# Patient Record
Sex: FEMALE | Race: BLACK OR AFRICAN AMERICAN | NOT HISPANIC OR LATINO | Employment: OTHER | URBAN - METROPOLITAN AREA
[De-identification: names, ages, dates, MRNs, and addresses within clinical notes are randomized per-mention and may not be internally consistent; named-entity substitution may affect disease eponyms.]

---

## 2017-01-08 RX ORDER — LOSARTAN POTASSIUM 100 MG/1
TABLET ORAL
Qty: 30 TABLET | Refills: 5 | Status: SHIPPED | OUTPATIENT
Start: 2017-01-08 | End: 2017-07-11

## 2017-01-16 ENCOUNTER — TELEPHONE (OUTPATIENT)
Dept: FAMILY MEDICINE | Facility: CLINIC | Age: 82
End: 2017-01-16

## 2017-01-16 RX ORDER — BENZONATATE 100 MG/1
100 CAPSULE ORAL 3 TIMES DAILY PRN
Qty: 30 CAPSULE | Refills: 0 | Status: SHIPPED | OUTPATIENT
Start: 2017-01-16 | End: 2017-01-26

## 2017-01-16 NOTE — TELEPHONE ENCOUNTER
Pt c/o coughing-productive clear. wheezing no fever  Has tried OTC cough meds but not working. Would like rx for tessalon sent to pharmacy

## 2017-04-09 ENCOUNTER — HOSPITAL ENCOUNTER (EMERGENCY)
Facility: HOSPITAL | Age: 82
Discharge: HOME | End: 2017-04-09
Attending: EMERGENCY MEDICINE
Payer: MEDICARE

## 2017-04-09 VITALS
HEART RATE: 80 BPM | DIASTOLIC BLOOD PRESSURE: 72 MMHG | HEIGHT: 60 IN | WEIGHT: 147 LBS | SYSTOLIC BLOOD PRESSURE: 170 MMHG | BODY MASS INDEX: 28.86 KG/M2 | OXYGEN SATURATION: 98 % | RESPIRATION RATE: 18 BRPM | TEMPERATURE: 98 F

## 2017-04-09 DIAGNOSIS — R52 PAIN: ICD-10-CM

## 2017-04-09 DIAGNOSIS — M10.9 ACUTE GOUT INVOLVING TOE OF RIGHT FOOT, UNSPECIFIED CAUSE: Primary | ICD-10-CM

## 2017-04-09 DIAGNOSIS — I10 ESSENTIAL HYPERTENSION: ICD-10-CM

## 2017-04-09 PROCEDURE — 99283 EMERGENCY DEPT VISIT LOW MDM: CPT

## 2017-04-09 PROCEDURE — 25000003 PHARM REV CODE 250: Performed by: EMERGENCY MEDICINE

## 2017-04-09 RX ORDER — HYDROCODONE BITARTRATE AND ACETAMINOPHEN 7.5; 325 MG/1; MG/1
1 TABLET ORAL EVERY 6 HOURS PRN
Qty: 11 TABLET | Refills: 0 | Status: SHIPPED | OUTPATIENT
Start: 2017-04-09 | End: 2017-04-26 | Stop reason: SDUPTHER

## 2017-04-09 RX ORDER — METHYLPREDNISOLONE 4 MG/1
TABLET ORAL
Qty: 1 PACKAGE | Refills: 0 | Status: SHIPPED | OUTPATIENT
Start: 2017-04-09 | End: 2017-04-10

## 2017-04-09 RX ORDER — HYDROCODONE BITARTRATE AND ACETAMINOPHEN 10; 325 MG/1; MG/1
1 TABLET ORAL
Status: COMPLETED | OUTPATIENT
Start: 2017-04-09 | End: 2017-04-09

## 2017-04-09 RX ORDER — COLCHICINE 0.6 MG/1
TABLET ORAL
Qty: 30 TABLET | Refills: 0 | Status: CANCELLED | OUTPATIENT
Start: 2017-04-09

## 2017-04-09 RX ADMIN — HYDROCODONE BITARTRATE AND ACETAMINOPHEN 1 TABLET: 10; 325 TABLET ORAL at 04:04

## 2017-04-09 NOTE — ED AVS SNAPSHOT
[facility] -   [address] [facility]  [place] [address]               Charline Messer   2017  3:39 PM   ED    Description:  Female : 1935   Department:  [facility]           Your Care was Coordinated By:     Provider Role From To    Kenisha Raymundo DO Attending Provider 17 1555 --      Reason for Visit     Foot Pain           Diagnoses this Visit        Comments    Acute gout involving toe of right foot, unspecified cause    -  Primary     Pain         Essential hypertension           ED Disposition     None           To Do List           Follow-up Information     Follow up with Za Rey MD. Schedule an appointment as soon as possible for a visit in 2 days.    Specialty:  Family Medicine    Why:  Return to the ED for:  Worsening pain, swelling, fever, or other concerns.    Contact information:    [address]  [place] [address]  [phone]         These Medications        Disp Refills Start End    methylPREDNISolone (MEDROL DOSEPACK) 4 mg tablet 1 Package 0 2017    As directed    Pharmacy: [facility] [address] [place] [address] [address] [facility] Ph #: [phone]       hydrocodone-acetaminophen 7.5-325mg (NORCO) 7.5-325 mg per tablet 11 tablet 0 2017     Take 1 tablet by mouth every 6 (six) hours as needed for Pain. - Oral    Pharmacy: [address] [facility] Ph #: [phone]         [facility]     [facility] Care [facility] -  Assistance  Unless otherwise directed by your provider, please contact [facility], our nurse care line that is available for / assistance.     Registered nurses in the [facility] provide: appointment scheduling, clinical advisement, health education, and other advisory services.  Call: [phone] (toll free)               Medications           Message regarding Medications     Verify the changes  and/or additions to your medication regime listed below are the same as discussed with your clinician today.  If any of these changes or additions are incorrect, please notify your healthcare provider.        START taking these NEW medications        Refills    methylPREDNISolone (MEDROL DOSEPACK) 4 mg tablet 0    Sig: As directed    Class: Print    hydrocodone-acetaminophen 7.5-325mg (NORCO) 7.5-325 mg per tablet 0    Sig: Take 1 tablet by mouth every 6 (six) hours as needed for Pain.    Class: Print    Route: Oral      These medications were administered today        Dose Freq    hydrocodone-acetaminophen 10-325mg per tablet 1 tablet 1 tablet ED 1 Time    Sig: Take 1 tablet by mouth ED 1 Time.    Class: Normal    Route: Oral           Verify that the below list of medications is an accurate representation of the medications you are currently taking.  If none reported, the list may be blank. If incorrect, please contact your healthcare provider. Carry this list with you in case of emergency.           Current Medications     aspirin (ECOTRIN) 81 MG EC tablet Take 1 tablet by mouth Daily.    colchicine 0.6 mg tablet Take 1 pill daily prn    fluticasone (FLONASE) 50 mcg/actuation nasal spray 2 sprays by Each Nare route daily as needed for Rhinitis.    furosemide (LASIX) 20 MG tablet Take 1 tablet by mouth Daily. prn    hydrocodone-acetaminophen 10-325mg per tablet 1 tablet Take 1 tablet by mouth ED 1 Time.    hydrocodone-acetaminophen 7.5-325mg (NORCO) 7.5-325 mg per tablet Take 1 tablet by mouth every 6 (six) hours as needed for Pain.    losartan (COZAAR) 100 MG tablet TAKE ONE TABLET BY MOUTH ONCE DAILY    methylPREDNISolone (MEDROL DOSEPACK) 4 mg tablet As directed           Clinical Reference Information           Your Vitals Were     BP Pulse Temp Resp Height Weight    142/106 91 97.5 °F (36.4 °C) 20 5' (1.524 m) 66.7 kg (147 lb)    SpO2 BMI             98% 28.71 kg/m2         Allergies as of 4/9/2017         Reactions    Codeine     Other reaction(s): Unknown    Ibuprofen     Other reaction(s): Vomiting  Other reaction(s): Itching    Zetia  [Ezetimibe]     Other reaction(s): Vomiting      Immunizations Administered on Date of Encounter - 4/9/2017     None      ED Micro, Lab, POCT     None      ED Imaging Orders     Start Ordered       Status Ordering Provider    04/09/17 1556 04/09/17 1556  X-Ray Foot Complete Right  1 time imaging      Final result       Discharge References/Attachments     GOUT ATTACKS, TREATING (ENGLISH)    METHYLPREDNISOLONE TABLETS (ENGLISH)    ACETAMINOPHEN; HYDROCODONE TABLETS OR CAPSULES (ENGLISH)      Your Scheduled Appointments     Apr 12, 2017 11:15 AM CDT   Established Patient Visit with Za Rey MD   [facility] ([facility])    [address] [place] [address] [phone]              [facility] Sign-Up     Activating your [facility] account is as easy as 1-2-3!     1) Visit [email or web address], select Sign Up Now, enter this activation code and your date of birth, then select Next.  [email or web address]  Expires: 5/24/2017  4:50 PM      2) Create a username and password to use when you visit [facility] in the future and select a security question in case you lose your password and select Next.    3) Enter your e-mail address and click Sign Up!    Additional Information  If you have questions, please e-mail [email or web address] or call [phone] to talk to our [facility] staff. Remember, [facility] is NOT to be used for urgent needs. For medical emergencies, dial 911.          [facility] complies with applicable Federal civil rights laws and does not discriminate on the basis of race, color, national origin, age, disability, or sex.        Language Assistance Services     ATTENTION: Language assistance services are available, free of charge. Please call [phone].      ATENCIÓN: susan Coy  disposición servicios gratuitos de asistencia lingüística. Llzelda al [phone].     SUDHIR Ý: N?u b?n nói Ti?ng Vi?t, có các d?ch v? h? tr? ngôn ng? mi?n phí dành cho b?n. G?i s? [phone].

## 2017-04-09 NOTE — ED PROVIDER NOTES
SCRIBE #1 NOTE: I, Hortencia Wade, am scribing for, and in the presence of, Kenisha Raymundo DO. I have scribed the entire note.      History      Chief Complaint   Patient presents with    Foot Pain     R foot/edema.  pt c/o gout also states she dropped hoe handle on it this am       Review of patient's allergies indicates:   Allergen Reactions    Codeine      Other reaction(s): Unknown    Ibuprofen      Other reaction(s): Vomiting  Other reaction(s): Itching    Zetia  [ezetimibe]      Other reaction(s): Vomiting        HPI   HPI    4/9/2017, 3:56 PM   History obtained from the patient      History of Present Illness: Charline Messer is a 82 y.o. female patient who presents to the Emergency Department for exacerbation of R toe pain. She reports that she has a PMHx of gout. Pt states that she has had toe pain since 3 days ago but pain was exacerbated 2 days ago after a shovel hit her foot. She experienced similar pain 5-6 months ago. Symptoms are constant and moderate in severity. Pain exacerbated with palpation. Associated sxs include 1 episode of N/V this morning. Patient denies fever, chills, ecchymosis, extremity weakness/numbness, paresthesias, calf pain, and all other sxs at this time. She reports that Lortab has provided some relief from pain in the past. No further complaints or concerns at this time.       Arrival mode: Personal vehicle    PCP: Za Rey MD       Past Medical History:  Past Medical History:   Diagnosis Date    Allergic rhinitis, cause unspecified     Arthritis     Asthma     as a child    Benign colonic polyp     Breast cancer mid 1980s    right; per patient s/p right mastectomy and chemo, unsure about radiation    Depression     Diverticular disease     External hemorrhoids     Gout attack     Hyperlipidemia     Hypertension     Hypothyroidism     Obesity (BMI 30-39.9) 10/24/2016    Osteoarthritis     Peripheral vascular disease     Postmenopausal     Pulmonary  embolism     reported per patient; unsure date    Thrombocytopenia     Tinnitus aurium     Vitamin D deficiency disease        Past Surgical History:  Past Surgical History:   Procedure Laterality Date    PARTIAL HYSTERECTOMY      Due to fibroids    Right mastectomy           Family History:  Family History   Problem Relation Age of Onset    Heart disease Mother     Hypertension Mother     Stroke Mother     Hypertension Father     Heart disease Sister     No Known Problems Son     Cancer Neg Hx     Diabetes Neg Hx     Kidney disease Neg Hx        Social History:  Social History     Social History Main Topics    Smoking status: Never Smoker    Smokeless tobacco: Never Used    Alcohol use 2.4 oz/week     4 Cans of beer, 0 Standard drinks or equivalent per week    Drug use: No    Sexual activity: No       ROS   Review of Systems   Constitutional: Negative for chills and fever.   HENT: Negative for sore throat.    Respiratory: Negative for shortness of breath.    Cardiovascular: Negative for chest pain.   Gastrointestinal: Positive for nausea and vomiting.   Genitourinary: Negative for dysuria.   Musculoskeletal: Positive for arthralgias (R toe). Negative for back pain.   Skin: Negative for color change (ecchymosis) and rash.   Neurological: Negative for weakness and numbness.        (-) paresthesias   Hematological: Does not bruise/bleed easily.   All other systems reviewed and are negative.      Physical Exam    Initial Vitals   BP Pulse Resp Temp SpO2   04/09/17 1453 04/09/17 1453 04/09/17 1453 04/09/17 1453 04/09/17 1453   142/106 91 20 97.5 °F (36.4 °C) 98 %      Physical Exam  Nursing Notes and Vital Signs Reviewed.  Constitutional: Patient is in no acute distress. Awake and alert. Well-developed and well-nourished.  Head: Atraumatic. Normocephalic.  Eyes: PERRL. EOM intact. Conjunctivae are not pale. No scleral icterus.  ENT: Mucous membranes are moist. Oropharynx is clear and symmetric.     Neck: Supple. Full ROM.   Cardiovascular: Regular rate. Regular rhythm. No murmurs, rubs, or gallops. Distal pulses are 2+ and symmetric.  Pulmonary/Chest: No respiratory distress. Clear to auscultation bilaterally. No wheezing, rales, or rhonchi.  Abdominal: Soft. Non distended.   Musculoskeletal: Moves all extremities. No obvious deformities.  No calf tenderness.  Right foot: No evident deformity. Warmth, swelling, and erythema to first MTP joint. No midfoot involvement. Cap refill distally is <2 seconds. DP and PT pulses are equal and 2+ bilaterally. No motor deficit. No distal sensory deficit  Skin: Warm and dry.  Neurological:  Alert, awake, and appropriate.  Normal speech.  No acute focal neurological deficits are appreciated.  Psychiatric: Normal affect. Good eye contact. Appropriate in content.    ED Course    Procedures  ED Vital Signs:  Vitals:    04/09/17 1453 04/09/17 1707   BP: (!) 142/106 (!) 170/72   Pulse: 91 80   Resp: 20 18   Temp: 97.5 °F (36.4 °C)    SpO2: 98% 98%   Weight: 66.7 kg (147 lb)    Height: 5' (1.524 m)      Imaging Results:  Imaging Results         X-Ray Foot Complete Right (Final result) Result time:  04/09/17 16:26:36    Final result by Arsenio Morrell MD (04/09/17 16:26:36)    Impression:         Unremarkable exam.      Electronically signed by: ARSENIO MORRELL MD  Date:     04/09/17  Time:    16:26     Narrative:    Exam: Right foot x-ray, 3 views.    History: Pain in right foot.    Findings: Anatomic alignment. No fracture or periosteal reaction identified.            The Emergency Provider reviewed the vital signs and test results, which are outlined above.    ED Discussion     4:38 PM: Reassessed pt at this time. Discussed pt dx and plan to tx with Lortab. Informed pt to follow up with PCP. D/w patient that she is at increased risk of falling when on Lortab. D/w patient to watch for spreading of erythema, blister formation, or development of fever and to return to ED if she  does experience these sxs. All questions and concerns were addressed at this time. Pt expresses understanding of information and instructions, and is comfortable with plan to discharge. Pt is stable for discharge.    I discussed with patient and/or family/caretaker that negative X-ray does not rule out occult fracture or other soft tissue injury.  Persistent pain greater than 7-10 days or increased pain requires follow up, specifically with orthopedics.     Driving or other activities under influence of medications - Patient and/or family/caretaker was given a prescription for, or instructed to use a medicine that may impair ability to drive, operate machinery, or participate in other potentially dangerous activities.  Patient was instructed not to participate in these activities while under the influence of these medications.    Pre-hypertension/Hypertension: The pt has been informed that they may have pre-hypertension or hypertension based on a blood pressure reading in the ED. I recommend that the pt call the PCP listed on their discharge instructions or a physician of their choice this week to arrange f/u for further evaluation of possible pre-hypertension or hypertension.     ED Medication(s):  Medications   hydrocodone-acetaminophen 10-325mg per tablet 1 tablet (1 tablet Oral Given 4/9/17 1654)       Discharge Medication List as of 4/9/2017  4:51 PM      START taking these medications    Details   hydrocodone-acetaminophen 7.5-325mg (NORCO) 7.5-325 mg per tablet Take 1 tablet by mouth every 6 (six) hours as needed for Pain., Starting 4/9/2017, Until Discontinued, Print      methylPREDNISolone (MEDROL DOSEPACK) 4 mg tablet As directed, Print             Follow-up Information     Follow up with Za Rey MD. Schedule an appointment as soon as possible for a visit in 2 days.    Specialty:  Family Medicine    Why:  Return to the ED for:  Worsening pain, swelling, fever, or other concerns.    Contact  information:    [address] [address]  [phone]             Medical Decision Making    Medical Decision Making:   Clinical Tests:   Radiological Study: Ordered and Reviewed           Scribe Attestation:   Scribe #1: I performed the above scribed service and the documentation accurately describes the services I performed. I attest to the accuracy of the note.    Attending:   Physician Attestation Statement for Scribe #1: I, Kenisha Raymundo DO, personally performed the services described in this documentation, as scribed by Hortencia Wade, in my presence, and it is both accurate and complete.          Clinical Impression       ICD-10-CM ICD-9-CM   1. Acute gout involving toe of right foot, unspecified cause M10.9 274.01   2. Pain R52 780.96   3. Essential hypertension I10 401.9       Disposition:   Disposition: Discharged  Condition: Stable         Kenisha Raymundo DO  04/09/17 2114

## 2017-04-10 ENCOUNTER — OFFICE VISIT (OUTPATIENT)
Dept: FAMILY MEDICINE | Facility: CLINIC | Age: 82
End: 2017-04-10
Payer: MEDICARE

## 2017-04-10 VITALS
HEIGHT: 60 IN | TEMPERATURE: 97 F | RESPIRATION RATE: 18 BRPM | WEIGHT: 148.56 LBS | DIASTOLIC BLOOD PRESSURE: 78 MMHG | BODY MASS INDEX: 29.17 KG/M2 | SYSTOLIC BLOOD PRESSURE: 122 MMHG | HEART RATE: 80 BPM

## 2017-04-10 DIAGNOSIS — D69.6 THROMBOCYTOPENIA: ICD-10-CM

## 2017-04-10 DIAGNOSIS — C50.911 MALIGNANT NEOPLASM OF RIGHT FEMALE BREAST, UNSPECIFIED SITE OF BREAST: ICD-10-CM

## 2017-04-10 DIAGNOSIS — M10.9 GOUT, ARTHRITIS: Primary | ICD-10-CM

## 2017-04-10 DIAGNOSIS — I73.9 PAD (PERIPHERAL ARTERY DISEASE): ICD-10-CM

## 2017-04-10 PROCEDURE — 99214 OFFICE O/P EST MOD 30 MIN: CPT | Mod: S$GLB,,, | Performed by: FAMILY MEDICINE

## 2017-04-10 PROCEDURE — 99999 PR PBB SHADOW E&M-EST. PATIENT-LVL III: CPT | Mod: PBBFAC,,, | Performed by: FAMILY MEDICINE

## 2017-04-10 PROCEDURE — 1125F AMNT PAIN NOTED PAIN PRSNT: CPT | Mod: S$GLB,,, | Performed by: FAMILY MEDICINE

## 2017-04-10 PROCEDURE — 1157F ADVNC CARE PLAN IN RCRD: CPT | Mod: S$GLB,,, | Performed by: FAMILY MEDICINE

## 2017-04-10 PROCEDURE — 99499 UNLISTED E&M SERVICE: CPT | Mod: S$GLB,,, | Performed by: FAMILY MEDICINE

## 2017-04-10 PROCEDURE — 1160F RVW MEDS BY RX/DR IN RCRD: CPT | Mod: S$GLB,,, | Performed by: FAMILY MEDICINE

## 2017-04-10 PROCEDURE — 3078F DIAST BP <80 MM HG: CPT | Mod: S$GLB,,, | Performed by: FAMILY MEDICINE

## 2017-04-10 PROCEDURE — 3074F SYST BP LT 130 MM HG: CPT | Mod: S$GLB,,, | Performed by: FAMILY MEDICINE

## 2017-04-10 PROCEDURE — 1159F MED LIST DOCD IN RCRD: CPT | Mod: S$GLB,,, | Performed by: FAMILY MEDICINE

## 2017-04-10 RX ORDER — COLCHICINE 0.6 MG/1
TABLET ORAL
Qty: 30 TABLET | Refills: 1 | Status: SHIPPED | OUTPATIENT
Start: 2017-04-10 | End: 2018-02-05 | Stop reason: SDUPTHER

## 2017-04-10 NOTE — MR AVS SNAPSHOT
[facility]  [address] [facility] [address]  Phone: [phone]                  Charline Messer   4/10/2017 1:00 PM   Office Visit    Description:  Female : 1935   Provider:  Za Rey MD   Department:  [facility]           Reason for Visit     Foot Swelling           Diagnoses this Visit        Comments    Gout, arthritis    -  Primary     PAD (peripheral artery disease)         Thrombocytopenia                To Do List           Future Appointments        Provider Department Dept Phone    2017 11:15 AM Za Rey MD [facility] [phone]      Goals (5 Years of Data)     None       These Medications        Disp Refills Start End    colchicine 0.6 mg tablet 30 tablet 1 4/10/2017     Take 1 pill daily prn    Pharmacy: [address] #: [phone]         [facility]     [facility] Call Nurse Care Line -  Assistance  Unless otherwise directed by your provider, please contact [facility], our nurse care line that is available for  assistance.     Registered nurses in the [facility] provide: appointment scheduling, clinical advisement, health education, and other advisory services.  Call: [phone] (toll free)               Medications           Message regarding Medications     Verify the changes and/or additions to your medication regime listed below are the same as discussed with your clinician today.  If any of these changes or additions are incorrect, please notify your healthcare provider.        STOP taking these medications     methylPREDNISolone (MEDROL DOSEPACK) 4 mg tablet As directed           Verify that the below list of medications is an accurate representation of the medications you are currently taking.  If none reported, the list may be blank. If incorrect, please contact your healthcare  provider. Carry this list with you in case of emergency.           Current Medications     aspirin (ECOTRIN) 81 MG EC tablet Take 1 tablet by mouth Daily.    colchicine 0.6 mg tablet Take 1 pill daily prn    fluticasone (FLONASE) 50 mcg/actuation nasal spray 2 sprays by Each Nare route daily as needed for Rhinitis.    furosemide (LASIX) 20 MG tablet Take 1 tablet by mouth Daily. prn    hydrocodone-acetaminophen 7.5-325mg (NORCO) 7.5-325 mg per tablet Take 1 tablet by mouth every 6 (six) hours as needed for Pain.    losartan (COZAAR) 100 MG tablet TAKE ONE TABLET BY MOUTH ONCE DAILY           Clinical Reference Information           Your Vitals Were     BP Pulse Temp Resp Height Weight    122/78 (BP Location: Left arm, Patient Position: Sitting, BP Method: Manual) 80 96.8 °F (36 °C) (Tympanic) 18 5' (1.524 m) 67.4 kg (148 lb 9.4 oz)    BMI                29.02 kg/m2          Blood Pressure          Most Recent Value    BP  122/78      Allergies as of 4/10/2017     Codeine    Ibuprofen    Zetia  [Ezetimibe]      Immunizations Administered on Date of Encounter - 4/10/2017     None      [facility] Sign-Up     Activating your [facility] account is as easy as 1-2-3!     1) Visit [email or web address], select Sign Up Now, enter this activation code and your date of birth, then select Next.  [email or web address]  Expires: 5/24/2017  4:50 PM      2) Create a username and password to use when you visit [facility] in the future and select a security question in case you lose your password and select Next.    3) Enter your e-mail address and click Sign Up!    Additional Information  If you have questions, please e-mail [email or web address] or call [phone] to talk to our [facility] staff. Remember, [facility] is NOT to be used for urgent needs. For medical emergencies, dial 911.         Language Assistance Services     ATTENTION: Language assistance services are available, free of charge. Please call [phone].      ATENCIÓN: Si  habla luis, tiene a franco disposición servicios gratuitos de asistencia lingüística. Llzelda al [phone].     SUDHIR Ý: N?u b?n nói Ti?ng Vi?t, có các d?ch v? h? tr? ngôn ng? mi?n phí dành cho b?n. G?i s? [phone].         [facility] complies with applicable Federal civil rights laws and does not discriminate on the basis of race, color, national origin, age, disability, or sex.

## 2017-04-10 NOTE — LETTER
April 10, 2017      [facility]  [address] [facility] [address]  Phone: [phone]       Patient: Charline Messer   YOB: 1935  Date of Visit: 04/10/2017    To Whom It May Concern:    Charline  was at [facility] on 04/6/2017. She may return to work on 04/10/2017 with no restrictions. If you have any questions or concerns, or if I can be of further assistance, please do not hesitate to contact me.    Sincerely,    Za Rey MD

## 2017-04-10 NOTE — PROGRESS NOTES
Subjective:       Patient ID: Charline Messer is a 82 y.o. female.    Chief Complaint: Foot Swelling    HPI Comments: Ms. Messer comes in today for ER follow-up for foot swelling and pain.  She was evaluated at [facility] on April 19, 2017 for acute gout involving her right foot.  She states she dropped hoe handle onto her foot yesterday morning with subsequent pain in her right foot with walking.  However, she reports having pain and swelling at her right foot since Thursday.  She reports having subjective fever, chills without chest pain, palpitations, leg swelling, shortness of breath, cough, wheezing, abdominal pain, nausea, vomiting, diarrhea, constipation.  Right foot x-ray was performed and unremarkable.  She was given prescriptions for Medrol Dosepak, Norco 7.5-325 mg every 6 hours prn pain, #11 and told to see me in 2 days for follow-up.      She reports slight soreness at her foot today at 4/10 on pain scale.  She is ambulatory.  She consumes alcohol.  She states she took colchicine on Friday but is currently out of colchicine after starting it on Thursday.  She reports swelling improved last night.        Current Outpatient Prescriptions:  aspirin (ECOTRIN) 81 MG EC tablet, Take 1 tablet by mouth Daily.  colchicine 0.6 mg tablet, Take 1 pill daily prn  fluticasone (FLONASE) 50 mcg/actuation nasal spray, 2 sprays by Each Nare route daily as needed for Rhinitis.  furosemide (LASIX) 20 MG tablet, Take 1 tablet by mouth Daily. prn  hydrocodone-acetaminophen 7.5-325mg (NORCO) 7.5-325 mg per tablet, Take 1 tablet by mouth every 6 (six) hours as needed for Pain.  losartan (COZAAR) 100 MG tablet, TAKE ONE TABLET BY MOUTH ONCE DAILY    Labs:                   WBC                      8.17                10/24/2016                 HGB                      13.2                10/24/2016                 HCT                      41.7                10/24/2016                 PLT                      137 (L)              10/24/2016              LDLCALC                  149.2               01/19/2016                           CHOL                     239 (H)             01/19/2016                 TRIG                     89                  01/19/2016                 HDL                      72                  01/19/2016                 ALT                      16                  01/19/2016                 AST                      18                  01/19/2016                 NA                       141                 01/19/2016                 K                        3.7                 01/19/2016                 CL                       104                 01/19/2016                 CREATININE               0.9                 01/19/2016                 BUN                      25 (H)              01/19/2016                 CO2                      29                  01/19/2016                 TSH                      3.784               01/19/2016                 INR                      1.0                 09/11/2014                  Review of Systems   Constitutional: Positive for chills and fever.   Respiratory: Negative for cough, shortness of breath and wheezing.    Cardiovascular: Negative for chest pain.   Gastrointestinal: Negative for abdominal pain, constipation, diarrhea, nausea and vomiting.   Musculoskeletal: Positive for joint swelling.        Positive for right foot pain.       Objective:      Physical Exam   Constitutional: She is oriented to person, place, and time. She appears well-developed and well-nourished. No distress.   Elderly and pleasant.   Eyes:   She wears glasses.   Cardiovascular: Normal rate and regular rhythm.    No murmur heard.  Chronic, slightly decreased pedal pulse at right.   Pulmonary/Chest: Effort normal and breath sounds normal. No respiratory distress. She has no wheezes.   Abdominal: Soft. Bowel sounds are normal. She exhibits no distension and no mass. There is no tenderness.  There is no rebound and no guarding.   Musculoskeletal: Normal range of motion. She exhibits edema and tenderness.   She is ambulatory without problems. Slightly tender with slight swelling at right lateral great toe with full range of motion noted.   Neurological: She is alert and oriented to person, place, and time. She has normal reflexes.   Skin: She is not diaphoretic.   Psychiatric: She has a normal mood and affect. Her behavior is normal. Judgment and thought content normal.   Vitals reviewed.      Assessment:       1. Gout, arthritis    2. PAD (peripheral artery disease)    3. Thrombocytopenia    4. Malignant neoplasm of right female breast, unspecified site of breast        Plan:       1.  No labs today.  2.  Continue current medications, follow low sodium, low cholesterol, low carb diet, daily walks.  3.  Prescription refill - Colchicine 0.6 mg daily prn, #30, 1 refill.  4.  Advised patient to avoid alcohol.  5.  Follow up sooner if no improvement or worsening symptoms noted.  6.  Keep scheduled 4/12/2017 physical w/me with fasting annual labs.  7.  Work excuse for 4/6/2017 - 4/7/2017 with return 4/10/2017.

## 2017-04-26 ENCOUNTER — HOSPITAL ENCOUNTER (EMERGENCY)
Facility: HOSPITAL | Age: 82
Discharge: HOME | End: 2017-04-26
Attending: EMERGENCY MEDICINE
Payer: MEDICARE

## 2017-04-26 VITALS
WEIGHT: 140 LBS | HEIGHT: 60 IN | DIASTOLIC BLOOD PRESSURE: 66 MMHG | HEART RATE: 98 BPM | OXYGEN SATURATION: 97 % | TEMPERATURE: 98 F | RESPIRATION RATE: 18 BRPM | SYSTOLIC BLOOD PRESSURE: 172 MMHG | BODY MASS INDEX: 27.48 KG/M2

## 2017-04-26 DIAGNOSIS — T14.8XXA CONTUSION: ICD-10-CM

## 2017-04-26 DIAGNOSIS — M10.9 GOUTY ARTHRITIS OF TOE: Primary | ICD-10-CM

## 2017-04-26 PROCEDURE — 99283 EMERGENCY DEPT VISIT LOW MDM: CPT

## 2017-04-26 RX ORDER — HYDROCODONE BITARTRATE AND ACETAMINOPHEN 5; 325 MG/1; MG/1
1 TABLET ORAL EVERY 4 HOURS PRN
Qty: 10 TABLET | Refills: 0 | Status: SHIPPED | OUTPATIENT
Start: 2017-04-26 | End: 2017-07-11 | Stop reason: SDUPTHER

## 2017-04-26 NOTE — DISCHARGE INSTRUCTIONS
Gout    Gout is an inflammation of a joint due to a build-up of gout crystals in the joint fluid. This occurs when there is an excess of uric acid (a normal waste product) in the body. Uric acid builds up in the body when the kidneys are unable to filter enough of it from the blood. This may occur with age. It is also associated with kidney disease. Gout occurs more often in persons with obesity, diabetes, hypertension, or high levels of fats in the blood. It may be run in families. Gout tends to come and go. A flare up of gout is called an attack. Drinking alcohol or eating certain foods (such as shellfish or foods with additives such as high-fructose corn syrup) may increase uric acid levels in the blood and cause a gout attack.  During a gout attack, the affected joint may become a hot, red, swollen and painful. If you have had one attack of gout, you are likely to have another. An attack of gout can be treated with medicine. If these attacks become frequent, a daily medicine may be prescribed to help the kidneys remove uric acid from the body.  Home care  During a gout attack:  · Rest painful joints. If gout affects the joints of your foot or leg, you may want to use crutches for the first few days to keep from bearing weight on the affected joint.  · When sitting or lying down, raise the painful joint to a level higher than your heart.  · Apply an ice pack (ice cubes in a plastic bag wrapped in a thin towel) over the injured area for 20 minutes every 1-2 hours the first day for pain relief. Continue this 3-4 times a day for swelling and pain.  · Avoid alcohol and foods listed below (see Preventing attacks) during a gout attack. Drink extra fluid to help flush the uric acid through your kidneys.  · If you were prescribed a medication to treat gout, take it as your healthcare provider has instructed. Don't skip doses.  · Take anti-inflammatory medicine as directed.   · If pain medicines have been prescribed,  take them exactly as directed.    Preventing attacks  · Minimize or avoid alcohol use. Excess alcohol intake can cause a gout attack.  · Limit these foods and beverages:  ¨ Organ meats, such as kidneys and liver  ¨ Certain seafoods (anchovies, sardines, shrimp, scallops, herring, mackerel)  ¨ Wild game, meat extracts and meat gravies  ¨ Foods and beverages sweetened with high-fructose corn syrup, such as sodas  · Eat a healthy diet including low-fat and nonfat dairy, whole grains, and vegetables.  · If you are overweight, talk to your healthcare provider about a weight reduction plan. Avoid fasting or extreme low calorie diets (less than 900 calories per day). This will increase uric acid levels in the body.  · If you have diabetes or high blood pressure, work with your doctor to manage these conditions.  · Protect the joint from injury. Trauma can trigger a gout attack.  Follow-up care  Follow up with your healthcare provider or as advised.   When to seek medical advice  Call your healthcare provider if you have any of the following:  · Fever over 100.4°F (38.ºC) with worsening joint pain  · Increasing redness around the joint  · Pain developing in another joint  · Repeated vomiting, abdominal pain, or blood in the vomit or stool (black or red color)  Date Last Reviewed: 5/14/2015  © 4945-0762 [employer]. [address] [place] [address], [place] [address]. All rights reserved. This information is not intended as a substitute for professional medical care. Always follow your healthcare professional's instructions.

## 2017-04-26 NOTE — ED AVS SNAPSHOT
[facility] -   [address] [place] [id number]               Charline Messer   2017  6:52 AM   ED    Description:  Female : 1935   Department:  [facility]           Your Care was Coordinated By:     Provider Role From To    Vijay Marinelli MD Attending Provider 17 0654 --      Reason for Visit     Foot Pain           Diagnoses this Visit        Comments    Gouty arthritis of toe    -  Primary     Contusion           ED Disposition     None           To Do List           Follow-up Information     Follow up with Za Rey MD. Call in 1 day.    Specialty:  Family Medicine    Contact information:    [address]  [facility] [address]  [phone]          Follow up with [facility].    Specialty:  Emergency Medicine    Why:  If symptoms worsen    Contact information:    [address] [place] [id number] [phone]       These Medications        Disp Refills Start End    hydrocodone-acetaminophen 5-325mg (NORCO) 5-325 mg per tablet 10 tablet 0 2017     Take 1 tablet by mouth every 4 (four) hours as needed. - Oral    Pharmacy: [facility] [address] [place] [address] [address] [place] [address] #: [phone]         [facility]     [facility] Care Line - 24/7 Assistance  Unless otherwise directed by your provider, please contact [facility], our nurse care line that is available for 24/7 assistance.     Registered nurses in the [facility] provide: appointment scheduling, clinical advisement, health education, and other advisory services.  Call: [phone] (toll free)               Medications           Message regarding Medications     Verify the changes and/or additions to your medication regime listed below are the same as discussed with your clinician today.  If any of these changes or additions are incorrect, please notify your  healthcare provider.        START taking these NEW medications        Refills    hydrocodone-acetaminophen 5-325mg (NORCO) 5-325 mg per tablet 0    Sig: Take 1 tablet by mouth every 4 (four) hours as needed.    Class: Print    Route: Oral      STOP taking these medications     hydrocodone-acetaminophen 7.5-325mg (NORCO) 7.5-325 mg per tablet Take 1 tablet by mouth every 6 (six) hours as needed for Pain.           Verify that the below list of medications is an accurate representation of the medications you are currently taking.  If none reported, the list may be blank. If incorrect, please contact your healthcare provider. Carry this list with you in case of emergency.           Current Medications     aspirin (ECOTRIN) 81 MG EC tablet Take 1 tablet by mouth Daily.    colchicine 0.6 mg tablet Take 1 pill daily prn    fluticasone (FLONASE) 50 mcg/actuation nasal spray 2 sprays by Each Nare route daily as needed for Rhinitis.    furosemide (LASIX) 20 MG tablet Take 1 tablet by mouth Daily. prn    losartan (COZAAR) 100 MG tablet TAKE ONE TABLET BY MOUTH ONCE DAILY    hydrocodone-acetaminophen 5-325mg (NORCO) 5-325 mg per tablet Take 1 tablet by mouth every 4 (four) hours as needed.    lidocaine-EPINEPHrine 1%-1:100,000 injection 5 mL Inject 5 mLs into the skin one time.           Clinical Reference Information           Your Vitals Were     BP Pulse Temp Resp Height Weight    172/66 (BP Location: Right arm, Patient Position: Sitting) 98 97.5 °F (36.4 °C) (Oral) 18 5' (1.524 m) 63.5 kg (140 lb)    SpO2 BMI             97% 27.34 kg/m2         Allergies as of 4/26/2017        Reactions    Codeine     Other reaction(s): Unknown    Ibuprofen     Other reaction(s): Vomiting  Other reaction(s): Itching    Zetia  [Ezetimibe]     Other reaction(s): Vomiting      Immunizations Administered on Date of Encounter - 4/26/2017     None      ED Micro, Lab, POCT     None      ED Imaging Orders     Start Ordered       Status Ordering  Provider    04/26/17 0705 04/26/17 0705  X-Ray Foot Complete Right  1 time imaging      Final result         Discharge Instructions         Gout    Gout is an inflammation of a joint due to a build-up of gout crystals in the joint fluid. This occurs when there is an excess of uric acid (a normal waste product) in the body. Uric acid builds up in the body when the kidneys are unable to filter enough of it from the blood. This may occur with age. It is also associated with kidney disease. Gout occurs more often in persons with obesity, diabetes, hypertension, or high levels of fats in the blood. It may be run in families. Gout tends to come and go. A flare up of gout is called an attack. Drinking alcohol or eating certain foods (such as shellfish or foods with additives such as high-fructose corn syrup) may increase uric acid levels in the blood and cause a gout attack.  During a gout attack, the affected joint may become a hot, red, swollen and painful. If you have had one attack of gout, you are likely to have another. An attack of gout can be treated with medicine. If these attacks become frequent, a daily medicine may be prescribed to help the kidneys remove uric acid from the body.  Home care  During a gout attack:  · Rest painful joints. If gout affects the joints of your foot or leg, you may want to use crutches for the first few days to keep from bearing weight on the affected joint.  · When sitting or lying down, raise the painful joint to a level higher than your heart.  · Apply an ice pack (ice cubes in a plastic bag wrapped in a thin towel) over the injured area for 20 minutes every 1-2 hours the first day for pain relief. Continue this 3-4 times a day for swelling and pain.  · Avoid alcohol and foods listed below (see Preventing attacks) during a gout attack. Drink extra fluid to help flush the uric acid through your kidneys.  · If you were prescribed a medication to treat gout, take it as your healthcare  provider has instructed. Don't skip doses.  · Take anti-inflammatory medicine as directed.   · If pain medicines have been prescribed, take them exactly as directed.    Preventing attacks  · Minimize or avoid alcohol use. Excess alcohol intake can cause a gout attack.  · Limit these foods and beverages:  ¨ Organ meats, such as kidneys and liver  ¨ Certain seafoods (anchovies, sardines, shrimp, scallops, herring, mackerel)  ¨ Wild game, meat extracts and meat gravies  ¨ Foods and beverages sweetened with high-fructose corn syrup, such as sodas  · Eat a healthy diet including low-fat and nonfat dairy, whole grains, and vegetables.  · If you are overweight, talk to your healthcare provider about a weight reduction plan. Avoid fasting or extreme low calorie diets (less than 900 calories per day). This will increase uric acid levels in the body.  · If you have diabetes or high blood pressure, work with your doctor to manage these conditions.  · Protect the joint from injury. Trauma can trigger a gout attack.  Follow-up care  Follow up with your healthcare provider or as advised.   When to seek medical advice  Call your healthcare provider if you have any of the following:  · Fever over 100.4°F (38.ºC) with worsening joint pain  · Increasing redness around the joint  · Pain developing in another joint  · Repeated vomiting, abdominal pain, or blood in the vomit or stool (black or red color)  Date Last Reviewed: 5/14/2015  © 7585-3318 [employer]. [address] [place] [address]. All rights reserved. This information is not intended as a substitute for professional medical care. Always follow your healthcare professional's instructions.          Your Scheduled Appointments     May 31, 2017 11:30 AM CDT   Established Patient Visit with Za Rey MD   [facility]    [address] [facility] [address] [phone]               [facility] Sign-Up     Activating your [facility] account is as easy as 1-2-3!     1) Visit [email or web address], select Sign Up Now, enter this activation code and your date of birth, then select Next.  [email or web address]  Expires: 5/24/2017  4:50 PM      2) Create a username and password to use when you visit [facility] in the future and select a security question in case you lose your password and select Next.    3) Enter your e-mail address and click Sign Up!    Additional Information  If you have questions, please e-mail [email or web address] or call [phone] to talk to our [facility] staff. Remember, [facility] is NOT to be used for urgent needs. For medical emergencies, dial 911.          [facility]  complies with applicable Federal civil rights laws and does not discriminate on the basis of race, color, national origin, age, disability, or sex.        Language Assistance Services     ATTENTION: Language assistance services are available, free of charge. Please call [phone].      ATENCIÓN: Si habla español, tiene a franco disposición servicios gratuitos de asistencia lingüística. Llame al [phone].     CHÚ Ý: N?u b?n nói Ti?ng Vi?t, có các d?ch v? h? tr? ngôn ng? mi?n phí dành cho b?n. G?i s? [phone].

## 2017-04-26 NOTE — ED PROVIDER NOTES
SCRIBE #1 NOTE: I, Randy Ortiz, am scribing for, and in the presence of, Vijay Marinelli MD. I have scribed the entire note.      History      Chief Complaint   Patient presents with    Foot Pain     Right foot - dropped a garden hoe on foot 4/14 and has had pain and swelling in foot since then.  States she has a history of gout in the same foot.       Review of patient's allergies indicates:   Allergen Reactions    Codeine      Other reaction(s): Unknown    Ibuprofen      Other reaction(s): Vomiting  Other reaction(s): Itching    Zetia  [ezetimibe]      Other reaction(s): Vomiting        HPI   HPI    4/26/2017, 7:03 AM   History obtained from the patient      History of Present Illness: Charline Messer is a 82 y.o. female patient with Hx of gout presents to the Emergency Department for R foot pain which onset gradually week and a half ago. Symptoms are constant and moderate in severity. Sx are exacerbated by nothing and relieved by nothing. Pt reports most pain to her R 1st metatarsal with mild swelling. No other sxs reported. Pt states she also dropped a garden how on her foot on 4/14/17. Patient denies any fever, N/V/D, chills, weakness/numbness, gait problem, decreased sensation, decreased ROM, radiating pain, ecchymosis, erythema and all other sxs at this time. No further complaints or concerns at this time.     Arrival mode: Personal vehicle      PCP: Za Rey MD       Past Medical History:  Past Medical History:   Diagnosis Date    Allergic rhinitis, cause unspecified     Arthritis     Asthma     as a child    Benign colonic polyp     Breast cancer mid 1980s    right; per patient s/p right mastectomy and chemo, unsure about radiation    Depression     Diverticular disease     External hemorrhoids     Gout attack     Hyperlipidemia     Hypertension     Hypothyroidism     Obesity (BMI 30-39.9) 10/24/2016    Osteoarthritis     Peripheral vascular disease     Postmenopausal      Pulmonary embolism     reported per patient; unsure date    Thrombocytopenia     Tinnitus aurium     Vitamin D deficiency disease        Past Surgical History:  Past Surgical History:   Procedure Laterality Date    PARTIAL HYSTERECTOMY      Due to fibroids    Right mastectomy           Family History:  Family History   Problem Relation Age of Onset    Heart disease Mother     Hypertension Mother     Stroke Mother     Hypertension Father     Heart disease Sister     No Known Problems Son     Cancer Neg Hx     Diabetes Neg Hx     Kidney disease Neg Hx        Social History:  Social History     Social History Main Topics    Smoking status: Never Smoker    Smokeless tobacco: Never Used    Alcohol use 2.4 oz/week     4 Cans of beer, 0 Standard drinks or equivalent per week    Drug use: No    Sexual activity: No       ROS   Review of Systems   Constitutional: Negative for chills and fever.   HENT: Negative for congestion and sore throat.    Respiratory: Negative for chest tightness and shortness of breath.    Cardiovascular: Negative for chest pain.   Gastrointestinal: Negative for abdominal pain, nausea and vomiting.   Genitourinary: Negative for difficulty urinating and dysuria.   Musculoskeletal: Positive for arthralgias (R Foot) and joint swelling (R Foot). Negative for back pain and neck pain.   Skin: Negative for rash.   Neurological: Negative for dizziness, weakness, light-headedness, numbness and headaches.   Psychiatric/Behavioral: Negative for agitation and confusion.   All other systems reviewed and are negative.      Physical Exam    Initial Vitals   BP Pulse Resp Temp SpO2   04/26/17 0640 04/26/17 0640 04/26/17 0640 04/26/17 0640 04/26/17 0640   172/66 98 18 97.5 °F (36.4 °C) 97 %      Physical Exam  Nursing Notes and Vital Signs Reviewed.  Constitutional: Patient is in no apparent distress. Awake and alert. Well-developed and well-nourished.  Head: Atraumatic. Normocephalic.  Eyes: PERRL.  EOM intact. Conjunctivae are not pale. No scleral icterus.  ENT: Mucous membranes are moist. Oropharynx is clear and symmetric.    Neck: Supple. Full ROM. No lymphadenopathy.  Cardiovascular: Regular rate. Regular rhythm. No murmurs, rubs, or gallops. Distal pulses are 2+ and symmetric.  Pulmonary/Chest: No respiratory distress. Clear to auscultation bilaterally. No wheezing, rales, or rhonchi.  Abdominal: Soft and non-distended.  There is no tenderness.  No rebound, guarding, or rigidity. Good bowel sounds.  Musculoskeletal: Moves all extremities. No obvious deformities. No edema. No calf tenderness.  RLE: no evident deformity. Mild TTP and swelling to the L great toe. ROM is normal. Cap refill distally is <2 seconds. DP and PT pulses are equal and 2+ bilaterally. No motor deficit. No distal sensory deficit.  Skin: Warm and dry.  Neurological:  Alert, awake, and appropriate.  Normal speech.  No acute focal neurological deficits are appreciated.  Psychiatric: Normal affect. Good eye contact. Appropriate in content.    ED Course    Procedures  ED Vital Signs:  Vitals:    04/26/17 0640   BP: (!) 172/66   Pulse: 98   Resp: 18   Temp: 97.5 °F (36.4 °C)   TempSrc: Oral   SpO2: 97%   Weight: 63.5 kg (140 lb)   Height: 5' (1.524 m)       Abnormal Lab Results:  Labs Reviewed - No data to display       Imaging Results:  Imaging Results         X-Ray Foot Complete Right (Final result) Result time:  04/26/17 08:10:08    Final result by Dotty Sharp MD (04/26/17 08:10:08)    Impression:         No acute findings.  Degenerative changes.        Electronically signed by: DOTTY SHARP MD  Date:     04/26/17  Time:    08:10     Narrative:    Exam: XR FOOT COMPLETE 3 VIEW RIGHT    Clinical History:    Acute right foot pain. Initial encounter.    Findings:      No acute osseous, articular, or soft tissue abnormality demonstrated.Osteopenia.  Mild mid foot and interphalangeal DJD.  Right first MTP periarticular soft tissue swelling.   Vascular calcifications.                      The Emergency Provider reviewed the vital signs and test results, which are outlined above.    ED Discussion     8:11 AM: Reassessed pt at this time. Pt is laying comfortably in ED bed and in NAD. Pt is awake, alert, and oriented. Discussed with pt all pertinent ED information and results. Discussed pt dx and plan of tx. Gave pt all f/u and return to the ED instructions. All questions and concerns were addressed at this time. Pt expresses understanding of information and instructions, and is comfortable with plan to discharge. Pt is stable for discharge.            ED Medication(s):  Medications - No data to display    New Prescriptions    No medications on file       Follow-up Information     Follow up with Za Rey MD. Call in 1 day.    Specialty:  Family Medicine    Contact information:    [address] [facility] [address] [phone]          Follow up with [facility] - .    Specialty:  Emergency Medicine    Why:  If symptoms worsen    Contact information:    [address] [facility] [address] [phone]            Medical Decision Making    Medical Decision Making:   Clinical Tests:   Radiological Study: Ordered and Reviewed           Scribe Attestation:   Scribe #1: I performed the above scribed service and the documentation accurately describes the services I performed. I attest to the accuracy of the note.    Attending:   Physician Attestation Statement for Scribe #1: I, Vijay Marinelli MD, personally performed the services described in this documentation, as scribed by Randy Ortiz, in my presence, and it is both accurate and complete.          Clinical Impression       ICD-10-CM ICD-9-CM   1. Gouty arthritis of toe M10.9 274.00   2. Contusion T14.8 924.9       Disposition:   Disposition: Discharged  Condition: Stable         Vijay Marinelli MD  05/01/17 0437

## 2017-06-27 ENCOUNTER — HOSPITAL ENCOUNTER (INPATIENT)
Facility: HOSPITAL | Age: 82
LOS: 2 days | Discharge: HOME | DRG: 282 | End: 2017-06-29
Attending: EMERGENCY MEDICINE | Admitting: INTERNAL MEDICINE
Payer: MEDICARE

## 2017-06-27 DIAGNOSIS — I25.9 CHRONIC ISCHEMIC HEART DISEASE: ICD-10-CM

## 2017-06-27 DIAGNOSIS — I21.09: Primary | ICD-10-CM

## 2017-06-27 DIAGNOSIS — I10 ESSENTIAL (PRIMARY) HYPERTENSION: ICD-10-CM

## 2017-06-27 DIAGNOSIS — I21.3 ST ELEVATION MYOCARDIAL INFARCTION (STEMI), UNSPECIFIED ARTERY: ICD-10-CM

## 2017-06-27 DIAGNOSIS — I21.09 ST ELEVATION MYOCARDIAL INFARCTION (STEMI) OF ANTERIOR WALL: ICD-10-CM

## 2017-06-27 LAB
ABO + RH BLD: NORMAL
ALBUMIN SERPL BCP-MCNC: 3.7 G/DL
ALP SERPL-CCNC: 124 U/L
ALT SERPL W/O P-5'-P-CCNC: 20 U/L
AMPHET+METHAMPHET UR QL: NEGATIVE
ANION GAP SERPL CALC-SCNC: 13 MMOL/L
APTT BLDCRRT: 21 SEC
AST SERPL-CCNC: 24 U/L
BACTERIA #/AREA URNS HPF: NORMAL /HPF
BARBITURATES UR QL SCN>200 NG/ML: NEGATIVE
BASOPHILS # BLD AUTO: 0.02 K/UL
BASOPHILS NFR BLD: 0.2 %
BENZODIAZ UR QL SCN>200 NG/ML: NEGATIVE
BILIRUB SERPL-MCNC: 0.6 MG/DL
BILIRUB UR QL STRIP: NEGATIVE
BLD GP AB SCN CELLS X3 SERPL QL: NORMAL
BNP SERPL-MCNC: 26 PG/ML
BUN SERPL-MCNC: 19 MG/DL
BZE UR QL SCN: NEGATIVE
CALCIUM SERPL-MCNC: 9 MG/DL
CANNABINOIDS UR QL SCN: NEGATIVE
CHLORIDE SERPL-SCNC: 103 MMOL/L
CHOLEST/HDLC SERPL: 3.3 {RATIO}
CLARITY UR: CLEAR
CO2 SERPL-SCNC: 24 MMOL/L
COLOR UR: YELLOW
CORONARY STENOSIS: ABNORMAL
CREAT SERPL-MCNC: 1.2 MG/DL
CREAT UR-MCNC: 81.2 MG/DL
DIFFERENTIAL METHOD: ABNORMAL
EOSINOPHIL # BLD AUTO: 0.6 K/UL
EOSINOPHIL NFR BLD: 7.3 %
ERYTHROCYTE [DISTWIDTH] IN BLOOD BY AUTOMATED COUNT: 15 %
EST. GFR  (AFRICAN AMERICAN): 49 ML/MIN/1.73 M^2
EST. GFR  (NON AFRICAN AMERICAN): 43 ML/MIN/1.73 M^2
ESTIMATED PA SYSTOLIC PRESSURE: 29.83
GLUCOSE SERPL-MCNC: 157 MG/DL
GLUCOSE UR QL STRIP: NEGATIVE
HCT VFR BLD AUTO: 41.7 %
HDL/CHOLESTEROL RATIO: 30.4 %
HDLC SERPL-MCNC: 217 MG/DL
HDLC SERPL-MCNC: 66 MG/DL
HGB BLD-MCNC: 13.4 G/DL
HGB UR QL STRIP: ABNORMAL
HYALINE CASTS #/AREA URNS LPF: 0 /LPF
INR PPP: 1
KETONES UR QL STRIP: NEGATIVE
LDLC SERPL CALC-MCNC: 135.8 MG/DL
LEUKOCYTE ESTERASE UR QL STRIP: NEGATIVE
LYMPHOCYTES # BLD AUTO: 3.1 K/UL
LYMPHOCYTES NFR BLD: 36.5 %
MCH RBC QN AUTO: 28.9 PG
MCHC RBC AUTO-ENTMCNC: 32.1 %
MCV RBC AUTO: 90 FL
METHADONE UR QL SCN>300 NG/ML: NEGATIVE
MICROSCOPIC COMMENT: NORMAL
MITRAL VALVE MOBILITY: NORMAL
MONOCYTES # BLD AUTO: 0.4 K/UL
MONOCYTES NFR BLD: 4.7 %
NEUTROPHILS # BLD AUTO: 4.4 K/UL
NEUTROPHILS NFR BLD: 51.9 %
NITRITE UR QL STRIP: NEGATIVE
NONHDLC SERPL-MCNC: 151 MG/DL
OPIATES UR QL SCN: NORMAL
PCP UR QL SCN>25 NG/ML: NEGATIVE
PH UR STRIP: 6 [PH] (ref 5–8)
PLATELET # BLD AUTO: 104 K/UL
PMV BLD AUTO: ABNORMAL FL
POC ACTIVATED CLOTTING TIME K: 136 SEC (ref 74–137)
POC ACTIVATED CLOTTING TIME K: 180 SEC (ref 74–137)
POC ACTIVATED CLOTTING TIME K: 191 SEC (ref 74–137)
POTASSIUM SERPL-SCNC: 3.4 MMOL/L
PROT SERPL-MCNC: 7.6 G/DL
PROT UR QL STRIP: ABNORMAL
PROTHROMBIN TIME: 10.7 SEC
RBC # BLD AUTO: 4.63 M/UL
RBC #/AREA URNS HPF: 2 /HPF (ref 0–4)
RETIRED EF AND QEF - SEE NOTES: 50 (ref 55–65)
SAMPLE: ABNORMAL
SAMPLE: ABNORMAL
SAMPLE: NORMAL
SODIUM SERPL-SCNC: 140 MMOL/L
SP GR UR STRIP: 1.02 (ref 1–1.03)
TOXICOLOGY INFORMATION: NORMAL
TRICUSPID VALVE REGURGITATION: NORMAL
TRIGL SERPL-MCNC: 76 MG/DL
TROPONIN I SERPL DL<=0.01 NG/ML-MCNC: 0.04 NG/ML
TROPONIN I SERPL DL<=0.01 NG/ML-MCNC: 2.43 NG/ML
TROPONIN I SERPL DL<=0.01 NG/ML-MCNC: 2.53 NG/ML
URN SPEC COLLECT METH UR: ABNORMAL
UROBILINOGEN UR STRIP-ACNC: NEGATIVE EU/DL
WBC # BLD AUTO: 8.49 K/UL
WBC #/AREA URNS HPF: 2 /HPF (ref 0–5)

## 2017-06-27 PROCEDURE — 63600175 PHARM REV CODE 636 W HCPCS: Performed by: NURSE PRACTITIONER

## 2017-06-27 PROCEDURE — 81000 URINALYSIS NONAUTO W/SCOPE: CPT

## 2017-06-27 PROCEDURE — 85730 THROMBOPLASTIN TIME PARTIAL: CPT

## 2017-06-27 PROCEDURE — 25000003 PHARM REV CODE 250: Performed by: NURSE PRACTITIONER

## 2017-06-27 PROCEDURE — 80307 DRUG TEST PRSMV CHEM ANLYZR: CPT

## 2017-06-27 PROCEDURE — 25000003 PHARM REV CODE 250

## 2017-06-27 PROCEDURE — 99285 EMERGENCY DEPT VISIT HI MDM: CPT | Mod: 25

## 2017-06-27 PROCEDURE — 99152 MOD SED SAME PHYS/QHP 5/>YRS: CPT | Mod: ,,, | Performed by: INTERNAL MEDICINE

## 2017-06-27 PROCEDURE — 25500020 PHARM REV CODE 255

## 2017-06-27 PROCEDURE — C1887 CATHETER, GUIDING: HCPCS

## 2017-06-27 PROCEDURE — 63600175 PHARM REV CODE 636 W HCPCS

## 2017-06-27 PROCEDURE — 80061 LIPID PANEL: CPT

## 2017-06-27 PROCEDURE — 84484 ASSAY OF TROPONIN QUANT: CPT

## 2017-06-27 PROCEDURE — 93005 ELECTROCARDIOGRAM TRACING: CPT

## 2017-06-27 PROCEDURE — 86901 BLOOD TYPING SEROLOGIC RH(D): CPT

## 2017-06-27 PROCEDURE — 96365 THER/PROPH/DIAG IV INF INIT: CPT

## 2017-06-27 PROCEDURE — 96375 TX/PRO/DX INJ NEW DRUG ADDON: CPT

## 2017-06-27 PROCEDURE — 4A023N7 MEASUREMENT OF CARDIAC SAMPLING AND PRESSURE, LEFT HEART, PERCUTANEOUS APPROACH: ICD-10-PCS | Performed by: INTERNAL MEDICINE

## 2017-06-27 PROCEDURE — 93307 TTE W/O DOPPLER COMPLETE: CPT

## 2017-06-27 PROCEDURE — 84484 ASSAY OF TROPONIN QUANT: CPT | Mod: 91

## 2017-06-27 PROCEDURE — 36415 COLL VENOUS BLD VENIPUNCTURE: CPT

## 2017-06-27 PROCEDURE — 25000003 PHARM REV CODE 250: Performed by: EMERGENCY MEDICINE

## 2017-06-27 PROCEDURE — 25000003 PHARM REV CODE 250: Performed by: INTERNAL MEDICINE

## 2017-06-27 PROCEDURE — 85025 COMPLETE CBC W/AUTO DIFF WBC: CPT

## 2017-06-27 PROCEDURE — 99223 1ST HOSP IP/OBS HIGH 75: CPT | Mod: ,,, | Performed by: INTERNAL MEDICINE

## 2017-06-27 PROCEDURE — 83880 ASSAY OF NATRIURETIC PEPTIDE: CPT

## 2017-06-27 PROCEDURE — 93458 L HRT ARTERY/VENTRICLE ANGIO: CPT | Mod: 26,,, | Performed by: INTERNAL MEDICINE

## 2017-06-27 PROCEDURE — 80053 COMPREHEN METABOLIC PANEL: CPT

## 2017-06-27 PROCEDURE — 86900 BLOOD TYPING SEROLOGIC ABO: CPT

## 2017-06-27 PROCEDURE — 93010 ELECTROCARDIOGRAM REPORT: CPT | Mod: S$GLB,,, | Performed by: INTERNAL MEDICINE

## 2017-06-27 PROCEDURE — 93307 TTE W/O DOPPLER COMPLETE: CPT | Mod: 26,,, | Performed by: INTERNAL MEDICINE

## 2017-06-27 PROCEDURE — 85610 PROTHROMBIN TIME: CPT

## 2017-06-27 PROCEDURE — 20000000 HC ICU ROOM

## 2017-06-27 RX ORDER — METOPROLOL TARTRATE 1 MG/ML
5 INJECTION, SOLUTION INTRAVENOUS
Status: COMPLETED | OUTPATIENT
Start: 2017-06-27 | End: 2017-06-27

## 2017-06-27 RX ORDER — SODIUM CHLORIDE 9 MG/ML
INJECTION, SOLUTION INTRAVENOUS CONTINUOUS
Status: DISCONTINUED | OUTPATIENT
Start: 2017-06-27 | End: 2017-06-28

## 2017-06-27 RX ORDER — RANOLAZINE 500 MG/1
500 TABLET, EXTENDED RELEASE ORAL 2 TIMES DAILY
Status: DISCONTINUED | OUTPATIENT
Start: 2017-06-27 | End: 2017-06-29 | Stop reason: HOSPADM

## 2017-06-27 RX ORDER — SODIUM CHLORIDE 9 MG/ML
3 INJECTION, SOLUTION INTRAMUSCULAR; INTRAVENOUS; SUBCUTANEOUS EVERY 8 HOURS
Status: DISCONTINUED | OUTPATIENT
Start: 2017-06-27 | End: 2017-06-29 | Stop reason: HOSPADM

## 2017-06-27 RX ORDER — CLOPIDOGREL BISULFATE 75 MG/1
75 TABLET ORAL DAILY
Status: DISCONTINUED | OUTPATIENT
Start: 2017-06-28 | End: 2017-06-29 | Stop reason: HOSPADM

## 2017-06-27 RX ORDER — FAMOTIDINE 20 MG/1
20 TABLET, FILM COATED ORAL DAILY
Status: DISCONTINUED | OUTPATIENT
Start: 2017-06-27 | End: 2017-06-29 | Stop reason: HOSPADM

## 2017-06-27 RX ORDER — ASPIRIN 81 MG/1
81 TABLET ORAL DAILY
Status: DISCONTINUED | OUTPATIENT
Start: 2017-06-28 | End: 2017-06-29 | Stop reason: HOSPADM

## 2017-06-27 RX ORDER — ENOXAPARIN SODIUM 100 MG/ML
40 INJECTION SUBCUTANEOUS EVERY 24 HOURS
Status: DISCONTINUED | OUTPATIENT
Start: 2017-06-27 | End: 2017-06-29 | Stop reason: HOSPADM

## 2017-06-27 RX ORDER — HYDRALAZINE HYDROCHLORIDE 20 MG/ML
10 INJECTION INTRAMUSCULAR; INTRAVENOUS EVERY 4 HOURS PRN
Status: DISCONTINUED | OUTPATIENT
Start: 2017-06-27 | End: 2017-06-29 | Stop reason: HOSPADM

## 2017-06-27 RX ORDER — HYDROCODONE BITARTRATE AND ACETAMINOPHEN 5; 325 MG/1; MG/1
1 TABLET ORAL EVERY 4 HOURS PRN
Status: DISCONTINUED | OUTPATIENT
Start: 2017-06-27 | End: 2017-06-29 | Stop reason: HOSPADM

## 2017-06-27 RX ORDER — MORPHINE SULFATE 2 MG/ML
2 INJECTION, SOLUTION INTRAMUSCULAR; INTRAVENOUS EVERY 4 HOURS PRN
Status: DISCONTINUED | OUTPATIENT
Start: 2017-06-27 | End: 2017-06-28

## 2017-06-27 RX ORDER — LOSARTAN POTASSIUM 50 MG/1
100 TABLET ORAL DAILY
Status: DISCONTINUED | OUTPATIENT
Start: 2017-06-27 | End: 2017-06-29 | Stop reason: HOSPADM

## 2017-06-27 RX ORDER — ATORVASTATIN CALCIUM 40 MG/1
80 TABLET, FILM COATED ORAL DAILY
Status: DISCONTINUED | OUTPATIENT
Start: 2017-06-27 | End: 2017-06-29 | Stop reason: HOSPADM

## 2017-06-27 RX ORDER — DIPHENHYDRAMINE HYDROCHLORIDE 50 MG/ML
25 INJECTION INTRAMUSCULAR; INTRAVENOUS EVERY 6 HOURS PRN
Status: DISCONTINUED | OUTPATIENT
Start: 2017-06-27 | End: 2017-06-29 | Stop reason: HOSPADM

## 2017-06-27 RX ORDER — DIPHENHYDRAMINE HYDROCHLORIDE 50 MG/ML
12.5 INJECTION INTRAMUSCULAR; INTRAVENOUS ONCE
Status: COMPLETED | OUTPATIENT
Start: 2017-06-27 | End: 2017-06-27

## 2017-06-27 RX ORDER — METOPROLOL TARTRATE 25 MG/1
25 TABLET, FILM COATED ORAL 3 TIMES DAILY
Status: DISCONTINUED | OUTPATIENT
Start: 2017-06-27 | End: 2017-06-29 | Stop reason: HOSPADM

## 2017-06-27 RX ORDER — POTASSIUM CHLORIDE 20 MEQ/1
40 TABLET, EXTENDED RELEASE ORAL ONCE
Status: COMPLETED | OUTPATIENT
Start: 2017-06-27 | End: 2017-06-27

## 2017-06-27 RX ORDER — DEXTROSE MONOHYDRATE AND SODIUM CHLORIDE 5; .45 G/100ML; G/100ML
INJECTION, SOLUTION INTRAVENOUS CONTINUOUS
Status: DISCONTINUED | OUTPATIENT
Start: 2017-06-27 | End: 2017-06-27

## 2017-06-27 RX ORDER — HEPARIN SODIUM,PORCINE/D5W 25000/250
16 INTRAVENOUS SOLUTION INTRAVENOUS CONTINUOUS
Status: DISCONTINUED | OUTPATIENT
Start: 2017-06-27 | End: 2017-06-27

## 2017-06-27 RX ORDER — ONDANSETRON 8 MG/1
8 TABLET, ORALLY DISINTEGRATING ORAL EVERY 8 HOURS PRN
Status: DISCONTINUED | OUTPATIENT
Start: 2017-06-27 | End: 2017-06-29 | Stop reason: HOSPADM

## 2017-06-27 RX ORDER — CLOPIDOGREL 300 MG/1
600 TABLET, FILM COATED ORAL
Status: COMPLETED | OUTPATIENT
Start: 2017-06-27 | End: 2017-06-27

## 2017-06-27 RX ADMIN — NITROGLYCERIN 1 INCH: 20 OINTMENT TOPICAL at 11:06

## 2017-06-27 RX ADMIN — METOPROLOL TARTRATE 25 MG: 25 TABLET ORAL at 09:06

## 2017-06-27 RX ADMIN — SODIUM CHLORIDE, PRESERVATIVE FREE 3 ML: 5 INJECTION INTRAVENOUS at 09:06

## 2017-06-27 RX ADMIN — METOPROLOL TARTRATE 5 MG: 1 INJECTION, SOLUTION INTRAVENOUS at 04:06

## 2017-06-27 RX ADMIN — FAMOTIDINE 20 MG: 20 TABLET ORAL at 09:06

## 2017-06-27 RX ADMIN — CLOPIDOGREL BISULFATE 600 MG: 300 TABLET, FILM COATED ORAL at 04:06

## 2017-06-27 RX ADMIN — NITROGLYCERIN 1 INCH: 20 OINTMENT TOPICAL at 05:06

## 2017-06-27 RX ADMIN — RANOLAZINE 500 MG: 500 TABLET, FILM COATED, EXTENDED RELEASE ORAL at 09:06

## 2017-06-27 RX ADMIN — DIPHENHYDRAMINE HYDROCHLORIDE 12.5 MG: 50 INJECTION, SOLUTION INTRAMUSCULAR; INTRAVENOUS at 09:06

## 2017-06-27 RX ADMIN — RANOLAZINE 500 MG: 500 TABLET, FILM COATED, EXTENDED RELEASE ORAL at 08:06

## 2017-06-27 RX ADMIN — NITROGLYCERIN 1 INCH: 20 OINTMENT TOPICAL at 12:06

## 2017-06-27 RX ADMIN — LOSARTAN POTASSIUM 100 MG: 50 TABLET, FILM COATED ORAL at 09:06

## 2017-06-27 RX ADMIN — ONDANSETRON 8 MG: 8 TABLET, ORALLY DISINTEGRATING ORAL at 10:06

## 2017-06-27 RX ADMIN — METOPROLOL TARTRATE 25 MG: 25 TABLET ORAL at 02:06

## 2017-06-27 RX ADMIN — ATORVASTATIN CALCIUM 80 MG: 40 TABLET, FILM COATED ORAL at 09:06

## 2017-06-27 RX ADMIN — SODIUM CHLORIDE, PRESERVATIVE FREE 3 ML: 5 INJECTION INTRAVENOUS at 02:06

## 2017-06-27 RX ADMIN — HEPARIN SODIUM AND DEXTROSE 16 UNITS/KG/HR: 10000; 5 INJECTION INTRAVENOUS at 04:06

## 2017-06-27 RX ADMIN — HYDROCODONE BITARTRATE AND ACETAMINOPHEN 1 TABLET: 5; 325 TABLET ORAL at 09:06

## 2017-06-27 RX ADMIN — POTASSIUM CHLORIDE 40 MEQ: 1500 TABLET, EXTENDED RELEASE ORAL at 09:06

## 2017-06-27 RX ADMIN — SODIUM CHLORIDE: 0.9 INJECTION, SOLUTION INTRAVENOUS at 06:06

## 2017-06-27 RX ADMIN — SODIUM CHLORIDE 125 ML/HR: 0.9 INJECTION, SOLUTION INTRAVENOUS at 04:06

## 2017-06-27 RX ADMIN — ENOXAPARIN SODIUM 40 MG: 100 INJECTION SUBCUTANEOUS at 05:06

## 2017-06-27 NOTE — NURSING
To room from cath lab- pt asleep but arouses to verbal command- post negative cardiac cath- 6F sheath in place to right groin- transduced to pressurized bag of ns at 300mm/hg- line leveled and zeroed- correlates well with NIBP- r groin cath site free from redness, swelling or hematoma- gauze dressing with tegaderm in place- left pedal pulses palplable- right pedal pulses are doppler only- ns infusing at 75ml/hr.  No c/o pain at this time.

## 2017-06-27 NOTE — PLAN OF CARE
D/C assessment completed. Met with the patient . The patient was slight;y drowsy during the assessment. CM called her son to verify info. Patient does live with her son Julian Messer [phone]     06/27/17 1009   Discharge Assessment   Assessment Type Discharge Planning Assessment   Confirmed/corrected address and phone number on facesheet? Yes   Assessment information obtained from? Patient;Medical Record   Expected Length of Stay (days) (TBD)   Communicated expected length of stay with patient/caregiver no   Type of Healthcare Directive Received Advance Directive;Psychiatric directive;Other (Comment)  (Written info givne for Advance Directive, Living Will, and pamphlet for d/c planning begins on admission)   If Healthcare Directive is received, is it scanned into Epic? no (comment)   Prior to hospitilization cognitive status: Alert/Oriented   Prior to hospitalization functional status: Independent   Current cognitive status: Alert/Oriented  (slightly drowsy during the assessment)   Current Functional Status: Needs Assistance   Arrived From home or self-care   Lives With child(dane), adult   Able to Return to Prior Arrangements yes   Is patient able to care for self after discharge? Unable to determine at this time (comments)   How many people do you have in your home that can help with your care after discharge? 2   Who are your caregiver(s) and their phone number(s)? Julian Messer [phone]   Patient's perception of discharge disposition home or selfcare   Readmission Within The Last 30 Days no previous admission in last 30 days   Patient currently being followed by outpatient case management? No   Patient currently receives home health services? No   Does the patient currently use HME? No   Patient currently receives private duty nursing? No   Patient currently receives any other outside agency services? No   Equipment Currently Used at Home none   Do you have any problems affording any of your prescribed  medications? No   Is the patient taking medications as prescribed? yes   Do you have any financial concerns preventing you from receiving the healthcare you need? No   Does the patient have transportation to healthcare appointments? Yes   Transportation Available car;family or friend will provide   On Dialysis? No   Does the patient receive services at the [facility]madin Clinic? No   Are there any open cases? No   Discharge Plan A Home with family   Discharge Plan B Home Health;Home with family   Patient/Family In Agreement With Plan yes    and grandson  Who provides 24 hr care.  Cg stated she has good support and no further needs at this time.

## 2017-06-27 NOTE — OP NOTE
[facility] -   Cardiac Catheterization  Procedure Note    SUMMARY     Charline Messer  [id number]  Za Rey MD    Date of Procedure: 6/27/2017    Procedure: [facility]    Provider: Carlos Messer MD    Assisting Provider: Kunal Francis    Indications: She was referred for cardiac catheterization to further evaluate STEMI.    Pre-Procedure Diagnosis: Anterior STEMI    Post-Procedure Diagnosis:  Same + nonobstructive CAD    Anesthesia: Choice    Description of the Findings of the Procedure:     The risks, benefits, complications, treatment options, and expected outcomes were discussed with the patient. The patient and/or family concurred with the proposed plan, giving informed consent. Patient was brought to the cath lab after IV hydration was begun and oral premedication was given.     Findings:    Nonobstructive CAD  LVEF 55%  Manual pressure R CFA  See report    Complications: None; patient tolerated the procedure well.    Estimated Blood Loss (EBL): Minimal           Implants: none    Specimens:  none    Condition: stable    Disposition: PACU - hemodynamically stable.    Attestation: I was present and scrubbed for the entire procedure.     Recommendations:    Admit to ICU  Medical tx for CAD  Asa  plavix  Cardiac diet

## 2017-06-27 NOTE — ED PROVIDER NOTES
SCRIBE #1 NOTE: I, Sophie Davison, am scribing for, and in the presence of, Ian Webster MD. I have scribed the entire note.      History      Chief Complaint   Patient presents with    Shortness of Breath       Review of patient's allergies indicates:   Allergen Reactions    Codeine      Other reaction(s): Unknown    Ibuprofen      Other reaction(s): Vomiting  Other reaction(s): Itching    Zetia  [ezetimibe]      Other reaction(s): Vomiting        HPI   HPI    6/27/2017, 4:25 AM   History obtained from the patient      History of Present Illness: Charline Messer is a 82 y.o. female patient with PMHx of HTN who presents to the Emergency Department for SOB which onset gradually PTA. Symptoms are constant and moderate in severity. Pt reports she took an Aleve earlier this morning and thinks she had an allergic reaction to it. Pt received Benadryl enroute via EMS. Upon obtaining EKG enroute, EMTs noticed ST elevation on pt's EKG. No mitigating or exacerbating factors reported. Associated sxs include nausea and diaphoresis. Patient denies any fever, chills, CP, palpitations, leg pain/swelling, emesis, diarrhea, abdominal pain, dysuria, weakness/numbness, dizziness, and all other sxs at this time. No further complaints or concerns at this time.       Arrival mode: EMS      PCP: Za Rey MD       Past Medical History:  Past Medical History:   Diagnosis Date    Allergic rhinitis, cause unspecified     Arthritis     Asthma     as a child    Benign colonic polyp     Breast cancer mid 1980s    right; per patient s/p right mastectomy and chemo, unsure about radiation    Depression     Diverticular disease     External hemorrhoids     Gout attack     Hyperlipidemia     Hypertension     Hypothyroidism     Obesity (BMI 30-39.9) 10/24/2016    Osteoarthritis     Peripheral vascular disease     Postmenopausal     Pulmonary embolism     reported per patient; unsure date    ST elevation myocardial infarction  (STEMI) of anterior wall 6/27/2017    Thrombocytopenia     Tinnitus aurium     Vitamin D deficiency disease        Past Surgical History:  Past Surgical History:   Procedure Laterality Date    PARTIAL HYSTERECTOMY      Due to fibroids    Right mastectomy           Family History:  Family History   Problem Relation Age of Onset    Heart disease Mother     Hypertension Mother     Stroke Mother     Hypertension Father     Heart disease Sister     No Known Problems Son     Cancer Neg Hx     Diabetes Neg Hx     Kidney disease Neg Hx        Social History:  Social History     Social History Main Topics    Smoking status: Never Smoker    Smokeless tobacco: Never Used    Alcohol use 2.4 oz/week     4 Cans of beer per week    Drug use: No    Sexual activity: No       ROS   Review of Systems   Constitutional: Positive for diaphoresis. Negative for chills and fever.   HENT: Negative for sore throat.    Respiratory: Positive for shortness of breath.    Cardiovascular: Negative for chest pain, palpitations and leg swelling.   Gastrointestinal: Positive for nausea. Negative for abdominal pain, diarrhea and vomiting.   Genitourinary: Negative for dysuria.   Musculoskeletal: Negative for back pain.   Skin: Negative for rash.   Neurological: Negative for weakness.   Hematological: Does not bruise/bleed easily.   All other systems reviewed and are negative.    Physical Exam      Initial Vitals [06/27/17 0421]   BP Pulse Resp Temp SpO2   (!) 196/91 102 (!) 24 98 °F (36.7 °C) 98 %      MAP       126          Physical Exam  Nursing Notes and Vital Signs Reviewed.  Constitutional: Patient is in no acute distress. Well-developed and well-nourished.  Head: Atraumatic. Normocephalic.  Eyes: PERRL. EOM intact. Conjunctivae are not pale. No scleral icterus.  ENT: Mucous membranes are moist. Oropharynx is clear and symmetric.    Neck: Supple. Full ROM. No lymphadenopathy.  Cardiovascular: Tachycardic. Regular rhythm. No  murmurs, rubs, or gallops. Distal pulses are 2+ and symmetric.  Pulmonary/Chest: No respiratory distress. Clear to auscultation bilaterally. No wheezing, rales, or rhonchi.  Abdominal: Soft and non-distended.  There is no tenderness.  No rebound, guarding, or rigidity. Good bowel sounds.  Genitourinary: No CVA tenderness  Musculoskeletal: Moves all extremities. No obvious deformities. No edema. No calf tenderness.  Skin: Warm and dry.  Neurological:  Alert, awake, and appropriate.  Normal speech.  No acute focal neurological deficits are appreciated.  Psychiatric: Normal affect. Good eye contact. Appropriate in content.    ED Course    Critical Care  Date/Time: 6/27/2017 5:52 AM  Performed by: HUONG JADE.  Authorized by: HUONG JADE.   Direct patient critical care time: 15 minutes  Additional history critical care time: 10 minutes  Ordering / reviewing critical care time: 10 minutes  Documentation critical care time: 5 minutes  Consulting other physicians critical care time: 5 minutes  Total critical care time (exclusive of procedural time) : 45 minutes  Critical care time was exclusive of separately billable procedures and treating other patients and teaching time.  Critical care was necessary to treat or prevent imminent or life-threatening deterioration of the following conditions: cardiac failure.  Critical care was time spent personally by me on the following activities: blood draw for specimens, development of treatment plan with patient or surrogate, discussions with consultants, interpretation of cardiac output measurements, evaluation of patient's response to treatment, examination of patient, obtaining history from patient or surrogate, ordering and performing treatments and interventions, ordering and review of laboratory studies, ordering and review of radiographic studies, pulse oximetry, re-evaluation of patient's condition and review of old charts.  Subsequent provider of critical care: I assumed  direction of critical care for this patient from another provider of my specialty.        ED Vital Signs:  Vitals:    06/27/17 1815 06/27/17 1830 06/27/17 1845 06/27/17 1900   BP:    132/62   Pulse: 77 80 83 80   Resp: (!) 22 (!) 25 16 (!) 22   Temp:    98.9 °F (37.2 °C)   TempSrc:       SpO2: 97% 98% 99% 96%   Weight:       Height:        06/27/17 2000 06/27/17 2100 06/27/17 2200 06/27/17 2300   BP: (!) 118/51 (!) 127/55 (!) 113/52 (!) 116/55   Pulse: 80 81 79 78   Resp: (!) 24 (!) 28 (!) 21 19   Temp:       TempSrc:       SpO2: 97% 98% 99% 99%   Weight:       Height:        06/28/17 0000 06/28/17 0100 06/28/17 0200 06/28/17 0300   BP: (!) 116/55 (!) 123/50 (!) 109/47 (!) 114/53   Pulse: 84 79 79 77   Resp: (!) 22 (!) 23 (!) 21 (!) 21   Temp: 99.2 °F (37.3 °C)      TempSrc:       SpO2: 99% 98% 98% 97%   Weight:       Height:        06/28/17 0400 06/28/17 0427 06/28/17 0500   BP: 136/66  134/68   Pulse: 80 76 84   Resp: 18 20 (!) 26   Temp:      TempSrc:      SpO2: 99% 99% 97%   Weight:      Height:          Abnormal Lab Results:  Labs Reviewed   CBC W/ AUTO DIFFERENTIAL - Abnormal; Notable for the following:        Result Value    RDW 15.0 (*)     Platelets 104 (*)     Eos # 0.6 (*)     All other components within normal limits   COMPREHENSIVE METABOLIC PANEL - Abnormal; Notable for the following:     Potassium 3.4 (*)     Glucose 157 (*)     eGFR if  49 (*)     eGFR if non  43 (*)     All other components within normal limits   TROPONIN I - Abnormal; Notable for the following:     Troponin I 0.042 (*)     All other components within normal limits   URINALYSIS - Abnormal; Notable for the following:     Protein, UA 2+ (*)     Occult Blood UA 1+ (*)     All other components within normal limits   ISTAT ACT-K - Abnormal; Notable for the following:     POC ACTIVATED CLOTTING TIME K 191 (*)     All other components within normal limits   ISTAT ACT-K - Abnormal; Notable for the following:      POC ACTIVATED CLOTTING TIME K 180 (*)     All other components within normal limits   PROTIME-INR   APTT   B-TYPE NATRIURETIC PEPTIDE   DRUG SCREEN PANEL, URINE EMERGENCY   URINALYSIS MICROSCOPIC   TYPE & SCREEN        All Lab Results:  Results for orders placed or performed during the hospital encounter of 06/27/17   CBC auto differential   Result Value Ref Range    WBC 8.49 3.90 - 12.70 K/uL    RBC 4.63 4.00 - 5.40 M/uL    Hemoglobin 13.4 12.0 - 16.0 g/dL    Hematocrit 41.7 37.0 - 48.5 %    MCV 90 82 - 98 fL    MCH 28.9 27.0 - 31.0 pg    MCHC 32.1 32.0 - 36.0 %    RDW 15.0 (H) 11.5 - 14.5 %    Platelets 104 (L) 150 - 350 K/uL    MPV SEE COMMENT 9.2 - 12.9 fL    Gran # 4.4 1.8 - 7.7 K/uL    Lymph # 3.1 1.0 - 4.8 K/uL    Mono # 0.4 0.3 - 1.0 K/uL    Eos # 0.6 (H) 0.0 - 0.5 K/uL    Baso # 0.02 0.00 - 0.20 K/uL    Gran% 51.9 38.0 - 73.0 %    Lymph% 36.5 18.0 - 48.0 %    Mono% 4.7 4.0 - 15.0 %    Eosinophil% 7.3 0.0 - 8.0 %    Basophil% 0.2 0.0 - 1.9 %    Differential Method Automated    Comprehensive metabolic panel   Result Value Ref Range    Sodium 140 136 - 145 mmol/L    Potassium 3.4 (L) 3.5 - 5.1 mmol/L    Chloride 103 95 - 110 mmol/L    CO2 24 23 - 29 mmol/L    Glucose 157 (H) 70 - 110 mg/dL    BUN, Bld 19 8 - 23 mg/dL    Creatinine 1.2 0.5 - 1.4 mg/dL    Calcium 9.0 8.7 - 10.5 mg/dL    Total Protein 7.6 6.0 - 8.4 g/dL    Albumin 3.7 3.5 - 5.2 g/dL    Total Bilirubin 0.6 0.1 - 1.0 mg/dL    Alkaline Phosphatase 124 55 - 135 U/L    AST 24 10 - 40 U/L    ALT 20 10 - 44 U/L    Anion Gap 13 8 - 16 mmol/L    eGFR if African American 49 (A) >60 mL/min/1.73 m^2    eGFR if non African American 43 (A) >60 mL/min/1.73 m^2   Protime-INR   Result Value Ref Range    Prothrombin Time 10.7 9.0 - 12.5 sec    INR 1.0 0.8 - 1.2   APTT   Result Value Ref Range    aPTT 21.0 21.0 - 32.0 sec   Troponin I   Result Value Ref Range    Troponin I 0.042 (H) 0.000 - 0.026 ng/mL   Brain natriuretic peptide   Result Value Ref Range    BNP  26 0 - 99 pg/mL   Urinalysis   Result Value Ref Range    Specimen UA Urine, Catheterized     Color, UA Yellow Yellow, Straw, Yenny    Appearance, UA Clear Clear    pH, UA 6.0 5.0 - 8.0    Specific Gravity, UA 1.020 1.005 - 1.030    Protein, UA 2+ (A) Negative    Glucose, UA Negative Negative    Ketones, UA Negative Negative    Bilirubin (UA) Negative Negative    Occult Blood UA 1+ (A) Negative    Nitrite, UA Negative Negative    Urobilinogen, UA Negative <2.0 EU/dL    Leukocytes, UA Negative Negative   Drug screen panel, emergency   Result Value Ref Range    Benzodiazepines Negative     Methadone metabolites Negative     Cocaine (Metab.) Negative     Opiate Scrn, Ur Presumptive Positive     Barbiturate Screen, Ur Negative     Amphetamine Screen, Ur Negative     THC Negative     Phencyclidine Negative     Creatinine, Random Ur 81.2 15.0 - 325.0 mg/dL    Toxicology Information SEE COMMENT    Urinalysis Microscopic   Result Value Ref Range    RBC, UA 2 0 - 4 /hpf    WBC, UA 2 0 - 5 /hpf    Bacteria, UA Occasional None-Occ /hpf    Hyaline Casts, UA 0 0-1/lpf /lpf    Microscopic Comment SEE COMMENT    Troponin I   Result Value Ref Range    Troponin I 2.427 (H) 0.000 - 0.026 ng/mL   Lipid panel   Result Value Ref Range    Cholesterol 217 (H) 120 - 199 mg/dL    Triglycerides 76 30 - 150 mg/dL    HDL 66 40 - 75 mg/dL    LDL Cholesterol 135.8 63.0 - 159.0 mg/dL    HDL/Chol Ratio 30.4 20.0 - 50.0 %    Total Cholesterol/HDL Ratio 3.3 2.0 - 5.0    Non-HDL Cholesterol 151 mg/dL   Troponin I   Result Value Ref Range    Troponin I 2.529 (H) 0.000 - 0.026 ng/mL   CBC auto differential   Result Value Ref Range    WBC 11.08 3.90 - 12.70 K/uL    RBC 4.27 4.00 - 5.40 M/uL    Hemoglobin 12.3 12.0 - 16.0 g/dL    Hematocrit 38.8 37.0 - 48.5 %    MCV 91 82 - 98 fL    MCH 28.8 27.0 - 31.0 pg    MCHC 31.7 (L) 32.0 - 36.0 %    RDW 15.1 (H) 11.5 - 14.5 %    Platelets 102 (L) 150 - 350 K/uL    MPV SEE COMMENT 9.2 - 12.9 fL    Gran # 8.8 (H) 1.8  - 7.7 K/uL    Lymph # 0.7 (L) 1.0 - 4.8 K/uL    Mono # 0.5 0.3 - 1.0 K/uL    Eos # 1.0 (H) 0.0 - 0.5 K/uL    Baso # 0.02 0.00 - 0.20 K/uL    Gran% 79.0 (H) 38.0 - 73.0 %    Lymph% 6.7 (L) 18.0 - 48.0 %    Mono% 4.8 4.0 - 15.0 %    Eosinophil% 9.3 (H) 0.0 - 8.0 %    Basophil% 0.2 0.0 - 1.9 %    Differential Method Automated    Echocardiogram 2D complete   Result Value Ref Range    EF 50 55 - 65    Est. PA Systolic Pressure 29.83     Mitral Valve Mobility MILDLY RESTRICTED     Tricuspid Valve Regurgitation MILD    Type & Screen   Result Value Ref Range    Group & Rh AB POS     Indirect Yasmani NEG    Cath lab procedure   Result Value Ref Range    Coronary Stenosis >= 50% (A)    ISTAT ACT-K   Result Value Ref Range    POC ACTIVATED CLOTTING TIME K 191 (H) 74 - 137 sec    Sample unknown    ISTAT ACT-K   Result Value Ref Range    POC ACTIVATED CLOTTING TIME K 180 (H) 74 - 137 sec    Sample unknown    ISTAT ACT-K   Result Value Ref Range    POC ACTIVATED CLOTTING TIME K 136 74 - 137 sec    Sample unknown          Imaging Results:  Imaging Results          IR Heart Cath Images (In process)                X-Ray Chest AP Portable (Final result)  Result time 06/27/17 08:35:49    Final result by Nettie Lee MD (Timothy) (06/27/17 08:35:49)                 Impression:     Normal sized heart.Clear lungs.  Surgical clips in the right axilla. Comparison 05/02/2016.      Electronically signed by: NETTIE LEE MD  Date:     06/27/17  Time:    08:35              Narrative:    Chest, 1 view.    Clinical History: Chest pain                            Per ED physician, pt's CXR results indicate previous mastectomy. NAF.     EMS EKG  The EKG was ordered, reviewed, and independently interpreted by the ED provider.  Interpretation time: 0407  Rate: 100 BPM  Rhythm: sinus tachycardia  Interpretation: Acute ST elevation anterior infarct.    ED EKG  The EKG was ordered, reviewed, and independently interpreted by the ED  provider.  Interpretation time: 0437  Rate: 100 BPM  Rhythm: normal sinus rhythm  Interpretation: Nonspecific T wave abnormality. No STEMI.         The Emergency Provider reviewed the vital signs and test results, which are outlined above.    ED Discussion     4:25 AM: Dr. Webster discussed the pt's case with Dr. Messer (Cardiology) who will come to evaluate pt in the ED.    5:00 AM: Dr. Messer will take patient straight to cath lab.    5:14 AM: Discussed case with Dr. Messer (Cardiology). Dr. Messer agrees with current care and management of pt and accepts admission.   Admitting Service: Cardiology  Admitting Physician: Dr. Messer  Admit to: ICU    5:14 AM: Re-evaluated pt. I have discussed test results, shared treatment plan, and the need for admission with patient and family at bedside. Pt and family express understanding at this time and agree with all information. All questions answered. Pt and family have no further questions or concerns at this time. Pt is ready for admit.      ED Medication(s):  Medications   losartan tablet 100 mg (100 mg Oral Given 6/27/17 0911)   sodium chloride 0.9% injection 3 mL (3 mLs Intravenous Given 6/27/17 2103)   0.9%  NaCl infusion ( Intravenous Verify Only 6/28/17 0500)   morphine injection 2 mg (not administered)   hydrocodone-acetaminophen 5-325mg per tablet 1 tablet (1 tablet Oral Given 6/27/17 0922)   ondansetron disintegrating tablet 8 mg (8 mg Oral Given 6/27/17 1031)   metoprolol tartrate (LOPRESSOR) tablet 25 mg (25 mg Oral Given 6/28/17 0512)   nitroGLYCERIN 2% TD oint ointment 1 inch (1 inch Topical Given 6/28/17 0512)   aspirin EC tablet 81 mg (not administered)   clopidogrel tablet 75 mg (not administered)   atorvastatin tablet 80 mg (80 mg Oral Given 6/27/17 0917)   diphenhydrAMINE injection 25 mg (not administered)   hydrALAZINE injection 10 mg (not administered)   ranolazine 12 hr tablet 500 mg (500 mg Oral Given 6/27/17 2047)   famotidine tablet 20 mg (20 mg Oral  Given 6/27/17 0910)   pneumoc 13-marichuy conj-dip cr(PF) 0.5 mL (0.5 mLs Intramuscular Not Given 6/27/17 0830)   enoxaparin injection 40 mg (40 mg Subcutaneous Given 6/27/17 1705)   albuterol sulfate nebulizer solution 2.5 mg (2.5 mg Nebulization Given 6/28/17 0427)   clopidogrel tablet 600 mg (600 mg Oral Given 6/27/17 0432)   heparin 25,000 units in dextrose 5% 250 mL (100 units/mL) bolus from bag; INITIAL BOLUS DOSE (5,320 Units Intravenous Bolus from Bag 6/27/17 0442)   metoprolol injection 5 mg (5 mg Intravenous Given 6/27/17 0427)   metoprolol injection 5 mg (5 mg Intravenous Given 6/27/17 0437)   potassium chloride SA CR tablet 40 mEq (40 mEq Oral Given 6/27/17 0911)   diphenhydrAMINE injection 12.5 mg (12.5 mg Intravenous Given 6/27/17 0911)       Current Discharge Medication List                Medical Decision Making    Medical Decision Making:   Clinical Tests:   Lab Tests: Ordered and Reviewed  Radiological Study: Ordered and Reviewed  Medical Tests: Ordered and Reviewed           Scribe Attestation:   Scribe #1: I performed the above scribed service and the documentation accurately describes the services I performed. I attest to the accuracy of the note.    Attending:   Physician Attestation Statement for Scribe #1: I, Ian Webster MD, personally performed the services described in this documentation, as scribed by Sophie Davison, in my presence, and it is both accurate and complete.          Clinical Impression       ICD-10-CM ICD-9-CM   1. AMI anterior wall I21.09 410.10   2. ST elevation myocardial infarction (STEMI), unspecified artery I21.3 410.90   3. Essential (primary) hypertension I10 401.9   4. Chronic ischemic heart disease I25.9 414.9   5. ST elevation myocardial infarction (STEMI) of anterior wall I21.09 410.10       Disposition:   Disposition: Admitted  Condition: Critical         Ian Webster MD  06/28/17 1352

## 2017-06-27 NOTE — H&P
[facility] -   Cardiology  History and Physical     Patient Name: Charline Messer  MRN: [id number]  Admission Date: 6/27/2017  Code Status: No Order   Attending Provider: Ian Webster MD   Primary Care Physician: Za Rey MD  Principal Problem:ST elevation myocardial infarction (STEMI) of anterior wall    Patient information was obtained from patient, past medical records and ER records.     Subjective:     Chief Complaint:  Chest Discomfort, dyspnea     HPI:  Pt presents by EMS with c/o chest heaviness and dyspnea.  Sxs started early this am.  ECG on arrival to ER showed NSR, anterior ST elevation consistent with STEMI.  Code STEMI activated.  Denies h/o CAD.  Nonsmoker.    Patient Active Problem List   Diagnosis    HTN (hypertension)    Allergic rhinitis, cause unspecified    Hyperlipidemia    Generalized osteoarthrosis, involving multiple sites    Gout, arthritis    Hypothyroidism    Vitamin D insufficiency    PAD (peripheral artery disease)    Atherosclerosis of both carotid arteries    History of breast cancer    Thrombocytopenia    Obesity (BMI 30-39.9)    ST elevation myocardial infarction (STEMI) of anterior wall         Past Medical History:   Diagnosis Date    Allergic rhinitis, cause unspecified     Arthritis     Asthma     as a child    Benign colonic polyp     Breast cancer mid 1980s    right; per patient s/p right mastectomy and chemo, unsure about radiation    Depression     Diverticular disease     External hemorrhoids     Gout attack     Hyperlipidemia     Hypertension     Hypothyroidism     Obesity (BMI 30-39.9) 10/24/2016    Osteoarthritis     Peripheral vascular disease     Postmenopausal     Pulmonary embolism     reported per patient; unsure date    ST elevation myocardial infarction (STEMI) of anterior wall 6/27/2017    Thrombocytopenia     Tinnitus aurium     Vitamin D deficiency disease        Past Surgical History:   Procedure Laterality Date     PARTIAL HYSTERECTOMY      Due to fibroids    Right mastectomy         Review of patient's allergies indicates:   Allergen Reactions    Codeine      Other reaction(s): Unknown    Ibuprofen      Other reaction(s): Vomiting  Other reaction(s): Itching    Zetia  [ezetimibe]      Other reaction(s): Vomiting       Current Facility-Administered Medications on File Prior to Encounter   Medication    lidocaine-EPINEPHrine 1%-1:100,000 injection 5 mL     Current Outpatient Prescriptions on File Prior to Encounter   Medication Sig    aspirin (ECOTRIN) 81 MG EC tablet Take 1 tablet by mouth Daily.    colchicine 0.6 mg tablet Take 1 pill daily prn    diclofenac sodium (VOLTAREN) 1 % Gel Apply 2 g topically 2 (two) times daily.    fluticasone (FLONASE) 50 mcg/actuation nasal spray 2 sprays by Each Nare route daily as needed for Rhinitis.    furosemide (LASIX) 20 MG tablet Take 1 tablet by mouth Daily. prn    hydrocodone-acetaminophen 5-325mg (NORCO) 5-325 mg per tablet Take 1 tablet by mouth every 4 (four) hours as needed.    losartan (COZAAR) 100 MG tablet TAKE ONE TABLET BY MOUTH ONCE DAILY     Family History     Problem Relation (Age of Onset)    Heart disease Mother, Sister    Hypertension Mother, Father    No Known Problems Son    Stroke Mother        Social History Main Topics    Smoking status: Never Smoker    Smokeless tobacco: Never Used    Alcohol use 2.4 oz/week     4 Cans of beer per week    Drug use: No    Sexual activity: No     Review of Systems   Constitution: Negative.   HENT: Negative.    Eyes: Negative.    Cardiovascular: Positive for chest pain and dyspnea on exertion.   Respiratory: Positive for shortness of breath.    Endocrine: Negative.    Hematologic/Lymphatic: Negative.    Skin: Negative.    Musculoskeletal: Positive for arthritis and joint pain.   Gastrointestinal: Negative.    Genitourinary: Negative.    Neurological: Negative.    Psychiatric/Behavioral: Negative.     Allergic/Immunologic: Negative.      Objective:     Vital Signs (Most Recent):  Temp: 98 °F (36.7 °C) (06/27/17 0421)  Pulse: 102 (06/27/17 0421)  Resp: (!) 24 (06/27/17 0421)  BP: (!) 196/91 (06/27/17 0421)  SpO2: 98 % (06/27/17 0421) Vital Signs (24h Range):  Temp:  [98 °F (36.7 °C)] 98 °F (36.7 °C)  Pulse:  [102] 102  Resp:  [24] 24  SpO2:  [98 %] 98 %  BP: (196)/(91) 196/91     Weight: 76 kg (167 lb 8 oz)  Body mass index is 31.65 kg/m².    SpO2: 98 %  O2 Device (Oxygen Therapy): nasal cannula    No intake or output data in the 24 hours ending 06/27/17 0454    Lines/Drains/Airways     Peripheral Intravenous Line                 Peripheral IV - Single Lumen 06/27/17 0437 Right Antecubital less than 1 day         Peripheral IV - Single Lumen 06/27/17 0445 Right Hand less than 1 day         Peripheral IV - Single Lumen 06/27/17 Left Forearm less than 1 day                Physical Exam   Constitutional: She is oriented to person, place, and time. She appears well-developed and well-nourished. No distress.   HENT:   Head: Normocephalic and atraumatic.   Eyes: EOM are normal.   Neck: Normal range of motion. Neck supple. Normal carotid pulses, no hepatojugular reflux and no JVD present. Carotid bruit is not present. No tracheal deviation present. No thyromegaly present.   Cardiovascular: Normal rate, regular rhythm, S1 normal and S2 normal.  Exam reveals no gallop, no S3 and no S4.    No murmur heard.  Pulses:       Radial pulses are 2+ on the right side, and 2+ on the left side.        Femoral pulses are 2+ on the right side, and 2+ on the left side.       Dorsalis pedis pulses are 0 on the right side, and 0 on the left side.        Posterior tibial pulses are 0 on the right side, and 0 on the left side.   Pulmonary/Chest: Effort normal and breath sounds normal.   Abdominal: Soft. Bowel sounds are normal. She exhibits no distension, no abdominal bruit and no mass. There is no tenderness. There is no rebound and no  guarding.   Musculoskeletal: She exhibits no edema.   Lymphadenopathy:     She has no cervical adenopathy.   Neurological: She is alert and oriented to person, place, and time.   Skin: Skin is warm. She is not diaphoretic.   Psychiatric: She has a normal mood and affect.   Nursing note and vitals reviewed.      Significant Labs:   CMP No results for input(s): NA, K, CL, CO2, GLU, BUN, CREATININE, CALCIUM, PROT, ALBUMIN, BILITOT, ALKPHOS, AST, ALT, ANIONGAP, ESTGFRAFRICA, EGFRNONAA in the last 48 hours., CBC   Recent Labs  Lab 06/27/17  0425   WBC 8.49   HGB 13.4   HCT 41.7   *    and Troponin No results for input(s): TROPONINI in the last 48 hours.      Assessment and Plan:     Active Diagnoses:    Diagnosis Date Noted POA    PRINCIPAL PROBLEM:  ST elevation myocardial infarction (STEMI) of anterior wall [I21.09] 06/27/2017 Yes      Problems Resolved During this Admission:    Diagnosis Date Noted Date Resolved POA       VTE Risk Mitigation     None          ANTERIOR STEMI  LHC + POSSIBLE PCI.  PT INFORMED OF INDICATIONS FOR LHC/PCI, ALL RISKS/BENEFITS.  PT SEEMED TO UNDERSTAND AND WAS AGREEABLE TO PROCEEDING.  FURTHER QUESTIONS TO FOLLOW CATH.            Carlos Messer MD  Cardiology   [facility] - [place]

## 2017-06-27 NOTE — NURSING
Np juliette notified about marginal urinary output despite increasing IV fluids. Will monitor over night and reassess in am

## 2017-06-27 NOTE — PLAN OF CARE
Problem: Patient Care Overview  Goal: Plan of Care Review  Outcome: Ongoing (interventions implemented as appropriate)  Sheath removed at 0830. Pt off of bedrest at 1430. Site closely monitored. EKG monitored. Pain controlled, see MAR. Urine output marginal. Np Mitchell aware. Fall precautions in place.  and son updated on plan of care

## 2017-06-28 LAB
ALBUMIN SERPL BCP-MCNC: 2.7 G/DL
ALP SERPL-CCNC: 88 U/L
ALT SERPL W/O P-5'-P-CCNC: 18 U/L
ANION GAP SERPL CALC-SCNC: 6 MMOL/L
AST SERPL-CCNC: 23 U/L
BASOPHILS # BLD AUTO: 0.02 K/UL
BASOPHILS NFR BLD: 0.2 %
BILIRUB SERPL-MCNC: 0.3 MG/DL
BUN SERPL-MCNC: 19 MG/DL
CALCIUM SERPL-MCNC: 7.8 MG/DL
CHLORIDE SERPL-SCNC: 107 MMOL/L
CO2 SERPL-SCNC: 26 MMOL/L
CREAT SERPL-MCNC: 1.1 MG/DL
DIFFERENTIAL METHOD: ABNORMAL
EOSINOPHIL # BLD AUTO: 1 K/UL
EOSINOPHIL NFR BLD: 9.3 %
ERYTHROCYTE [DISTWIDTH] IN BLOOD BY AUTOMATED COUNT: 15.1 %
EST. GFR  (AFRICAN AMERICAN): 55 ML/MIN/1.73 M^2
EST. GFR  (NON AFRICAN AMERICAN): 48 ML/MIN/1.73 M^2
GLUCOSE SERPL-MCNC: 88 MG/DL
HCT VFR BLD AUTO: 38.8 %
HGB BLD-MCNC: 12.3 G/DL
LYMPHOCYTES # BLD AUTO: 0.7 K/UL
LYMPHOCYTES NFR BLD: 6.7 %
MCH RBC QN AUTO: 28.8 PG
MCHC RBC AUTO-ENTMCNC: 31.7 %
MCV RBC AUTO: 91 FL
MONOCYTES # BLD AUTO: 0.5 K/UL
MONOCYTES NFR BLD: 4.8 %
NEUTROPHILS # BLD AUTO: 8.8 K/UL
NEUTROPHILS NFR BLD: 79 %
PLATELET # BLD AUTO: 102 K/UL
PMV BLD AUTO: ABNORMAL FL
POTASSIUM SERPL-SCNC: 4.5 MMOL/L
PROT SERPL-MCNC: 5.7 G/DL
RBC # BLD AUTO: 4.27 M/UL
SODIUM SERPL-SCNC: 139 MMOL/L
TROPONIN I SERPL DL<=0.01 NG/ML-MCNC: 1.07 NG/ML
WBC # BLD AUTO: 11.08 K/UL

## 2017-06-28 PROCEDURE — 94640 AIRWAY INHALATION TREATMENT: CPT

## 2017-06-28 PROCEDURE — 63600175 PHARM REV CODE 636 W HCPCS: Performed by: NURSE PRACTITIONER

## 2017-06-28 PROCEDURE — 80053 COMPREHEN METABOLIC PANEL: CPT

## 2017-06-28 PROCEDURE — 25000242 PHARM REV CODE 250 ALT 637 W/ HCPCS: Performed by: INTERNAL MEDICINE

## 2017-06-28 PROCEDURE — 84484 ASSAY OF TROPONIN QUANT: CPT

## 2017-06-28 PROCEDURE — 25000003 PHARM REV CODE 250: Performed by: INTERNAL MEDICINE

## 2017-06-28 PROCEDURE — 99231 SBSQ HOSP IP/OBS SF/LOW 25: CPT | Mod: ,,, | Performed by: INTERNAL MEDICINE

## 2017-06-28 PROCEDURE — 36415 COLL VENOUS BLD VENIPUNCTURE: CPT

## 2017-06-28 PROCEDURE — 99232 SBSQ HOSP IP/OBS MODERATE 35: CPT | Mod: ,,, | Performed by: INTERNAL MEDICINE

## 2017-06-28 PROCEDURE — 25000003 PHARM REV CODE 250: Performed by: NURSE PRACTITIONER

## 2017-06-28 PROCEDURE — 85025 COMPLETE CBC W/AUTO DIFF WBC: CPT

## 2017-06-28 PROCEDURE — 21400001 HC TELEMETRY ROOM

## 2017-06-28 RX ORDER — ALBUTEROL SULFATE 2.5 MG/.5ML
2.5 SOLUTION RESPIRATORY (INHALATION) EVERY 4 HOURS PRN
Status: DISCONTINUED | OUTPATIENT
Start: 2017-06-28 | End: 2017-06-29 | Stop reason: HOSPADM

## 2017-06-28 RX ADMIN — RANOLAZINE 500 MG: 500 TABLET, FILM COATED, EXTENDED RELEASE ORAL at 09:06

## 2017-06-28 RX ADMIN — ALBUTEROL SULFATE 2.5 MG: 2.5 SOLUTION RESPIRATORY (INHALATION) at 04:06

## 2017-06-28 RX ADMIN — ASPIRIN 81 MG: 81 TABLET, COATED ORAL at 08:06

## 2017-06-28 RX ADMIN — FAMOTIDINE 20 MG: 20 TABLET ORAL at 08:06

## 2017-06-28 RX ADMIN — SODIUM CHLORIDE, PRESERVATIVE FREE 3 ML: 5 INJECTION INTRAVENOUS at 02:06

## 2017-06-28 RX ADMIN — CLOPIDOGREL BISULFATE 75 MG: 75 TABLET ORAL at 08:06

## 2017-06-28 RX ADMIN — ATORVASTATIN CALCIUM 80 MG: 40 TABLET, FILM COATED ORAL at 08:06

## 2017-06-28 RX ADMIN — SODIUM CHLORIDE, PRESERVATIVE FREE 3 ML: 5 INJECTION INTRAVENOUS at 09:06

## 2017-06-28 RX ADMIN — METOPROLOL TARTRATE 25 MG: 25 TABLET ORAL at 05:06

## 2017-06-28 RX ADMIN — LOSARTAN POTASSIUM 100 MG: 50 TABLET, FILM COATED ORAL at 08:06

## 2017-06-28 RX ADMIN — METOPROLOL TARTRATE 25 MG: 25 TABLET ORAL at 02:06

## 2017-06-28 RX ADMIN — NITROGLYCERIN 1 INCH: 20 OINTMENT TOPICAL at 06:06

## 2017-06-28 RX ADMIN — METOPROLOL TARTRATE 25 MG: 25 TABLET ORAL at 09:06

## 2017-06-28 RX ADMIN — ALBUTEROL SULFATE 2.5 MG: 2.5 SOLUTION RESPIRATORY (INHALATION) at 10:06

## 2017-06-28 RX ADMIN — NITROGLYCERIN 1 INCH: 20 OINTMENT TOPICAL at 12:06

## 2017-06-28 RX ADMIN — RANOLAZINE 500 MG: 500 TABLET, FILM COATED, EXTENDED RELEASE ORAL at 08:06

## 2017-06-28 RX ADMIN — SODIUM CHLORIDE: 0.9 INJECTION, SOLUTION INTRAVENOUS at 12:06

## 2017-06-28 RX ADMIN — NITROGLYCERIN 1 INCH: 20 OINTMENT TOPICAL at 05:06

## 2017-06-28 RX ADMIN — ENOXAPARIN SODIUM 40 MG: 100 INJECTION SUBCUTANEOUS at 05:06

## 2017-06-28 NOTE — PROGRESS NOTES
Pt transferred to room 215 via wheelchair with telemetry monitor in place.  Report given to Xiang.   and son at bedside.

## 2017-06-28 NOTE — PROGRESS NOTES
Pulmonary Note    History reviewed , patient examined, lab and radiographic studies reviewed.  Interval history:   No further chest pain  No new complaints    Exam stable.  Unremarkable      Assessment:  Patient Active Problem List   Diagnosis    HTN (hypertension)    Allergic rhinitis, cause unspecified    Hyperlipidemia    Generalized osteoarthrosis, involving multiple sites    Gout, arthritis    Hypothyroidism    Vitamin D insufficiency    PAD (peripheral artery disease)    Atherosclerosis of both carotid arteries    History of breast cancer    Thrombocytopenia    Obesity (BMI 30-39.9)    ST elevation myocardial infarction (STEMI) of anterior wall    AMI anterior wall       Plan:  No new suggestions  Will sign off  Please re consult if any new problems develop    Javier Lerma MD  Pulmonary / Critical Care Medicine

## 2017-06-28 NOTE — PLAN OF CARE
Problem: Patient Care Overview  Goal: Plan of Care Review  Outcome: Ongoing (interventions implemented as appropriate)  POC and interventions discussed with patient and spouse who has remained at bedside all night.  VU.  Heart with RRR.  Denies CP.  Oxygenating well on Nasal cannula.  Denies pain at present.  R groin d/c'd sheath site wnl.  R foot pulses palpable +1

## 2017-06-28 NOTE — PLAN OF CARE
06/28/17 1140   Discharge Reassessment   Assessment Type Discharge Planning Reassessment   Can the patient answer the patient profile reliably? Yes, cognitively intact   How does the patient rate their overall health at the present time? Good   Describe the patient's ability to walk at the present time. Minor restrictions or changes   How often would a person be available to care for the patient? Often   Number of comorbid conditions (as recorded on the chart) Five or more   During the past month, has the patient often been bothered by feeling down, depressed or hopeless? No   During the past month, has the patient often been bothered by little interest or pleasure in doing things? No   Discharge Plan A Home with family   Discharge Plan B Home with family   Change in patient condition or support system No   Patient choice form signed by patient/caregiver N/A

## 2017-06-28 NOTE — NURSING
Pt received to room 215 from ICU. Phoebe RN called report prior to transport. Heart monitor in place.

## 2017-06-28 NOTE — PLAN OF CARE
Met with patient while sitting in the chair. Per MDR, patient to transfer to Tele floor today. Patient stated  She plans to return home post hospitalization. CM to f/u  For      06/28/17 1140   Discharge Reassessment   Assessment Type Discharge Planning Reassessment   Can the patient answer the patient profile reliably? Yes, cognitively intact   How does the patient rate their overall health at the present time? Good   Describe the patient's ability to walk at the present time. Minor restrictions or changes   How often would a person be available to care for the patient? Often   Number of comorbid conditions (as recorded on the chart) Five or more   During the past month, has the patient often been bothered by feeling down, depressed or hopeless? No   During the past month, has the patient often been bothered by little interest or pleasure in doing things? No   Discharge Plan A Home with family   Discharge Plan B Home with family   Change in patient condition or support system No   Patient choice form signed by patient/caregiver N/A   Explained to the the patient/caregiver why the discharge planned changed: (Transferr to [facility] [place] and possible d/c in 24 hr after being on the floor without complications)    needs.

## 2017-06-28 NOTE — PROGRESS NOTES
Cardiology Progress Note        SUBJECTIVE:   Patient is 1 day angiogram after presenting with STEMI. She was noted to have nonobstructive CAD. 2D echo shows normal LVF. She had brief atypical right sided chest pain overnight. No sob, palpitations, dizziness, near syncope or syncope. Denies any right femoral access complaints. Labs and vitals  stable. Tolerating meds      OBJECTIVE:     Vital Signs (Most Recent)  Temp: 97.9 °F (36.6 °C) (06/28/17 0730)  Pulse: 84 (06/28/17 0900)  Resp: (!) 25 (06/28/17 0900)  BP: (!) 143/57 (06/28/17 0900)  SpO2: 97 % (06/28/17 0900)    Vital Signs Range (Last 24H):  Temp:  [97.5 °F (36.4 °C)-99.2 °F (37.3 °C)]   Pulse:  [71-88]   Resp:  [15-28]   BP: ()/(40-90)   SpO2:  [93 %-100 %]     Intake/Output last 3 shifts:  I/O last 3 completed shifts:  In: 2890.2 [P.O.:220; I.V.:2670.2]  Out: 1530 [Urine:1530]    Intake/Output this shift:  I/O this shift:  In: 569.6 [P.O.:480; I.V.:89.6]  Out: 310 [Urine:310]    Review of patient's allergies indicates:   Allergen Reactions    Codeine      Other reaction(s): Unknown    Ibuprofen      Other reaction(s): Vomiting  Other reaction(s): Itching    Zetia  [ezetimibe]      Other reaction(s): Vomiting       Current Facility-Administered Medications   Medication    albuterol sulfate nebulizer solution 2.5 mg    aspirin EC tablet 81 mg    atorvastatin tablet 80 mg    clopidogrel tablet 75 mg    diphenhydrAMINE injection 25 mg    enoxaparin injection 40 mg    famotidine tablet 20 mg    hydrALAZINE injection 10 mg    hydrocodone-acetaminophen 5-325mg per tablet 1 tablet    losartan tablet 100 mg    metoprolol tartrate (LOPRESSOR) tablet 25 mg    morphine injection 2 mg    nitroGLYCERIN 2% TD oint ointment 1 inch    ondansetron disintegrating tablet 8 mg    pneumoc 13-marichuy conj-dip cr(PF) 0.5 mL    ranolazine 12 hr tablet 500 mg    sodium chloride 0.9% injection 3 mL     No current facility-administered medications on file prior  to encounter.      Current Outpatient Prescriptions on File Prior to Encounter   Medication Sig    aspirin (ECOTRIN) 81 MG EC tablet Take 1 tablet by mouth Daily.    colchicine 0.6 mg tablet Take 1 pill daily prn    diclofenac sodium (VOLTAREN) 1 % Gel Apply 2 g topically 2 (two) times daily.    fluticasone (FLONASE) 50 mcg/actuation nasal spray 2 sprays by Each Nare route daily as needed for Rhinitis.    furosemide (LASIX) 20 MG tablet Take 1 tablet by mouth Daily. prn    hydrocodone-acetaminophen 5-325mg (NORCO) 5-325 mg per tablet Take 1 tablet by mouth every 4 (four) hours as needed.    losartan (COZAAR) 100 MG tablet TAKE ONE TABLET BY MOUTH ONCE DAILY       Physical Exam:  General appearance: alert, appears stated age and cooperative  Head: Normocephalic, without obvious abnormality, atraumatic  Neck: no adenopathy, no carotid bruit, no JVD, supple  Lungs:  clear to auscultation bilaterally  Chest wall: no tenderness  Heart: regular rate and rhythm, S1, S2 normal, no murmur, click, rub or gallop  Abdomen: soft, non-tender; bowel sounds normal; no masses,  no organomegaly  Extremities: extremities normal, atraumatic, no cyanosis or edema. Right groin access site without redness, tenderness, swelling, or drainage. There is no active external bleeding or hematoma.   Pulses: 2+ and symmetric  Skin: Skin color, texture, turgor normal. No rashes or lesions  Neurologic: Grossly normal    Laboratory:  Chemistry:   Lab Results   Component Value Date     06/28/2017    K 4.5 06/28/2017     06/28/2017    CO2 26 06/28/2017    BUN 19 06/28/2017    CREATININE 1.1 06/28/2017    CALCIUM 7.8 (L) 06/28/2017     Cardiac Markers:   Lab Results   Component Value Date    CKMB Unable to Calculate 01/11/2013    TROPONINI 1.070 (H) 06/28/2017    TROPONINI <0.04 01/11/2013     Cardiac Markers (Last 3):   Lab Results   Component Value Date    CKMB Unable to Calculate 01/11/2013    TROPONINI 1.070 (H) 06/28/2017     TROPONINI 2.529 (H) 06/27/2017    TROPONINI 2.427 (H) 06/27/2017    TROPONINI <0.04 01/11/2013     CBC:   Lab Results   Component Value Date    WBC 11.08 06/28/2017    HGB 12.3 06/28/2017    HCT 38.8 06/28/2017    MCV 91 06/28/2017     (L) 06/28/2017     Lipids:   Lab Results   Component Value Date    CHOL 217 (H) 06/27/2017    TRIG 76 06/27/2017    HDL 66 06/27/2017     Coagulation:   Lab Results   Component Value Date    INR 1.0 06/27/2017    APTT 21.0 06/27/2017       Diagnostic Results:  ECG: Reviewed  X-Ray: Reviewed  Echo: Reviewed      ASSESSMENT/PLAN:     Patient Active Problem List   Diagnosis    HTN (hypertension)    Allergic rhinitis, cause unspecified    Hyperlipidemia    Generalized osteoarthrosis, involving multiple sites    Gout, arthritis    Hypothyroidism    Vitamin D insufficiency    PAD (peripheral artery disease)    Atherosclerosis of both carotid arteries    History of breast cancer    Thrombocytopenia    Obesity (BMI 30-39.9)    ST elevation myocardial infarction (STEMI) of anterior wall    AMI anterior wall        Plan:   Patient has been examined and is clear to transfer to [facility]. Will observe her today and if patient remains stable, will dc home tomorrow. Patient to ambulate today. Check EKG in the morning.     Chart reviewed. Patient examined by Dr. Messer and agrees with plan that has been outlined.   .

## 2017-06-28 NOTE — PLAN OF CARE
Problem: Patient Care Overview  Goal: Plan of Care Review  Outcome: Ongoing (interventions implemented as appropriate)  Pt sits up in chair most of the day.  Denies pain or discomfort.  No ectopy noted on cardiac monitor.  Pt is awaiting Telemetry bed.   and pt updated on POC at bedside.

## 2017-06-29 VITALS
BODY MASS INDEX: 29.84 KG/M2 | RESPIRATION RATE: 20 BRPM | DIASTOLIC BLOOD PRESSURE: 74 MMHG | HEART RATE: 77 BPM | TEMPERATURE: 97 F | OXYGEN SATURATION: 96 % | WEIGHT: 158.06 LBS | HEIGHT: 61 IN | SYSTOLIC BLOOD PRESSURE: 145 MMHG

## 2017-06-29 PROCEDURE — 93010 ELECTROCARDIOGRAM REPORT: CPT | Mod: S$GLB,,, | Performed by: INTERNAL MEDICINE

## 2017-06-29 PROCEDURE — 25000003 PHARM REV CODE 250: Performed by: INTERNAL MEDICINE

## 2017-06-29 PROCEDURE — 25000003 PHARM REV CODE 250: Performed by: NURSE PRACTITIONER

## 2017-06-29 PROCEDURE — 93005 ELECTROCARDIOGRAM TRACING: CPT

## 2017-06-29 PROCEDURE — 99238 HOSP IP/OBS DSCHRG MGMT 30/<: CPT | Mod: ,,, | Performed by: INTERNAL MEDICINE

## 2017-06-29 PROCEDURE — 99900035 HC TECH TIME PER 15 MIN (STAT)

## 2017-06-29 PROCEDURE — 94761 N-INVAS EAR/PLS OXIMETRY MLT: CPT

## 2017-06-29 RX ORDER — ISOSORBIDE MONONITRATE 30 MG/1
30 TABLET, EXTENDED RELEASE ORAL DAILY
Status: DISCONTINUED | OUTPATIENT
Start: 2017-06-29 | End: 2017-06-29 | Stop reason: HOSPADM

## 2017-06-29 RX ORDER — ISOSORBIDE MONONITRATE 30 MG/1
30 TABLET, EXTENDED RELEASE ORAL DAILY
Qty: 30 TABLET | Refills: 11 | Status: SHIPPED | OUTPATIENT
Start: 2017-06-29 | End: 2018-07-16 | Stop reason: SDUPTHER

## 2017-06-29 RX ORDER — RANOLAZINE 500 MG/1
500 TABLET, EXTENDED RELEASE ORAL 2 TIMES DAILY
Qty: 60 TABLET | Refills: 11 | Status: SHIPPED | OUTPATIENT
Start: 2017-06-29 | End: 2018-02-05

## 2017-06-29 RX ORDER — CLOPIDOGREL BISULFATE 75 MG/1
75 TABLET ORAL DAILY
Qty: 30 TABLET | Refills: 11 | Status: SHIPPED | OUTPATIENT
Start: 2017-06-29 | End: 2018-07-16 | Stop reason: SDUPTHER

## 2017-06-29 RX ORDER — METOPROLOL TARTRATE 25 MG/1
25 TABLET, FILM COATED ORAL 2 TIMES DAILY
Qty: 60 TABLET | Refills: 11 | Status: SHIPPED | OUTPATIENT
Start: 2017-06-29 | End: 2018-02-05 | Stop reason: SDUPTHER

## 2017-06-29 RX ORDER — ATORVASTATIN CALCIUM 80 MG/1
80 TABLET, FILM COATED ORAL DAILY
Qty: 30 TABLET | Refills: 11 | Status: SHIPPED | OUTPATIENT
Start: 2017-06-29 | End: 2018-07-16 | Stop reason: SDUPTHER

## 2017-06-29 RX ORDER — ALBUTEROL SULFATE 90 UG/1
2 AEROSOL, METERED RESPIRATORY (INHALATION) EVERY 6 HOURS PRN
Qty: 1 INHALER | Refills: 0 | Status: SHIPPED | OUTPATIENT
Start: 2017-06-29 | End: 2017-08-02

## 2017-06-29 RX ADMIN — FAMOTIDINE 20 MG: 20 TABLET ORAL at 09:06

## 2017-06-29 RX ADMIN — RANOLAZINE 500 MG: 500 TABLET, FILM COATED, EXTENDED RELEASE ORAL at 09:06

## 2017-06-29 RX ADMIN — METOPROLOL TARTRATE 25 MG: 25 TABLET ORAL at 06:06

## 2017-06-29 RX ADMIN — NITROGLYCERIN 1 INCH: 20 OINTMENT TOPICAL at 06:06

## 2017-06-29 RX ADMIN — NITROGLYCERIN 1 INCH: 20 OINTMENT TOPICAL at 01:06

## 2017-06-29 RX ADMIN — LOSARTAN POTASSIUM 100 MG: 50 TABLET, FILM COATED ORAL at 09:06

## 2017-06-29 RX ADMIN — ASPIRIN 81 MG: 81 TABLET, COATED ORAL at 09:06

## 2017-06-29 RX ADMIN — ISOSORBIDE MONONITRATE 30 MG: 30 TABLET, EXTENDED RELEASE ORAL at 12:06

## 2017-06-29 RX ADMIN — ATORVASTATIN CALCIUM 80 MG: 40 TABLET, FILM COATED ORAL at 09:06

## 2017-06-29 RX ADMIN — SODIUM CHLORIDE, PRESERVATIVE FREE 3 ML: 5 INJECTION INTRAVENOUS at 07:06

## 2017-06-29 RX ADMIN — CLOPIDOGREL BISULFATE 75 MG: 75 TABLET ORAL at 09:06

## 2017-06-29 NOTE — PROGRESS NOTES
EKG performed on pt as ordered by MD. 'Abnormal EKG' resulted. Unconfirmed copy labeled with pt ID sticker, placed in pt chart and Arely patient's nurse, notified by RT.

## 2017-06-29 NOTE — DISCHARGE SUMMARY
Discharge Note      SUMMARY     Admit Date: 6/27/2017    Attending Physician: Carlos Messer MD     Discharge Physician: JANICE Trejo     Discharge Date:  6/29/17     Final Diagnosis: STEMI, CAD    Outcome of Stay:  Patient presented to the ED with chest pain and dyspnea. EKG showed evidence of anterior ST elevation. Patient taken emergently to the cath lab. Noted to have nonobstructive CAD and LVEF 55%. She was admitted to ICU for observation. She had no chest pain or angina. Her troponin has trended down. EKG improved. Labs stable. She has ambulated without angina symptoms. She has been counseled on post angiogram restrictions and verbalizes an understanding. She will follow up with Cardiology in about 2 weeks and Pulmonology as an OP.     Disposition: Home or Self Care    Patient Instructions:   Current Discharge Medication List      START taking these medications    Details   albuterol 90 mcg/actuation inhaler Inhale 2 puffs into the lungs every 6 (six) hours as needed for Wheezing. Rescue  Qty: 1 Inhaler, Refills: 0      atorvastatin (LIPITOR) 80 MG tablet Take 1 tablet (80 mg total) by mouth once daily.  Qty: 30 tablet, Refills: 11      clopidogrel (PLAVIX) 75 mg tablet Take 1 tablet (75 mg total) by mouth once daily.  Qty: 30 tablet, Refills: 11      isosorbide mononitrate (IMDUR) 30 MG 24 hr tablet Take 1 tablet (30 mg total) by mouth once daily.  Qty: 30 tablet, Refills: 11      metoprolol tartrate (LOPRESSOR) 25 MG tablet Take 1 tablet (25 mg total) by mouth 2 (two) times daily.  Qty: 60 tablet, Refills: 11      ranolazine (RANEXA) 500 MG Tb12 Take 1 tablet (500 mg total) by mouth 2 (two) times daily.  Qty: 60 tablet, Refills: 11         CONTINUE these medications which have NOT CHANGED    Details   aspirin (ECOTRIN) 81 MG EC tablet Take 1 tablet by mouth Daily.      colchicine 0.6 mg tablet Take 1 pill daily prn  Qty: 30 tablet, Refills: 1      diclofenac sodium (VOLTAREN) 1 % Gel Apply 2 g  topically 2 (two) times daily.  Qty: 1 Tube, Refills: 0      fluticasone (FLONASE) 50 mcg/actuation nasal spray 2 sprays by Each Nare route daily as needed for Rhinitis.  Qty: 16 g, Refills: 5      furosemide (LASIX) 20 MG tablet Take 1 tablet by mouth Daily. prn  Qty: 30 tablet, Refills: 5      hydrocodone-acetaminophen 5-325mg (NORCO) 5-325 mg per tablet Take 1 tablet by mouth every 4 (four) hours as needed.  Qty: 10 tablet, Refills: 0      losartan (COZAAR) 100 MG tablet TAKE ONE TABLET BY MOUTH ONCE DAILY  Qty: 30 tablet, Refills: 5             Discharge Procedure Orders (must include Diet, Follow-up, Activity)    Discharge Procedure Orders (must include Diet, Follow-up, Activity)  Diet general   Order Specific Question Answer Comments   Additional restrictions: Cardiac (Low Na/Chol)      Call MD for:  temperature >100.4     Call MD for:  persistent nausea and vomiting or diarrhea     Call MD for:  severe uncontrolled pain     Call MD for:  redness, tenderness, or signs of infection (pain, swelling, redness, odor or green/yellow discharge around incision site)     Call MD for:  difficulty breathing or increased cough     Call MD for:  severe persistent headache     Call MD for:  worsening rash     Call MD for:  persistent dizziness, light-headedness, or visual disturbances     Call MD for:  increased confusion or weakness     Remove dressing in 24 hours     Activity as tolerated     Shower on day dressing removed (No bath)     Lifting restrictions   Order Comments: No lifting greater than 10 lbs x 1 week       Follow-up Information     Carlos Messer MD In 2 weeks.    Specialties:  Cardiology, INTERVENTIONAL CARDIOLOGY  Why:  Hospital follow up. Clinic will call to arrange appt   Contact information:  [address]  [place] [address] [phone]

## 2017-06-29 NOTE — PLAN OF CARE
Problem: Patient Care Overview  Goal: Plan of Care Review  Outcome: Ongoing (interventions implemented as appropriate)  Patient updated on POC, demonstrated teach back  Pain denied  Monitor sr  VSS   Patient turns self;no hematoma noted  Fall precautions in place,bed locked,lowest position;call bell within reach;skidproof socks  Bed alarm  Pt remained free from falls; no resp. Distress noted

## 2017-06-29 NOTE — PROGRESS NOTES
Pt dc'd in stable condition accompanied by nurse.  Pt belongings brought down by nurse and family member.  Pt iv dc'd and dressed appropriately.  Pt given dc'd instructions and patient family member understands the instructions and have no questions currently.  Pt monitor taken off and given to monitor tech.

## 2017-06-29 NOTE — PLAN OF CARE
Met with patient and patient's  in hospital room.  Patient reports that she is in need of no post-hospitalization HH services, HME, any other services from a case management perspective.      D/C PLAN:  Home with family support and with recommended outpatient follow-up       06/29/17 1343   Final Note   Assessment Type Final Discharge Note   Discharge Disposition Home   What phone number can be called within the next 1-3 days to see how you are doing after discharge? [phone]   Hospital Follow Up  Appt(s) scheduled? Yes   Referral to Outpatient Case Management complete? n/a   Referral to / orders for Home Health Complete? n/a   Any social issues identified prior to discharge? No   Did you assess the readiness or willingness of the family or caregiver to support self management of care? Yes   Right Care Referral Info   Post Acute Recommendation No Care

## 2017-06-29 NOTE — PLAN OF CARE
06/29/17 1347   Medicare Message   Important Message from Medicare regarding Discharge Appeal Rights Given to patient/caregiver;Explained to patient/caregiver;Signed/date by patient/caregiver   Date IMM was signed 06/29/17   Time IMM was signed 1000

## 2017-06-30 ENCOUNTER — PATIENT OUTREACH (OUTPATIENT)
Dept: ADMINISTRATIVE | Facility: CLINIC | Age: 82
End: 2017-06-30

## 2017-06-30 NOTE — PATIENT INSTRUCTIONS
Discharge Instructions for Cardiac Catheterization  Cardiac catheterization is a procedure to look for blocked areas in the blood vessels that send blood to the heart. A thin, flexible tube (catheter) is put in a blood vessel in your groin or arm. The healthcare provider injects contrast fluid into your blood, which then flows to your heart. X-rays pictures are taken of your heart. Your provider will review the results with you. Be sure to ask any questions you have before you leave. This sheet will help you take care of yourself at home.  Home care  · Only do light and easy activities for the next 2 to 3 days. Ask for help with chores and errands while you recover. Have someone drive you to your appointments.  · Don't lift anything heavy for a while. Your healthcare team will tell you when it's safe to lift again.  · Ask your healthcare team when you can expect to return to work. Unless your job involves lifting, you may be able to return to your normal activities within a couple of days.  · Take your medicines as directed. Don't skip doses.  · Drink 6 to 8 glasses of water a day. This is to help flush the contrast dye out of your body. Call your healthcare team if your urine has any change in color.  · Take your temperature each day for 7 days. If you feel cold and clammy or start sweating, take your temperature right away and call your healthcare team.  · Check your incisions every day for signs of infection. These include redness, swelling, and drainage. It is normal to have a small bruise or bump where the catheter was inserted. A bruise that is getting larger is not normal and should be reported to your healthcare team. If you see blood forming in the incision, call your healthcare team. Go to the emergency department if you have uncontrolled bleeding from the artery site. This is especially true if you take medicines that make it difficult for your blood to clot. Examples are aspirin, clopidogrel, and  warfarin.  · Eat a healthy diet. Make sure it is low in fat, salt, and cholesterol. Ask your healthcare team for diet information.  · Stop smoking. Enroll in a stop-smoking program or ask your healthcare team for help. Stop-smoking programs can be life saving.  · Exercise as your healthcare team tells you to. Your healthcare team may recommend you start a cardiac rehabilitation program. Cardiac rehab is an exercise program in which trained healthcare staff watch your progress and stress on your heart while you exercise. Ask your team how to enroll.  · Don't swim or take baths until your healthcare team says its OK. You can shower the day after the procedure. Keep the site clean and dry. This keeps the incision from getting wet and infected until the skin and artery can heal.  Follow-up care  · Make a follow-up appointment as advised by our staff. It's common to have a follow-up appointment 2 to 4 weeks after an angioplasty or coronary stent procedure.  · Make a yearly appointment, too. This is to make sure you are still doing well and not having any new symptoms.  · Don't wait for a follow-up appointment if your medicines aren't working or you are having heart-related symptoms.  When to seek medical care  Call your healthcare provider right away if you have any of the following:  · Chest pain  · Constant or increasing pain or numbness in your leg  · Fever of 100.4°F (38.0°C) or higher, or as directed by your healthcare provider  · Symptoms of infection. These include redness, swelling, drainage, or warmth at the incision site.  · Shortness of breath  · A leg that feels cold or appears blue  · Bleeding, bruising, or a lot of swelling where the catheter was inserted  · Blood in your urine  · Black or tarry stools  · Any unusual bleeding   Date Last Reviewed: 10/1/2016  © 5726-1107 [email or web address]. [address] [place] [address], [place] [address]. All rights reserved. This information is not intended as a  substitute for professional medical care. Always follow your healthcare professional's instructions.

## 2017-07-03 ENCOUNTER — HOSPITAL ENCOUNTER (EMERGENCY)
Facility: HOSPITAL | Age: 82
Discharge: HOME | End: 2017-07-03
Attending: EMERGENCY MEDICINE
Payer: MEDICARE

## 2017-07-03 VITALS
BODY MASS INDEX: 29.83 KG/M2 | SYSTOLIC BLOOD PRESSURE: 167 MMHG | OXYGEN SATURATION: 97 % | HEART RATE: 87 BPM | TEMPERATURE: 98 F | RESPIRATION RATE: 28 BRPM | HEIGHT: 61 IN | DIASTOLIC BLOOD PRESSURE: 83 MMHG | WEIGHT: 158 LBS

## 2017-07-03 DIAGNOSIS — I10 ESSENTIAL HYPERTENSION: Primary | ICD-10-CM

## 2017-07-03 DIAGNOSIS — R06.02 SOB (SHORTNESS OF BREATH): ICD-10-CM

## 2017-07-03 LAB
ALBUMIN SERPL BCP-MCNC: 3.5 G/DL
ALLENS TEST: ABNORMAL
ALP SERPL-CCNC: 125 U/L
ALT SERPL W/O P-5'-P-CCNC: 33 U/L
ANION GAP SERPL CALC-SCNC: 12 MMOL/L
AST SERPL-CCNC: 27 U/L
BASOPHILS # BLD AUTO: 0.02 K/UL
BASOPHILS NFR BLD: 0.3 %
BILIRUB SERPL-MCNC: 0.8 MG/DL
BNP SERPL-MCNC: 389 PG/ML
BUN SERPL-MCNC: 11 MG/DL
CALCIUM SERPL-MCNC: 9.4 MG/DL
CHLORIDE SERPL-SCNC: 106 MMOL/L
CO2 SERPL-SCNC: 25 MMOL/L
CREAT SERPL-MCNC: 1 MG/DL
DELSYS: ABNORMAL
DIFFERENTIAL METHOD: ABNORMAL
EOSINOPHIL # BLD AUTO: 1.1 K/UL
EOSINOPHIL NFR BLD: 13.5 %
ERYTHROCYTE [DISTWIDTH] IN BLOOD BY AUTOMATED COUNT: 14.7 %
EST. GFR  (AFRICAN AMERICAN): >60 ML/MIN/1.73 M^2
EST. GFR  (NON AFRICAN AMERICAN): 53 ML/MIN/1.73 M^2
FIO2: 21
GLUCOSE SERPL-MCNC: 93 MG/DL
HCO3 UR-SCNC: 26.1 MMOL/L (ref 24–28)
HCT VFR BLD AUTO: 39.1 %
HGB BLD-MCNC: 12.7 G/DL
LYMPHOCYTES # BLD AUTO: 2.3 K/UL
LYMPHOCYTES NFR BLD: 28.5 %
MCH RBC QN AUTO: 28.7 PG
MCHC RBC AUTO-ENTMCNC: 32.5 %
MCV RBC AUTO: 88 FL
MODE: ABNORMAL
MONOCYTES # BLD AUTO: 0.6 K/UL
MONOCYTES NFR BLD: 6.9 %
NEUTROPHILS # BLD AUTO: 4 K/UL
NEUTROPHILS NFR BLD: 51.4 %
PCO2 BLDA: 42.1 MMHG (ref 35–45)
PH SMN: 7.4 [PH] (ref 7.35–7.45)
PLATELET # BLD AUTO: 126 K/UL
PMV BLD AUTO: ABNORMAL FL
PO2 BLDA: 67 MMHG (ref 80–100)
POC BE: 1 MMOL/L
POC SATURATED O2: 93 % (ref 95–100)
POTASSIUM SERPL-SCNC: 3.9 MMOL/L
PROT SERPL-MCNC: 7.8 G/DL
RBC # BLD AUTO: 4.43 M/UL
SAMPLE: ABNORMAL
SITE: ABNORMAL
SODIUM SERPL-SCNC: 143 MMOL/L
TROPONIN I SERPL DL<=0.01 NG/ML-MCNC: 0.04 NG/ML
TROPONIN I SERPL DL<=0.01 NG/ML-MCNC: 0.06 NG/ML
WBC # BLD AUTO: 7.93 K/UL

## 2017-07-03 PROCEDURE — 25000003 PHARM REV CODE 250: Performed by: EMERGENCY MEDICINE

## 2017-07-03 PROCEDURE — 85025 COMPLETE CBC W/AUTO DIFF WBC: CPT

## 2017-07-03 PROCEDURE — 84484 ASSAY OF TROPONIN QUANT: CPT

## 2017-07-03 PROCEDURE — 93005 ELECTROCARDIOGRAM TRACING: CPT

## 2017-07-03 PROCEDURE — 96375 TX/PRO/DX INJ NEW DRUG ADDON: CPT

## 2017-07-03 PROCEDURE — 99284 EMERGENCY DEPT VISIT MOD MDM: CPT | Mod: 25

## 2017-07-03 PROCEDURE — 94640 AIRWAY INHALATION TREATMENT: CPT

## 2017-07-03 PROCEDURE — 96374 THER/PROPH/DIAG INJ IV PUSH: CPT

## 2017-07-03 PROCEDURE — 25000242 PHARM REV CODE 250 ALT 637 W/ HCPCS: Performed by: EMERGENCY MEDICINE

## 2017-07-03 PROCEDURE — 93010 ELECTROCARDIOGRAM REPORT: CPT | Mod: ,,, | Performed by: INTERNAL MEDICINE

## 2017-07-03 PROCEDURE — 83880 ASSAY OF NATRIURETIC PEPTIDE: CPT

## 2017-07-03 PROCEDURE — 63600175 PHARM REV CODE 636 W HCPCS: Performed by: EMERGENCY MEDICINE

## 2017-07-03 PROCEDURE — 80053 COMPREHEN METABOLIC PANEL: CPT

## 2017-07-03 RX ORDER — IPRATROPIUM BROMIDE AND ALBUTEROL SULFATE 2.5; .5 MG/3ML; MG/3ML
3 SOLUTION RESPIRATORY (INHALATION)
Status: COMPLETED | OUTPATIENT
Start: 2017-07-03 | End: 2017-07-03

## 2017-07-03 RX ORDER — FUROSEMIDE 10 MG/ML
20 INJECTION INTRAMUSCULAR; INTRAVENOUS
Status: COMPLETED | OUTPATIENT
Start: 2017-07-03 | End: 2017-07-03

## 2017-07-03 RX ORDER — METHYLPREDNISOLONE SOD SUCC 125 MG
125 VIAL (EA) INJECTION
Status: COMPLETED | OUTPATIENT
Start: 2017-07-03 | End: 2017-07-03

## 2017-07-03 RX ORDER — METOPROLOL TARTRATE 1 MG/ML
5 INJECTION, SOLUTION INTRAVENOUS
Status: COMPLETED | OUTPATIENT
Start: 2017-07-03 | End: 2017-07-03

## 2017-07-03 RX ORDER — LABETALOL HYDROCHLORIDE 5 MG/ML
20 INJECTION, SOLUTION INTRAVENOUS
Status: DISCONTINUED | OUTPATIENT
Start: 2017-07-03 | End: 2017-07-03

## 2017-07-03 RX ORDER — HYDRALAZINE HYDROCHLORIDE 20 MG/ML
10 INJECTION INTRAMUSCULAR; INTRAVENOUS
Status: COMPLETED | OUTPATIENT
Start: 2017-07-03 | End: 2017-07-03

## 2017-07-03 RX ADMIN — METOPROLOL TARTRATE 5 MG: 5 INJECTION INTRAVENOUS at 10:07

## 2017-07-03 RX ADMIN — IPRATROPIUM BROMIDE AND ALBUTEROL SULFATE 3 ML: .5; 3 SOLUTION RESPIRATORY (INHALATION) at 10:07

## 2017-07-03 RX ADMIN — HYDRALAZINE HYDROCHLORIDE 10 MG: 20 INJECTION INTRAMUSCULAR; INTRAVENOUS at 06:07

## 2017-07-03 RX ADMIN — METHYLPREDNISOLONE SODIUM SUCCINATE 125 MG: 125 INJECTION, POWDER, FOR SOLUTION INTRAMUSCULAR; INTRAVENOUS at 05:07

## 2017-07-03 RX ADMIN — IPRATROPIUM BROMIDE AND ALBUTEROL SULFATE 3 ML: .5; 3 SOLUTION RESPIRATORY (INHALATION) at 05:07

## 2017-07-03 RX ADMIN — FUROSEMIDE 20 MG: 10 INJECTION, SOLUTION INTRAMUSCULAR; INTRAVENOUS at 10:07

## 2017-07-03 RX ADMIN — NITROGLYCERIN 0.5 INCH: 20 OINTMENT TOPICAL at 10:07

## 2017-07-03 RX ADMIN — NITROGLYCERIN 0.5 INCH: 20 OINTMENT TOPICAL at 05:07

## 2017-07-03 NOTE — ED PROVIDER NOTES
SCRIBE #1 NOTE: I, Yessi Luis, am scribing for, and in the presence of, Vijay Marinelli MD. I have scribed the HPI, ROS, and PEx.     SCRIBE #2 NOTE: I, Gregg Enriquez, am scribing for, and in the presence of,  Walter Imnan MD. I have scribed the remaining portions of the note not scribed by Scribe #1.     History      Chief Complaint   Patient presents with    Shortness of Breath     pt states she can't breath that she has congestion in her lungs       Review of patient's allergies indicates:   Allergen Reactions    Codeine      Other reaction(s): Unknown    Ibuprofen      Other reaction(s): Vomiting  Other reaction(s): Itching    Zetia  [ezetimibe]      Other reaction(s): Vomiting        HPI   HPI    7/3/2017, 4:46 PM   History obtained from the patient and spouse      History of Present Illness: Charline Messer is a 82 y.o. female patient who presents to the Emergency Department for SOB which onset suddenly PTA. Symptoms are constant and moderate in severity. Laying down flat and movement exacerbate sxs, no mitigating factors reported. Associated sxs include wheezing. Patient denies any recent travel/long car rides, CP, BLE edema/pain, cough, n/v, and all other sxs at this time. Prior Tx includes an inhaler with no relief. Pt was admitted to the ICU last week for an AMI. No further complaints or concerns at this time.       Arrival mode: Personal vehicle      PCP: Za Rey MD       Past Medical History:  Past Medical History:   Diagnosis Date    Allergic rhinitis, cause unspecified     Arthritis     Asthma     as a child    Benign colonic polyp     Breast cancer mid 1980s    right; per patient s/p right mastectomy and chemo, unsure about radiation    Depression     Diverticular disease     External hemorrhoids     Gout attack     Hyperlipidemia     Hypertension     Hypothyroidism     Obesity (BMI 30-39.9) 10/24/2016    Osteoarthritis     Peripheral vascular disease      Postmenopausal     Pulmonary embolism     reported per patient; unsure date    ST elevation myocardial infarction (STEMI) of anterior wall 6/27/2017    Thrombocytopenia     Tinnitus aurium     Vitamin D deficiency disease        Past Surgical History:  Past Surgical History:   Procedure Laterality Date    PARTIAL HYSTERECTOMY      Due to fibroids    Right mastectomy           Family History:  Family History   Problem Relation Age of Onset    Heart disease Mother     Hypertension Mother     Stroke Mother     Hypertension Father     Heart disease Sister     No Known Problems Son     Cancer Neg Hx     Diabetes Neg Hx     Kidney disease Neg Hx        Social History:  Social History     Social History Main Topics    Smoking status: Never Smoker    Smokeless tobacco: Never Used    Alcohol use 2.4 oz/week     4 Cans of beer per week    Drug use: No    Sexual activity: No       ROS   Review of Systems   Constitutional: Negative for fever.   HENT: Negative for sore throat.    Respiratory: Positive for shortness of breath and wheezing. Negative for cough.    Cardiovascular: Negative for chest pain and leg swelling.   Gastrointestinal: Negative for nausea and vomiting.   Genitourinary: Negative for dysuria.   Musculoskeletal: Negative for back pain.   Skin: Negative for rash.   Neurological: Negative for weakness.   Hematological: Does not bruise/bleed easily.   All other systems reviewed and are negative.      Physical Exam      Initial Vitals [07/03/17 1628]   BP Pulse Resp Temp SpO2   (!) 202/100 79 (!) 22 97.4 °F (36.3 °C) (!) 91 %      MAP       134          Physical Exam  Nursing Notes and Vital Signs Reviewed.  Constitutional: Patient is in no acute distress. Well-developed and well-nourished.  Head: Atraumatic. Normocephalic.  Eyes: PERRL. EOM intact. Conjunctivae are not pale. No scleral icterus.  ENT: Mucous membranes are moist. Oropharynx is clear and symmetric.    Neck: Supple. Full ROM. No  "lymphadenopathy.  Cardiovascular: Regular rate. Regular rhythm. No murmurs, rubs, or gallops. Distal pulses are 2+ and symmetric.  Pulmonary/Chest: No respiratory distress. Wheezing bilaterally. No rales or rhonchi.  Abdominal: Soft and non-distended.  There is no tenderness.  No rebound, guarding, or rigidity.   Musculoskeletal: Moves all extremities. No obvious deformities. No edema. No calf tenderness.  Skin: Warm and dry.  Neurological:  Alert, awake, and appropriate.  Normal speech.  No acute focal neurological deficits are appreciated.  Psychiatric: Normal affect. Good eye contact. Appropriate in content.    ED Course    Procedures  ED Vital Signs:  Vitals:    07/03/17 1628 07/03/17 1700 07/03/17 1703 07/03/17 1748   BP: (!) 202/100  (!) 193/90 (!) 206/89   Pulse: 79 74 79 83   Resp: (!) 22 20 (!) 27 (!) 22   Temp: 97.4 °F (36.3 °C)      TempSrc: Oral      SpO2: (!) 91% (!) 93% (!) 93% 95%   Weight: 71.7 kg (158 lb)      Height: 5' 1" (1.549 m)       07/03/17 1903 07/03/17 2003 07/03/17 2100 07/03/17 2207   BP: (!) 186/81 (!) 189/78 (!) 181/86 (!) 182/84   Pulse: 84 90 90 87   Resp: (!) 30 (!) 28 (!) 28 (!) 26   Temp:  97.9 °F (36.6 °C) 97.8 °F (36.6 °C) 97.9 °F (36.6 °C)   TempSrc:  Oral Oral Oral   SpO2: 95% 96% 96% 96%   Weight:       Height:        07/03/17 2211 07/03/17 2324 07/03/17 2333 07/03/17 2334   BP:  (!) 179/84 (!) 167/83    Pulse: 88 90 87    Resp: 20 (!) 24 (!) 28    Temp:  97.8 °F (36.6 °C)     TempSrc:  Oral     SpO2: 95% 96%  97%   Weight:       Height:           Abnormal Lab Results:  Labs Reviewed   CBC W/ AUTO DIFFERENTIAL - Abnormal; Notable for the following:        Result Value    RDW 14.7 (*)     Platelets 126 (*)     Eos # 1.1 (*)     Eosinophil% 13.5 (*)     All other components within normal limits   COMPREHENSIVE METABOLIC PANEL - Abnormal; Notable for the following:     eGFR if non  53 (*)     All other components within normal limits   TROPONIN I - Abnormal; " Notable for the following:     Troponin I 0.045 (*)     All other components within normal limits   TROPONIN I - Abnormal; Notable for the following:     Troponin I 0.056 (*)     All other components within normal limits   B-TYPE NATRIURETIC PEPTIDE - Abnormal; Notable for the following:      (*)     All other components within normal limits   ISTAT PROCEDURE - Abnormal; Notable for the following:     POC PO2 67 (*)     POC SATURATED O2 93 (*)     All other components within normal limits        All Lab Results:  Results for orders placed or performed during the hospital encounter of 07/03/17   CBC auto differential   Result Value Ref Range    WBC 7.93 3.90 - 12.70 K/uL    RBC 4.43 4.00 - 5.40 M/uL    Hemoglobin 12.7 12.0 - 16.0 g/dL    Hematocrit 39.1 37.0 - 48.5 %    MCV 88 82 - 98 fL    MCH 28.7 27.0 - 31.0 pg    MCHC 32.5 32.0 - 36.0 %    RDW 14.7 (H) 11.5 - 14.5 %    Platelets 126 (L) 150 - 350 K/uL    MPV SEE COMMENT 9.2 - 12.9 fL    Gran # 4.0 1.8 - 7.7 K/uL    Lymph # 2.3 1.0 - 4.8 K/uL    Mono # 0.6 0.3 - 1.0 K/uL    Eos # 1.1 (H) 0.0 - 0.5 K/uL    Baso # 0.02 0.00 - 0.20 K/uL    Gran% 51.4 38.0 - 73.0 %    Lymph% 28.5 18.0 - 48.0 %    Mono% 6.9 4.0 - 15.0 %    Eosinophil% 13.5 (H) 0.0 - 8.0 %    Basophil% 0.3 0.0 - 1.9 %    Differential Method Automated    Comprehensive metabolic panel   Result Value Ref Range    Sodium 143 136 - 145 mmol/L    Potassium 3.9 3.5 - 5.1 mmol/L    Chloride 106 95 - 110 mmol/L    CO2 25 23 - 29 mmol/L    Glucose 93 70 - 110 mg/dL    BUN, Bld 11 8 - 23 mg/dL    Creatinine 1.0 0.5 - 1.4 mg/dL    Calcium 9.4 8.7 - 10.5 mg/dL    Total Protein 7.8 6.0 - 8.4 g/dL    Albumin 3.5 3.5 - 5.2 g/dL    Total Bilirubin 0.8 0.1 - 1.0 mg/dL    Alkaline Phosphatase 125 55 - 135 U/L    AST 27 10 - 40 U/L    ALT 33 10 - 44 U/L    Anion Gap 12 8 - 16 mmol/L    eGFR if African American >60 >60 mL/min/1.73 m^2    eGFR if non African American 53 (A) >60 mL/min/1.73 m^2   Troponin I #1   Result  Value Ref Range    Troponin I 0.045 (H) 0.000 - 0.026 ng/mL   Troponin I #2   Result Value Ref Range    Troponin I 0.056 (H) 0.000 - 0.026 ng/mL   B-Type natriuretic peptide (BNP)   Result Value Ref Range     (H) 0 - 99 pg/mL   ISTAT PROCEDURE   Result Value Ref Range    POC PH 7.399 7.35 - 7.45    POC PCO2 42.1 35 - 45 mmHg    POC PO2 67 (L) 80 - 100 mmHg    POC HCO3 26.1 24 - 28 mmol/L    POC BE 1 -2 to 2 mmol/L    POC SATURATED O2 93 (L) 95 - 100 %    Sample ARTERIAL     Site LR     Allens Test Pass     DelSys Room Air     Mode SPONT     FiO2 21          Imaging Results:  Imaging Results          X-Ray Chest 1 View (Final result)  Result time 07/03/17 17:19:10    Final result by Alex Archer MD (07/03/17 17:19:10)                 Impression:     Elevation right hemidiaphragm.  Lung fields are clear.      Electronically signed by: ALEX ARCHER MD  Date:     07/03/17  Time:    17:19              Narrative:    Single view chest    Clinical indication: Shortness of breath    Findings: There is moderate elevation of the right hemidiaphragm.  Lungs are clear and free of infiltrates.  The heart is normal in size.  There surgical clips in the right axilla.                             The EKG was ordered, reviewed, and independently interpreted by the ED provider.  Interpretation time: 16:17  Rate: 83 BPM  Rhythm: normal sinus rhythm  Interpretation: Possible left atrial enlargement. No STEMI.           The Emergency Provider reviewed the vital signs and test results, which are outlined above.    ED Discussion     7:37 PM: Re-evaluated pt. Pt is resting comfortably and is in no acute distress. Pt states that she wants to try to go home. Informed the pt that we will need to do a repeat Troponin before she leaves. D/w pt all pertinent results. D/w pt any concerns expressed at this time. Answered all questions. Pt expresses understanding at this time.    7:53 PM: Dr. Marinelli transfers care of pt to   [facility], pending lab results.      9:55 PM: Dr. Inman discussed the pt's case with Dr. Lobo (Cardiology) who recommends   Give pt a breathing treatment and have pt f/u with cardiology in x1 week    10:30 PM: Has not taken her evening BP medications,will give meds here and if BP improves pt is ok to d/c. Pt states her sx have improved and after being given medications she feels safe going home.     11:43 PM: Reassessed pt at this time.  Pt states her condition has improved at this time. Discussed with pt all pertinent ED information and results. Discussed pt dx and plan of tx. Gave pt all f/u and return to the ED instructions. All questions and concerns were addressed at this time. Pt expresses understanding of information and instructions, and is comfortable with plan to discharge. Pt is stable for discharge.    I discussed with patient and/or family/caretaker that evaluation in the ED does not suggest any emergent or life threatening medical conditions requiring immediate intervention beyond what was provided in the ED, and I believe patient is safe for discharge.  Regardless, an unremarkable evaluation in the ED does not preclude the development or presence of a serious of life threatening condition. As such, patient was instructed to return immediately for any worsening or change in current symptoms.    Regarding HYPERTENSION, I advised patient to: keep a record of blood pressure results; take your blood pressure medication exactly as directed without skipping doses; avoid medications that contain heart stimulants, including over-the-counter drugs such as decongestants; maintain a healthy weight; cut back on sodium intake (i.e., limit canned, dried, packaged, and fast foods and dont add salt to food); follow the DASH (Dietary Approaches to Stop Hypertension) eating plan which recommends vegetables, fruits, whole gains, and other heart healthy foods; begin an exercise program that includes  aerobic  exercise 3 to 4 times a week for an average of 40 minutes at a time (with approval of cardiologist or primary care provider); limit drinks that contain alcohol and caffeine; control levels of emotional stress; and seek emergency care for any shortness of breath, chest pain, difficulty speaking, confusion, or visual changes.        ED Medication(s):  Medications   methylPREDNISolone sodium succinate injection 125 mg (125 mg Intravenous Given 7/3/17 1730)   albuterol-ipratropium 2.5mg-0.5mg/3mL nebulizer solution 3 mL (3 mLs Nebulization Given 7/3/17 1700)   hydrALAZINE injection 10 mg (10 mg Intravenous Given 7/3/17 1855)   albuterol-ipratropium 2.5mg-0.5mg/3mL nebulizer solution 3 mL (3 mLs Nebulization Given 7/3/17 2211)   metoprolol injection 5 mg (5 mg Intravenous Given 7/3/17 2244)   furosemide injection 20 mg (20 mg Intravenous Given 7/3/17 2245)       Discharge Medication List as of 7/3/2017 11:42 PM          Follow-up Information     Za Rey MD. Schedule an appointment as soon as possible for a visit in 1 week.    Specialty:  Family Medicine  Contact information:  [address] [facility] [address] [phone]             [facility]    Specialty:  Emergency Medicine  Why:  As needed, If symptoms worsen  Contact information:  [address] [facility] [address] [phone]                   Medical Decision Making    Medical Decision Making:   Clinical Tests:   Lab Tests: Ordered and Reviewed  Radiological Study: Ordered and Reviewed  Medical Tests: Ordered and Reviewed           Scribe Attestation:   Scribe #1: I performed the above scribed service and the documentation accurately describes the services I performed. I attest to the accuracy of the note.    Attending:   Physician Attestation Statement for Scribe #1: I, Vijay Marinelli MD, personally performed the services described in this documentation, as scribed by Yessi Luis, in my  presence, and it is both accurate and complete.       Scribe Attestation:   Scribe #2: I performed the above scribed service and the documentation accurately describes the services I performed. I attest to the accuracy of the note.    Attending Attestation:           Physician Attestation for Scribe:    Physician Attestation Statement for Scribe #2: I, Walter Inman MD, reviewed documentation, as scribed by Gregg Enriquez in my presence, and it is both accurate and complete. I also acknowledge and confirm the content of the note done by Scribe #1.          Clinical Impression       ICD-10-CM ICD-9-CM   1. Essential hypertension I10 401.9   2. SOB (shortness of breath) R06.02 786.05       Disposition:   Disposition: Discharged  Condition: Stable         Walter Inman Jr., MD  07/14/17 0137

## 2017-07-04 NOTE — DISCHARGE INSTRUCTIONS
Regarding HYPERTENSION, I advised patient to: keep a record of blood pressure results; take your blood pressure medication exactly as directed without skipping doses; avoid medications that contain heart stimulants, including over-the-counter drugs such as decongestants; maintain a healthy weight; cut back on sodium intake (i.e., limit canned, dried, packaged, and fast foods and don?t add salt to food); follow the DASH (Dietary Approaches to Stop Hypertension) eating plan which recommends vegetables, fruits, whole gains, and other heart healthy foods; begin an exercise program that includes  aerobic exercise 3 to 4 times a week for an average of 40 minutes at a time (with approval of cardiologist or primary care provider); limit drinks that contain alcohol and caffeine; control levels of emotional stress; and seek emergency care for any shortness of breath, chest pain, difficulty speaking, confusion, or visual changes.

## 2017-07-04 NOTE — ED NOTES
Pt resting in ER stretcher, aaox4, rr e/u, NAD noted. Pt remains on cardiac monitor with vss noted. Bed low and locked, call light in reach, side rails up x2.   at bedside. Pt verbalized understanding of status and POC; denies further needs. Will continue to monitor.

## 2017-07-06 ENCOUNTER — PATIENT OUTREACH (OUTPATIENT)
Dept: ADMINISTRATIVE | Facility: HOSPITAL | Age: 82
End: 2017-07-06

## 2017-07-06 DIAGNOSIS — E55.9 VITAMIN D INSUFFICIENCY: ICD-10-CM

## 2017-07-06 DIAGNOSIS — I65.23 ATHEROSCLEROSIS OF BOTH CAROTID ARTERIES: ICD-10-CM

## 2017-07-06 DIAGNOSIS — I73.9 PAD (PERIPHERAL ARTERY DISEASE): Chronic | ICD-10-CM

## 2017-07-06 DIAGNOSIS — I21.09: ICD-10-CM

## 2017-07-06 DIAGNOSIS — D69.6 THROMBOCYTOPENIA: ICD-10-CM

## 2017-07-06 DIAGNOSIS — E66.9 OBESITY (BMI 30-39.9): ICD-10-CM

## 2017-07-06 DIAGNOSIS — M15.9 GENERALIZED OSTEOARTHROSIS, INVOLVING MULTIPLE SITES: ICD-10-CM

## 2017-07-06 DIAGNOSIS — E03.9 HYPOTHYROIDISM, UNSPECIFIED TYPE: Chronic | ICD-10-CM

## 2017-07-06 DIAGNOSIS — E78.5 HYPERLIPIDEMIA, UNSPECIFIED HYPERLIPIDEMIA TYPE: ICD-10-CM

## 2017-07-06 DIAGNOSIS — I10 ESSENTIAL HYPERTENSION: Chronic | ICD-10-CM

## 2017-07-06 DIAGNOSIS — Z85.3 HISTORY OF BREAST CANCER: ICD-10-CM

## 2017-07-06 DIAGNOSIS — I21.09 ST ELEVATION MYOCARDIAL INFARCTION (STEMI) OF ANTERIOR WALL: Primary | ICD-10-CM

## 2017-07-07 ENCOUNTER — OUTPATIENT CASE MANAGEMENT (OUTPATIENT)
Dept: ADMINISTRATIVE | Facility: OTHER | Age: 82
End: 2017-07-07

## 2017-07-07 NOTE — PROGRESS NOTES
Thank you for the referral.   For your information:    The following patient has been assigned to Sravani Oconnell RN with [facility] for high risk screening.    Reason:   ST elevation myocardial infarction (STEMI) of anterior wall  Essential hypertension  PAD (peripheral artery disease)  Atherosclerosis of both carotid arteries  AMI anterior wall  Hypothyroidism, unspecified type  Hyperlipidemia, unspecified hyperlipidemia type  Vitamin D insufficiency  Obesity (BMI 30-39.9)  Generalized osteoarthrosis, involving multiple sites  History of breast cancer  Thrombocytopenia    Please contact [facility] at [phone] with any questions.    Thank you,  Shaila Jones

## 2017-07-11 ENCOUNTER — OFFICE VISIT (OUTPATIENT)
Dept: FAMILY MEDICINE | Facility: CLINIC | Age: 82
End: 2017-07-11
Payer: MEDICARE

## 2017-07-11 VITALS
SYSTOLIC BLOOD PRESSURE: 152 MMHG | WEIGHT: 145.94 LBS | BODY MASS INDEX: 27.55 KG/M2 | HEIGHT: 61 IN | HEART RATE: 87 BPM | TEMPERATURE: 97 F | OXYGEN SATURATION: 95 % | DIASTOLIC BLOOD PRESSURE: 80 MMHG | RESPIRATION RATE: 18 BRPM

## 2017-07-11 DIAGNOSIS — I10 ESSENTIAL HYPERTENSION: ICD-10-CM

## 2017-07-11 DIAGNOSIS — Z78.0 POSTMENOPAUSAL: ICD-10-CM

## 2017-07-11 DIAGNOSIS — Z12.31 OTHER SCREENING MAMMOGRAM: Primary | ICD-10-CM

## 2017-07-11 DIAGNOSIS — M79.605 LEFT LEG PAIN: ICD-10-CM

## 2017-07-11 DIAGNOSIS — I25.118 CORONARY ARTERY DISEASE OF NATIVE HEART WITH STABLE ANGINA PECTORIS, UNSPECIFIED VESSEL OR LESION TYPE: ICD-10-CM

## 2017-07-11 DIAGNOSIS — Z13.820 OSTEOPOROSIS SCREENING: ICD-10-CM

## 2017-07-11 DIAGNOSIS — I21.3 ST ELEVATION MYOCARDIAL INFARCTION (STEMI), UNSPECIFIED ARTERY: Primary | ICD-10-CM

## 2017-07-11 PROCEDURE — 99499 UNLISTED E&M SERVICE: CPT | Mod: S$GLB,,, | Performed by: FAMILY MEDICINE

## 2017-07-11 PROCEDURE — 99999 PR PBB SHADOW E&M-EST. PATIENT-LVL III: CPT | Mod: PBBFAC,,, | Performed by: FAMILY MEDICINE

## 2017-07-11 PROCEDURE — 99495 TRANSJ CARE MGMT MOD F2F 14D: CPT | Mod: S$GLB,,, | Performed by: FAMILY MEDICINE

## 2017-07-11 RX ORDER — HYDROCODONE BITARTRATE AND ACETAMINOPHEN 5; 325 MG/1; MG/1
1 TABLET ORAL DAILY PRN
Qty: 30 TABLET | Refills: 0 | Status: SHIPPED | OUTPATIENT
Start: 2017-07-11 | End: 2017-10-13 | Stop reason: SDUPTHER

## 2017-07-11 NOTE — PROGRESS NOTES
Transitional Care Note  Subjective:       Patient ID: Charline Messer is a 82 y.o. female.  Chief Complaint: Hospital Follow Up    Family and/or Caretaker present at visit?  No.  Diagnostic tests reviewed/disposition: No diagnosic tests pending after this hospitalization.  Disease/illness education: Reviewed with patient.  Home health/community services discussion/referrals: Patient does not have home health established from hospital visit.  They do not need home health.  If needed, we will set up home health for the patient.   Establishment or re-establishment of referral orders for community resources: No other necessary community resources.   Discussion with other health care providers: No discussion with other health care providers necessary.   Ms. Messer comes in today for hospital follow-up.  She [place] [facility] [place] through June 29, 2017 for STEMI, CAD as she presented to the [facility] [place] with chest pain and dyspnea and EKG showed evidence of anterior ST elevation. Cardiac cath was performed and showed nonobstructive CAD and LVEF 55%. She was admitted to ICU for observation. She had no chest pain or angina. Her troponin has trended down. EKG improved. Labs were stable. She ambulated without angina symptoms. She was advised to follow up with Cardiology in about 2 weeks and Pulmonology as an OP.  However, on July 3, 2017 evaluated at [facility] [place] for shortness of breath, hypertension with observation and unremarkable workup and advised to follow-up with me in one week.    She states she is not fasting today but has taken medication today.  She reports having tingling sensation in her feet.  She also reports having left leg pain for 2 months and states she was hospitalized she was given prescription for Voltaren gel to apply 4 times daily.  She requests refill of [place].    She is scheduled to see pulmonologist Dr. Lerma on June 26, 2017 for hospital follow-up.  She is also scheduled to see YESIKA Amin  Ernie with cardiology on August 2, 2017 for hospital follow-up.    She states she has return to work.    She states she continues to have slight wheeze but uses MDI with help.  She also reports having slight headache today.  Otherwise, she denies having change of appetite, fever, chills, fatigue, unusual urinary symptoms, chest pain, palpitations, leg swelling, shortness of breath, cough, abdominal pain, nausea, vomiting, diarrhea, constipation, back pain, anxiety or depression.    Current Outpatient Prescriptions:  albuterol 90 mcg/actuation inhaler, Inhale 2 puffs into the lungs every 6 (six) hours as needed for Wheezing. Rescue  aspirin (ECOTRIN) 81 MG EC tablet, Take 1 tablet by mouth Daily.  atorvastatin (LIPITOR) 80 MG tablet, Take 1 tablet (80 mg total) by mouth once daily.  clopidogrel (PLAVIX) 75 mg tablet, Take 1 tablet (75 mg total) by mouth once daily.  colchicine 0.6 mg tablet, Take 1 pill daily prn  diclofenac sodium (VOLTAREN) 1 % Gel, Apply 2 g topically 2 (two) times daily.  fluticasone (FLONASE) 50 mcg/actuation nasal spray, 2 sprays by Each Nare route daily as needed for Rhinitis.  furosemide (LASIX) 20 MG tablet, Take 1 tablet by mouth Daily. prn  hydrocodone-acetaminophen 5-325mg (NORCO) 5-325 mg per tablet, Take 1 tablet by mouth every 4 (four) hours as needed.  isosorbide mononitrate (IMDUR) 30 MG 24 hr tablet, Take 1 tablet (30 mg total) by mouth once daily.  metoprolol tartrate (LOPRESSOR) 25 MG tablet, Take 1 tablet (25 mg total) by mouth 2 (two) times daily.  ranolazine (RANEXA) 500 MG Tb12, Take 1 tablet (500 mg total) by mouth 2 (two) times daily.              Review of Systems   Constitutional: Negative for activity change, appetite change, chills, fatigue and fever.   Respiratory: Positive for wheezing. Negative for cough and shortness of breath.         See history of present illness.   Cardiovascular: Negative for chest pain, palpitations and leg swelling.        See history of  present illness.   Gastrointestinal: Negative for abdominal pain, constipation, diarrhea, nausea and vomiting.   Genitourinary: Negative for difficulty urinating.   Musculoskeletal: Negative for back pain.        Positive for left leg pain.   Neurological: Positive for headaches.        Positive for tingling.   Psychiatric/Behavioral: Negative for dysphoric mood. The patient is not nervous/anxious.        Objective:      Physical Exam   Constitutional: She is oriented to person, place, and time. She appears well-developed and well-nourished. No distress.   Elderly and pleasant.   Eyes:   She wears glasses.   Neck: Normal range of motion. Neck supple. No thyromegaly present.   Cardiovascular: Normal rate and regular rhythm.    No murmur heard.  Chronic, slightly decreased pedal pulse at right.   Pulmonary/Chest: Effort normal and breath sounds normal. No respiratory distress. She has no wheezes.   Abdominal: Soft. Bowel sounds are normal. She exhibits no distension and no mass. There is no tenderness. There is no rebound and no guarding.   Musculoskeletal: Normal range of motion. She exhibits tenderness. She exhibits no edema.   She is ambulatory without problems. Tender at left lateral thigh with full range of motion noted.  Non tender back.   Lymphadenopathy:     She has no cervical adenopathy.   Neurological: She is alert and oriented to person, place, and time. She has normal reflexes.   Skin: She is not diaphoretic.   Psychiatric: She has a normal mood and affect. Her behavior is normal. Judgment and thought content normal.   Vitals reviewed.      Assessment:       1. ST elevation myocardial infarction (STEMI), unspecified artery    2. Coronary artery disease of native heart with stable angina pectoris, unspecified vessel or lesion type    3. Essential hypertension    4. Left leg pain        Plan:       1.  No labs today.  2.  Continue current medications, follow low sodium, low cholesterol, low carb diet, daily  walks.  3.  Keep follow up with specialists.  4.  Prescription refill - Norco 5-325 mg daily prn pain, #30, 0 refill.  5.  If not improvement upon return visit with me, will need back imaging.  6.  Flu shot this fall.  7.  See me for physical with mammogram this summer.

## 2017-07-21 ENCOUNTER — OUTPATIENT CASE MANAGEMENT (OUTPATIENT)
Dept: ADMINISTRATIVE | Facility: OTHER | Age: 82
End: 2017-07-21

## 2017-07-21 NOTE — PROGRESS NOTES
7/21/17 - Attempted phone contact with pt on home number with no answer.  answered mobile number and advised pt is currently sleeping and requested a call back at a later time. CM will attempt to contact pt later per request. Sravani Oconnell RN

## 2017-07-25 ENCOUNTER — OUTPATIENT CASE MANAGEMENT (OUTPATIENT)
Dept: ADMINISTRATIVE | Facility: OTHER | Age: 82
End: 2017-07-25

## 2017-07-25 ENCOUNTER — TELEPHONE (OUTPATIENT)
Dept: PHARMACY | Facility: CLINIC | Age: 82
End: 2017-07-25

## 2017-07-25 NOTE — PROGRESS NOTES
"7/25/17 - Phone contact with pt today for completion of Initial Screen for OPCM. She reports pain to her L leg from hip down to foot. It is 9/10 currently and does not get better than that. It does get to 10/10 at times. She does not want CM to message Dr Rey, her PCP, about it, initially stating she is out on vacation, then later states, "No, she knows about it". She also reports she has difficulty with paying copays for meds and medical treatment, relaying that she has an outstanding bill with [facility] right now. She has applied for assistance with Patient Financial Services in the past, but her  was working at the time. He is now retired and their income has changed. CM encouraged her to go to the Financial Services office at the [facility] location and tell them she needs to re-apply when she goes there to see her Pulm provider tomorrow. Advised CM will refer her to the Patient Pharmacy Assistance Program to see if the med she has a hard time affording is available for assistance. Advised CM will contact her next week for completion of Nursing Assessment for OPCM. She advised Monday is a good day for her. Sravani Oconnell RN  "

## 2017-07-25 NOTE — TELEPHONE ENCOUNTER
Spoke with patient about referral for medication.  Patient stated she didn't need assistance for medication she needed assistance for her medical bills that will be coming.  I told her I do not assist with that and I will put that information in her chart.

## 2017-07-29 ENCOUNTER — OUTPATIENT CASE MANAGEMENT (OUTPATIENT)
Dept: ADMINISTRATIVE | Facility: OTHER | Age: 82
End: 2017-07-29

## 2017-07-29 NOTE — LETTER
July 29, 2017    Charline Messer  [address]  [place] [address]             Outpatient Case Management  [address]  [facility] [address] Dear Charline Messer:    We understand that receiving many services from different doctors and healthcare providers is overwhelming. There are appointments to make, transportation to arrange, dietary instructions to understand, and new medications to obtain.    This is where [facility] can help.     You are eligible to receive Outpatient Case Management services when you have healthcare needs that require the coordination of many providers, treatments, and services. You also qualify if you need assistance with a new treatment plan.     There is no charge for this support. You may have been referred to this program from your doctor(s), hospital staff member(s), or insurance company but you always have a choice to participate or not participate. To participate, you must give us your permission to be enrolled.     When you are enrolled in the [facility] Case [facility] program, the  who is assigned to you is    Sravani Oconnell RN  [phone]    Depending on your needs and wishes, your  may speak with you by phone, visit you at your place of living (for example your home, skilled nursing facility, or rehabilitation facility), or meet you at your doctors office.     Your  will tell you why you have been selected to participate in the program and will complete an assessment of your needs. Then a personalized plan of care will be developed with you and or your caregiver.             Here are examples of the services your [facility]  provides.     Coordinate communication among multiple providers.   Arrange for transportation, doctors visits, durable medical equipment, home care services, and special clinics.    Provide coaching on how to manage your health condition.    Answer  questions about your health condition.   Help you understand your doctors treatment plan.    Provide additional instruction about your health condition, treatments, and medications.    Help you obtain information about your insurance coverage.    Advocate for your individual needs.    Request a Licensed Clinical  (LCSW) to visit you if you need their services. LCSWs help with long term planning (discussing placement options, advanced planning directives), financial planning, and assistance (for example rent, utilities, medication funding).     Your  will coordinate their activities with other outpatient services you are receiving. All services provided by [facility]  are coordinated with and communicated to your primary care physician.    Our goal is to help you manage your health condition(s) safely within your living environment, whether that is your home or a medical facility. We want to help you function at the healthiest and highest level possible.     Sincerely,      Jaime Dalal MD  Medical Director    Enclosures:    Frequently Asked Questions  Patient Rights and Responsibilities   Reporting a Grievance or Complaint  Consent/Release of Information  Stamped Addressed Envelope                  Frequently Asked Questions about [facility]    What is [facility]?  Outpatient Case management is not Home healthcare services. [facility]  do not provide hands-on care. [facility]  will work with your doctor to arrange for home health services, if needed. Home health services have a limited duration and there are some restrictions as to who can get these services. There is no prescribed limit to the amount of time you receive [facility] services. [facility]  are not agents of your insurance company. However, [facility] Case  Managers can help you obtain information from your insurance company.     Who are the [facility] ?  [facility]  are Registered Nurses and Social Workers. It is important to remember that you and your  are a team that works together with your primary care physician to create your individualized plan of care. The ultimate goal of your care plan is to help you implement your doctors treatment plan and to help you function at the highest level of health possible.     What are my rights as a patient?  It is important for you to know and understand your rights and responsibilities while receiving services from the [facility] program. We have enclosed a complete description of your rights and responsibilities. You can help to make your care more effective when you understand your right and responsibilities.     What is needed to be enrolled in the program?  You are only enrolled in the [facility] Program when you give us your consent to participate. You will find enclosed a consent form. You are receiving this letter because you or your caregivers have given us a verbal consent to enroll you in [facility]s Outpatient Case Management Program. We ask that you sign and return the enclosed written consent in the stamped self-addressed envelope.                           Patient Rights and Responsibilities    We consider you a partner in your care. When you are well informed, participate in treatment decisions and communicate openly with your doctor and other healthcare professionals, you help make your care more effective.     While you are in the Outpatient Case Management Program, your rights include the following:     You have a right to be provided services in a non-discriminatory manner in accordance with the provisions of Title VI of the Civil Rights Act of 1964, Section 504 of the Rehabilitation Act of 1973, the Age  Discrimination Act of 1975, the Americans with Disabilities Act as well as any other applicable Federal and State laws and regulations.     You have the right to a reasonable, timely response to your request or need for care, as well as the right to considerate and respectful care including an environment that preserves dignity and contributes to a positive self-image. You are responsible for being considerate and respectful of our staff.     You have a right to information regarding patient rights, advocacy services and complaint mechanisms, and the right to prompt resolution of any complaint. You or a designee has the right to participate in the resolution of ethical issues surrounding your care. You have a right to file a complaint if you feel that your rights have been infringed, without fear or penalty from [facility] or the federal government. You may file a complaint with the Director of Outpatient Case Management by calling [phone]. At any time, you may lodge a grievance with the [facility] and [facility] by calling [phone], or the Joint Commission on Accreditation of Healthcare Organizations at ([phone].     You, or someone acting on your behalf, have the right to understandable information on your health status, treatment and progress in order to make decisions. You have the right to know the nature, risks and alternatives to treatment. You have the right to be informed, when appropriate, regarding the outcome of the care that has been provided. You have the right to refuse treatment to the extent permitted by law, and the right to be informed of the alternatives and consequences of refusing treatment.     You, in collaboration with your physician, have the right to make decisions regarding care and the right to participate in the development and implementation of the plan of care and effective pain management. You have the right to know the name and professional status of  those responsible for the delivery of your care and treatment.       You have a right, within legal guidelines, to have a guardian, next-of-kin or legal designee exercises your patient rights when you are unable to do so. You have the right for your wishes regarding end-of-life decisions to be addressed by the healthcare team through advance directives. You have the right to personal privacy and confidentiality and to expect confidentiality of all records and communications pertaining to your care.      You have the right to receive communications about your health information confidentially. You have the right to request restrictions on the uses and disclosures of your health information. You have the right to inspect, copy, request amendments and receive an accounting of to whom we have disclosed your health information.     You have the right to be provided with interpretation services if you do not speak English; to alternative communication techniques if you are hearing or vision impaired; and to have any other resources needed on your behalf to ensure effective communication. These services are provided free of charge.     You have a right to personal safety (free from mental, physical, sexual and verbal abuse, neglect and exploitation). You have the right to access protective and advocacy services.     Advance Directives  A Patient Advocate is available to meet with patients to answer questions regarding advance directives.    Living Will  A document that outlines what medical treatment the patient does or does not want in the event the patient becomes unable to make those decisions at the appropriate time.    Durable Medical Power of   A document by which the patient designates an individual to be responsible for making medical decisions in the event the patient becomes unable to do so.    HIPAA Notice of Privacy Practices  Your medical information is governed by federal privacy laws. HIPAA  protects private medical information and how that information is disclosed. If you have a question regarding the HIPAA Notice of Privacy Practices, or if you believe your privacy rights have been violated, you may call our designated hotline at ([phone].            Quality Improvement  Because we consistently strive to improve the care and service provided to our patients, we welcome your feedback. Your comments are an important part of our quality improvement process, as we like to know what we are doing right and which areas are in need of improvement. Our policy is to listen, be responsive and provide you with an appropriate and timely follow-up to your questions or concerns. Our goal is active patient and family involvement in all aspects of the care process.          Reporting a Grievance or Complaint    During your time with the [facility] team you may have a grievance or complaint with our services. Your Patients Bill of Rights gives patients, families, and caregivers the right to express concerns and grievances and the right to expect a reasonable and timely response.     Your presentation of your concerns is not viewed negatively. It is an opportunity for us to improve the quality of our care and services we provide to you.     You may report your concerns directly to your , or you can phone in a complaint to:     Director of Outpatient Case Management  [phone]    You may also send a complaint letter to:    Director of Outpatient Case Management Services  [address] [place] [id number]    Tell us the details of your complaint and provide us with a contact phone number so we can contact you to obtain additional information. We will return a call to you within two business days of our receipt of your complaint, and to request additional information as needed. If you choose to mail a letter, your complaint may take a few days longer to reach us.      All grievances will be addressed as quickly as possible. A grievance or complaint that involves situations or practices which place patients in immediate danger will be addressed as an urgent matter. We will work to resolve all other complaints within seven days of receipt. By that time, you will receive a phone call with either the resolution of your complaint, or a plan for corrective action. A formal written response will be sent to you within 30 days of receipt of your grievance.     If a resolution cannot be completed within 30 days, a letter will be sent to you or your family member with an estimated time for the final response.    Additionally, all patients have the right to file complaints with external agencies, without exception. Complaints/grievances can be addressed to the following agencies:            Patient Safety or Quality of Care Concerns  Office of Quality Monitoring   [facility] [place] [address]  [phone] Toll Free    HIPPA Privacy/Security Concerns  Office for Civil Rights Region IV  [place] Department of Health & Human Services  [address], [address]  [place] [address] [phone] Phone  [phone] TDD  [phone] Toll Free    Medicare/Medicaid Billing Concerns  [place] for Medicare & Medicaid Services  Region [address]  [address], [address]  [place] [address] [phone] Phone  [phone] Toll Free    General Concerns  [place] [facility] ([facility])  ([phone] Toll Free Complaint Hotline                                        Consent Form/Release of Information    By signing--     (1) I agree I have read the Outpatient Case Management information provided to me;     (2) I agree to voluntarily participate in the Outpatient Case Management program;     (3) I understand I must consent to participation in the Outpatient Case Management program during my first interview with my Case  Manager;    (4) I consent to the discussion and release of my personal health information to my healthcare team (including my personal physician, my medical home care team, any specialty physician(s), and my [facility] team);     (5) I agree my consent is valid for the length of time I am receiving Outpatient Case Management;    (6) I agree to referrals to community resources which my Case Management team recommends for me. I agree to the release of my personal information and personal health information as necessary to referral sources.    ___________________________________________________________________  Patients Printed Name     ___________________________________________________________________  Patients Signature       Date    If patient is in being cared for, please complete this section:     ___________________________________________________________________  Printed Name of Person Caring For Patient   Relationship To Patient    ___________________________________________________________________   Signature of Person Caring For Patient     Date    PLEASE SIGN AND RETURN IN THE ENCLOSED PRE-ADDRESSED ENVELOPE.

## 2017-07-29 NOTE — PROGRESS NOTES
Please note an Outpatient Complex Care Management welcome packet and consent form was created and mailed to the patient on 7/28/17.    Please contact [facility] at [phone]. [phone] with any questions.    Thank you,    Yancy Rivas, SSC

## 2017-07-31 ENCOUNTER — OUTPATIENT CASE MANAGEMENT (OUTPATIENT)
Dept: ADMINISTRATIVE | Facility: OTHER | Age: 82
End: 2017-07-31

## 2017-07-31 NOTE — LETTER
July 31, 2017    Charline Messer  [address]  [place] [address]             [facility] [address] [place] [address] Dear Mrs. Messer,      Thank you for your time in getting admitted to Outpatient Case Management. I have included some educational brochures in this mail out along with my business card.    Please review the enclosed literature and call me if you have any questions.     Thanks again for your time and cooperation,    Sravani Oconnell RN  Outpatient Case Management  [phone]  Ext [phone]  [email or web address]

## 2017-07-31 NOTE — PATIENT INSTRUCTIONS
Arthralgia    Arthralgia is the term for pain in or around the joint. It is a symptom, not a disease. This pain may involve one or more joints. In some cases, the pain moves from joint to joint.  There are many causes for joint pain. These include:  · Injury  · Osteoarthritis (wearing out of the joint surface)  · Gout (inflammation of the joint due to crystals in the joint fluid)  · Infection inside the joint    · Bursitis (inflammation of the fluid-filled sacs around the joint)  · Autoimmune disorders such as rheumatoid arthritis or lupus  · Tendonitis (inflamation of chords that attach muscle to bone)  Home care  · Rest the involved joint(s) until your symptoms improve.   · You may be prescribed pain medication. If none is prescribed, you may use acetaminophen or ibuprofen to control pain and inflammation.  Follow up  Follow up with your healthcare provider or our staff as advised.  When to seek medical care  Contact your healthcare provider right away if any of the following occurs:  · Pain, swelling, or redness of joint increases  · Pain worsens or recurs after a period of improvement  · Pain moves to other joints  · You cannot bear weight on the affected joint   · You cannot move the affected joint  · Joint appears deformed  · New rash appears  · Fever of 101ºF (38.8ºC) or higher, or as directed by your healthcare provider  Date Last Reviewed: 4/26/2015  © 0621-3875 [employer]. [address] [place] [address], [place] [id number]. All rights reserved. This information is not intended as a substitute for professional medical care. Always follow your healthcare professional's instructions.        Exercise for a Healthier Heart  You may wonder how you can improve the health of your heart. If youre thinking about exercise, youre on the right track. You dont need to become an athlete, but you do need a certain amount of brisk exercise to help strengthen your heart. If you have been diagnosed with a heart  condition, your doctor may recommend exercise to help stabilize your condition. To help make exercise a habit, choose safe, fun activities.     Exercise with a friend. When activity is fun, you're more likely to stick with it.     Be sure to check with your healthcare provider before starting an exercise program.   Why exercise?  Exercising regularly offers many healthy rewards. It can help you do all of the following:  · Improve your blood cholesterol level to help prevent further heart trouble  · Lower your blood pressure to help prevent a stroke or heart attack  · Control diabetes, or reduce your risk of getting this disease  · Improve your heart and lung function  · Reach and maintain a healthy weight  · Make your muscles stronger and more limber so you can stay active  · Prevent falls and fractures by slowing the loss of bone mass (osteoporosis)  · Manage stress better  · Reduce your blood pressure  · Improve your sense of self and your body image  Exercise tips  Ease into your routine. Set small goals. Then build on them.  Exercise on most days. Aim for a total of 150 or more minutes of moderate to  vigorous intensity activity each week. Consider 40 minutes, 3 to 4 times a week. For best results, activity should last for 40 minutes on average. It is OK to work up to the 40 minute period over time. Examples of moderate-intensity activity is walking 1 mile in 15 minutes or 30 to 45 minutes of yard work.  Step up your daily activity level. Along with your exercise program, try being more active throughout the day. Walk instead of drive. Do more household tasks or yard work.  Choose one or more activities you enjoy. Walking is one of the easiest things you can do. You can also try swimming, riding a bike, dancing, or taking an exercise class.  Stop exercising and call your doctor if you:  · Have chest pain or feel dizzy or lightheaded  · Feel burning, tightness, pressure, or heaviness in your chest, neck,  shoulders, back, or arms  · Have unusual shortness of breath  · Have increased joint or muscle pain  · Have palpitations or an irregular heartbeat   Date Last Reviewed: 5/1/2016 © 2000-2016 [employer]. [address] [place] [address], [place] [address]. All rights reserved. This information is not intended as a substitute for professional medical care. Always follow your healthcare professional's instructions.        Heart Disease Education    The heart beats 60 to 100 times per minute, 24 hours a day. This equals almost 1000,000 times a day. It pumps blood with oxygen and nutrients to the tissues and organs of the body. But the heart is a muscle and needs its own supply of blood. Blood flow to the heart is supplied by the coronary arteries. Coronary artery disease (atherosclerosis) is a result of cholesterol, saturated fat, and calcium deposits (plaques) that build up inside the walls. This causes inflammation within the coronary arteries. These plaques narrow the artery and reduce blood flow to the heart muscle. The reduction in blood flow to the heart muscle decreases oxygen supply to the heart. If the narrowing is significant enough, the oxygen supply to one or more regions of the heart can be temporarily or permanently shut down. This can cause chest pain, and possibly death of heart tissue (heart attack).  Types of chest pain  Angina is the name for pain in the heart muscle. Angina is a warning sign of serious heart disease. When untreated it can lead to a heart attack, also known as acute myocardial infarction, or AMI. Angina occurs when there is not enough blood and oxygen flowing to the heart for the amount of work it is doing. This most often happens during physical exertion, when the heart is working hardest. It is usually relieved by rest or nitroglycerin. Angina may also occur after a large meal when extra blood is sent to the digestive organs and less goes to the heart. In the case of advanced  or unstable heart disease, angina can occur at rest or awaken you from sleep. Angina usually lasts from a few minutes up to 20 minutes or more. When treated early, the effects of angina can be reversed without permanent damage to the heart. Angina is a serious condition and needs to be evaluated by a medical professional immediately.  There are two types of angina -- stable and unstable:  · Stable angina usually occurs with a predictable level of activity. Being stable, its character, severity, and occurrence do not change much over time. It usually starts with activity, and resolves with rest or taking your medicine as instructed by your doctor. The symptoms usually do not last long.  · Unstable angina changes or gets worse over time. It is different from whatever you are used to. It may feel different or worse, begin without cause, occur with exercise or exertion, wake you up from sleep, and last longer. It may not respond in the same way as it does when you take your usual medicines for an attack. This type of angina can be a warning sign of an impending heart attack.     A heart attack is usually the result of a blood clot that suddenly forms in a coronary artery that has been narrowed with plaque. When this occurs, blood flow may be cut off to a part of the heart muscle, causing the cells to die. This weakens the pumping action of the heart, which affects the delivery of blood to all the other organs in the body including the brain. This damage is not reversible. However, early treatment can limit the amount of damage.  The pain you feel with angina and a heart attack may have a similar quality. However, it is usually different in intensity and duration. Here are some typical descriptions of a heart attack:  · It is most often experienced as a squeezing, crushing, pressure-like sensation in the center of the chest.  · It is sometimes described as something heavy sitting on my chest.  · It may feel more like a  "bad case of indigestion.  · The pain may spread from the chest to the arm, shoulder, throat or jaw.  · Sometimes the pain is not felt in the chest at all, but only in the arm, shoulder, throat or jaw.  · There may also be nausea, vomiting, dizziness or light-headedness, sweating and trouble breathing.  · Palpitations, or your heart beating rapidly  · A new, irregular heart beat  · Unexplained weakness  You may not be able to tell the difference between "bad" angina and a heart attack at home. Seek help if your symptoms are different than usual. Do not be in denial or just try to "tough it out."  Call 911  This is the fastest and safest way to get to the emergency department. The paramedics can also start treatment on the way to the hospital, saving valuable time for your heart.  · If the angina gets worse, if it continues, or if it stops and returns, call 911 immediately. Do not delay. You may be having a heart attack.  · After you call 911, take a second tablet or spray unless instructed otherwise. When repeating doses, sit down if possible, because it can make you feel lightheaded or dizzy. Wait another 5 minutes. If the angina still does not go away, take a third tablet or spray. Do not take more than 3 tablets or sprays within 15 minutes. Stay on the phone with 911 for further instruction.  · Your healthcare provider may give you slightly different instructions than those above. If so, follow them carefully.  Do not wait until symptoms become severe to call 911.  Other reasons to call 911 include:  · Trouble breathing  · Feeling lightheaded, faint, or dizzy  · Rapid heart beat  · Slower than usual heart rate compared to your normal  · Angina with weakness, dizziness, fainting, heavy sweating, nausea, or vomiting  · Extreme drowsiness, confusion  · Weakness of an arm or leg or one side of the face  · Difficulty with speech or vision  When to seek medical care  Remember, the signs and symptoms of a heart attack " "are not always like they are on TV. Sometimes they are not so obvious. You may only feel weak, or just not right. If it is not clear or if you have any doubt, call for advice.  · Seek help if there is a change in the type of pain, if it feels different, or if your symptoms are mild.  · Do not drive yourself. Have someone else drive you. If no one can drive, call 911.  · Do not delay. Fast diagnosis and treatment can prevent or limit the amount of heart damage during a heart attack.  · Do not go to your doctor's office or a clinic as they may not be able to provide all the testing and treatment required for this condition.  · If your doctor has given you medicine to take when symptoms occur, take them but don't delay getting help trying to locate medicines.  What happens in the emergency department  The emergency department is connected to your local emergency medical system (EMS) through 911. That's why during a cardiac emergency, calling 911 is the fastest way to get help. The goal of the emergency department is to rapidly screen, evaluate, and treat people.  Once you are there, an electrocardiogram (ECG or heart tracing) will be done. Blood samples may be taken to look for the presence of heart enzymes that leak from damaged heart cells and show if a heart attack is occurring. You will often be evaluated by a heart specialist (cardiologist) who decides the best course of action. In the case of severe angina or early heart attack, and depending on the circumstances, powerful "clot busting" medicines can be used to dissolve blood clots in the coronary artery. In other cases, you may be taken to a cardiac catheterization lab. Here, a tiny balloon-tipped catheter is advanced through blood vessels to the heart. There the balloon is inflated pushing open the blood vessel restoring blood flow.  Risk factors for heart disease  Risk factors for heart disease are a combination of genetic and lifestyle. Many risk factors " work by either directly or indirectly damaging the blood vessels of the heart, or by increasing the risk of forming blood or cholesterol clots, which then clog up and block the arteries.     Examples of physical lifestyle risk factors:  · Cigarette smoking  · High blood pressure  · High blood cholesterol  · Use of stimulant drugs such as cocaine, crack, and amphetamines  · Eating a high-fat, high-cholesterol meal  · Diabetes   · Obesity which increases risk for diabetes and high blood pressure  · Lack of regular physical activity     Examples of emotional lifestyle factors:  · Chronic high stress levels release stress hormones. These raise blood pressure and cholesterol level and makes blood clot more easily.  · Held-in anger, hostile or cynical attitude  · Social and emotional isolation, lack of intimacy  · Loss of relationship  · Depression  Other factors that increase the risk of heart attack that you cannot control :  · Age. The older you get beyond 40, the greater is your risk of significant coronary artery disease.  · Gender. More men than women get heart disease; but once past menopause, women who are not taking estrogen replacement have the same risk as men for a heart attack.  · Family history. If your mother, father, brother or sister has coronary artery disease, your risk of having it is higher than a person your age without this family history.  What can you do to decrease your risk  To reduce your risk of heart disease:  · Get regular checkups with your doctor.  · Take your medicines for blood pressure, cholesterol or diabetes as directed.  · Watch your diet. Eat a heart healthy diet choosing fresh foods, less salt, cholesterol, and fat  · Stop smoking. Get help if needed.  · Get regular exercise.  · Manage stress.  · Carry a list of medicines and doses in your wallet.  Date Last Reviewed: 12/30/2015  © 3464-2979 [employer]. [address] [place] [address], [place] [address]. All rights  reserved. This information is not intended as a substitute for professional medical care. Always follow your healthcare professional's instructions.        Women and Heart Disease: What Women Need to Know  You may be surprised to learn that heart disease is the biggest threat to your health--even more so than breast cancer. And the same factors that put you at risk of a heart attack, also known as acute myocardial infarction, or AMI, also increase your chances of stroke and other health problems. The main risk factors include high blood pressure, smoking, poor diet, diabetes, family history, obesity, and sedentary lifestyle. If your heart is in trouble, your body may send you warning signs. Its up to you to notice these and talk to your healthcare provider about them. Your health--and your life--could depend on it.    Learn to read the signs  When your heart isnt getting enough oxygen, you may experience a feeling called angina. It can be a sign that you are at risk for having a heart attack.  Angina is often referred to as chest pain, but this can be misleading. Its not always painful, and its not always in the chest. Many women have other symptoms along with--or instead of--chest pain or discomfort. Talk to your healthcare provider if you notice any of the following:  · Discomfort, aching, tightness, or pressure that comes and goes, in the back, abdomen, arm, shoulder, neck, or jaw or chest  · Feeling much more tired than usual, for no clear reason  · Becoming breathless while doing something that used to be easy  · Heartburn, nausea, or a burning feeling that seems unrelated to food  · Lightheadedness or faintness  Get to the heart of the problem  Women often dont realize their symptoms could be related to heart trouble. Even some healthcare providers dont make the connection. If you feel any of the symptoms listed here, see your healthcare provider and ask to be tested for heart disease--even if youre  not sure thats the cause. Tests, such as a stress echocardiogram and nuclear imaging, will reveal more about the problem. If your symptoms are heart-related, your healthcare provider will start treatment.  Hormone therapy is not the answer  Healthcare providers used to think that older women could reduce their heart disease risk by taking hormones in pill form (hormone therapy, or HT). It turns out thats not true. In fact, HT could actually increase your risk of having a heart attack or stroke. Talk to your healthcare provider about the risks and benefits of HT. It may be prescribed for other health problems. But it should not be taken to prevent or treat heart disease.     Is it angina or a heart attack?  Angina that occurs on exertion and goes away after a few minutes of rest or with medicine is considered stable angina. Unstable angina is unpredictable or unexpected angina symptoms that do not resolve, or go away and come back. This is a medical emergency and could be a sign of an active heart attack. Call 911 right away!   Date Last Reviewed: 4/8/2016 © 2000-2016 [email or web address]. [address] [place] [address], [place] [address]. All rights reserved. This information is not intended as a substitute for professional medical care. Always follow your healthcare professional's instructions.

## 2017-07-31 NOTE — PROGRESS NOTES
7/31/17 - Phone contact with pt today for completion of Nursing Assessment for OPCM. She denies use of assistive device for ambulation and does not have any assistive equipment in her bathroom, like grab bars or shower chair. She is still doing most all of her ADL's/IADL's independently, other than driving which she does very little of. She does have to rest often and sometimes sit down before completing tasks, but continues to do basically everything independently except drive. CM questioned her about her thoughts of what her primary health risks are and she denied any, never mentioning recent STEMI's X 2 or the pain to her L leg that she described as chronic and constant at 9/10 on last contact. She also refused assistance from Robert Bolaños with Patient Pharmacy Assistance Program, when she contacted her, stating she needs help with medical care costs, not medication costs. She had told this CM she needed assistance with both and she is on two meds that are available for assistance. CM questioned whether she went to the Patient Financial Services office when at [facility] for a scheduled appt as discussed with CM on last contact, and she advised she did not related to not having time. She has an appt on 8/2/17 and CM again encouraged her to go to the Patient Financial Assistance office, but she said she was not going to have time then either. CM will mail educational literature related to CAD secondary to pt having two recent admissions for STEMI's and will follow up at a later time. Sravani Oconnell RN

## 2017-08-02 ENCOUNTER — OFFICE VISIT (OUTPATIENT)
Dept: CARDIOLOGY | Facility: CLINIC | Age: 82
End: 2017-08-02
Payer: MEDICARE

## 2017-08-02 VITALS
WEIGHT: 145.75 LBS | HEART RATE: 66 BPM | DIASTOLIC BLOOD PRESSURE: 78 MMHG | BODY MASS INDEX: 28.61 KG/M2 | HEIGHT: 60 IN | SYSTOLIC BLOOD PRESSURE: 132 MMHG

## 2017-08-02 DIAGNOSIS — I73.9 PAD (PERIPHERAL ARTERY DISEASE): Chronic | ICD-10-CM

## 2017-08-02 DIAGNOSIS — I25.10 CORONARY ARTERY DISEASE INVOLVING NATIVE CORONARY ARTERY OF NATIVE HEART WITHOUT ANGINA PECTORIS: Primary | ICD-10-CM

## 2017-08-02 DIAGNOSIS — I10 ESSENTIAL HYPERTENSION: Chronic | ICD-10-CM

## 2017-08-02 DIAGNOSIS — E78.5 HYPERLIPIDEMIA, UNSPECIFIED HYPERLIPIDEMIA TYPE: ICD-10-CM

## 2017-08-02 PROCEDURE — 99214 OFFICE O/P EST MOD 30 MIN: CPT | Mod: S$GLB,,, | Performed by: NURSE PRACTITIONER

## 2017-08-02 PROCEDURE — 1125F AMNT PAIN NOTED PAIN PRSNT: CPT | Mod: S$GLB,,, | Performed by: NURSE PRACTITIONER

## 2017-08-02 PROCEDURE — 1159F MED LIST DOCD IN RCRD: CPT | Mod: S$GLB,,, | Performed by: NURSE PRACTITIONER

## 2017-08-02 PROCEDURE — 99499 UNLISTED E&M SERVICE: CPT | Mod: S$GLB,,, | Performed by: NURSE PRACTITIONER

## 2017-08-02 PROCEDURE — 99999 PR PBB SHADOW E&M-EST. PATIENT-LVL III: CPT | Mod: PBBFAC,,, | Performed by: NURSE PRACTITIONER

## 2017-08-02 RX ORDER — ALBUTEROL 2 MG/1
2 TABLET ORAL 3 TIMES DAILY
COMMUNITY
End: 2018-07-16

## 2017-08-02 NOTE — PROGRESS NOTES
Subjective:   Patient ID:  Charline Messer is a 82 y.o. female who presents for follow up of Coronary Artery Disease (s/p stemi/cath)      HPI    Patient presents to clinic for hospital follow up. She was [place] [facility] [place]. Patient taken emergently to the cath lab and noted to have nonobstructive CAD and LVEF 55%.Had an ER visit about a week after hospital discharge for SOB. She was hypoxic and wheezing. Also noted to have elevated 's/100's. She was treated with IV hydralazine, Lopressor Lasix, IV steroids and nebulizer treatment. She has not had any more issues with SOB since most recent ER visit. Has no chest pain, has mild edema, has no orthopnea or PND. Has occasional dizziness. Has no abnormal bleeding on dual antiplatelets. She complains of left leg pain.. She describes the discomfort as stabbing and shoots from her hip throughout her leg.  Has not had any long distance travel, fever or chills.  BP well controlled on current regimen.     Past Medical History:   Diagnosis Date    Allergic rhinitis, cause unspecified     Arthritis     Asthma     as a child    Benign colonic polyp     Breast cancer mid 1980s    right; per patient s/p right mastectomy and chemo, unsure about radiation    Coronary artery disease     Coronary artery disease involving native coronary artery of native heart without angina pectoris 8/2/2017    Depression     Diverticular disease     External hemorrhoids     Gout attack     Hyperlipidemia     Hypertension     Hypothyroidism     Obesity (BMI 30-39.9) 10/24/2016    Osteoarthritis     Peripheral vascular disease     Postmenopausal     Pulmonary embolism     reported per patient; unsure date    ST elevation myocardial infarction (STEMI) of anterior wall 6/27/2017    Thrombocytopenia     Tinnitus aurium     Vitamin D deficiency disease        Past Surgical History:   Procedure Laterality Date    CARDIAC CATHETERIZATION      PARTIAL  HYSTERECTOMY      Due to fibroids    Right mastectomy         Social History   Substance Use Topics    Smoking status: Never Smoker    Smokeless tobacco: Never Used    Alcohol use No       Family History   Problem Relation Age of Onset    Heart disease Mother     Hypertension Mother     Stroke Mother     Hypertension Father     Heart disease Sister     No Known Problems Son     Cancer Neg Hx     Diabetes Neg Hx     Kidney disease Neg Hx        Current Outpatient Prescriptions   Medication Sig    albuterol (PROVENTIL) 2 MG Tab Take 2 mg by mouth 3 (three) times daily.    aspirin (ECOTRIN) 81 MG EC tablet Take 1 tablet by mouth Daily.    atorvastatin (LIPITOR) 80 MG tablet Take 1 tablet (80 mg total) by mouth once daily.    clopidogrel (PLAVIX) 75 mg tablet Take 1 tablet (75 mg total) by mouth once daily.    colchicine 0.6 mg tablet Take 1 pill daily prn    diclofenac sodium (VOLTAREN) 1 % Gel Apply 2 g topically 2 (two) times daily.    fluticasone (FLONASE) 50 mcg/actuation nasal spray 2 sprays by Each Nare route daily as needed for Rhinitis.    hydrocodone-acetaminophen 5-325mg (NORCO) 5-325 mg per tablet Take 1 tablet by mouth daily as needed.    isosorbide mononitrate (IMDUR) 30 MG 24 hr tablet Take 1 tablet (30 mg total) by mouth once daily.    metoprolol tartrate (LOPRESSOR) 25 MG tablet Take 1 tablet (25 mg total) by mouth 2 (two) times daily.    furosemide (LASIX) 20 MG tablet Take 1 tablet by mouth Daily. prn    ranolazine (RANEXA) 500 MG Tb12 Take 1 tablet (500 mg total) by mouth 2 (two) times daily.     Current Facility-Administered Medications   Medication    lidocaine-EPINEPHrine 1%-1:100,000 injection 5 mL     Current Outpatient Prescriptions on File Prior to Visit   Medication Sig    aspirin (ECOTRIN) 81 MG EC tablet Take 1 tablet by mouth Daily.    atorvastatin (LIPITOR) 80 MG tablet Take 1 tablet (80 mg total) by mouth once daily.    clopidogrel (PLAVIX) 75 mg tablet Take  1 tablet (75 mg total) by mouth once daily.    colchicine 0.6 mg tablet Take 1 pill daily prn    diclofenac sodium (VOLTAREN) 1 % Gel Apply 2 g topically 2 (two) times daily.    fluticasone (FLONASE) 50 mcg/actuation nasal spray 2 sprays by Each Nare route daily as needed for Rhinitis.    hydrocodone-acetaminophen 5-325mg (NORCO) 5-325 mg per tablet Take 1 tablet by mouth daily as needed.    isosorbide mononitrate (IMDUR) 30 MG 24 hr tablet Take 1 tablet (30 mg total) by mouth once daily.    metoprolol tartrate (LOPRESSOR) 25 MG tablet Take 1 tablet (25 mg total) by mouth 2 (two) times daily.    furosemide (LASIX) 20 MG tablet Take 1 tablet by mouth Daily. prn    ranolazine (RANEXA) 500 MG Tb12 Take 1 tablet (500 mg total) by mouth 2 (two) times daily.    [DISCONTINUED] albuterol 90 mcg/actuation inhaler Inhale 2 puffs into the lungs every 6 (six) hours as needed for Wheezing. Rescue     Current Facility-Administered Medications on File Prior to Visit   Medication    lidocaine-EPINEPHrine 1%-1:100,000 injection 5 mL       Review of Systems   Constitution: Negative for decreased appetite, weakness, malaise/fatigue, weight gain and weight loss.   HENT: Negative for nosebleeds.    Cardiovascular: Negative for chest pain, claudication, dyspnea on exertion, irregular heartbeat, leg swelling, near-syncope, orthopnea, palpitations, paroxysmal nocturnal dyspnea and syncope.   Respiratory: Negative for cough, shortness of breath, sleep disturbances due to breathing, snoring and wheezing.    Hematologic/Lymphatic: Negative for bleeding problem. Does not bruise/bleed easily.   Skin: Negative for rash.   Musculoskeletal: Positive for back pain. Negative for arthritis, falls, joint pain, joint swelling, muscle cramps, muscle weakness and myalgias.   Gastrointestinal: Negative for bloating, abdominal pain, constipation, diarrhea, heartburn, nausea and vomiting.   Genitourinary: Negative for dysuria, hematuria and  nocturia.   Neurological: Positive for paresthesias (right leg). Negative for excessive daytime sleepiness, dizziness, light-headedness, loss of balance, numbness and vertigo.       Objective:   Physical Exam   Constitutional: She is oriented to person, place, and time. She appears well-developed and well-nourished.   Neck: Neck supple. No JVD present.   Cardiovascular: Normal rate, regular rhythm and normal heart sounds.  Exam reveals no friction rub.    No murmur heard.  Pulses:       Carotid pulses are 2+ on the right side, and 2+ on the left side.       Radial pulses are 2+ on the right side, and 2+ on the left side.        Femoral pulses are 2+ on the right side, and 2+ on the left side.       Dorsalis pedis pulses are 1+ on the right side, and 2+ on the left side.        Posterior tibial pulses are 2+ on the right side, and 2+ on the left side.   Pulmonary/Chest: Effort normal and breath sounds normal. No respiratory distress. She has no wheezes. She has no rales.   Abdominal: Soft. Bowel sounds are normal. She exhibits no distension.   Musculoskeletal: She exhibits no edema or tenderness.   Neurological: She is alert and oriented to person, place, and time.   Skin: Skin is warm and dry. No rash noted.   Right groin access site without redness, tenderness, swelling, or drainage. There is no active external bleeding or hematoma.    Psychiatric: She has a normal mood and affect. Her behavior is normal.   Nursing note and vitals reviewed.    Vitals:    08/02/17 1342   BP: 132/78   BP Location: Left arm   Patient Position: Sitting   BP Method: Manual   Pulse: 66   Weight: 66.1 kg (145 lb 11.6 oz)   Height: 5' (1.524 m)     Lab Results   Component Value Date    CHOL 217 (H) 06/27/2017    CHOL 239 (H) 01/19/2016    CHOL 224 (H) 10/31/2014     Lab Results   Component Value Date    HDL 66 06/27/2017    HDL 72 01/19/2016    HDL 77 (H) 10/31/2014     Lab Results   Component Value Date    LDLCALC 135.8 06/27/2017     LDLCALC 149.2 01/19/2016    LDLCALC 133.8 10/31/2014     Lab Results   Component Value Date    TRIG 76 06/27/2017    TRIG 89 01/19/2016    TRIG 66 10/31/2014     Lab Results   Component Value Date    CHOLHDL 30.4 06/27/2017    CHOLHDL 30.1 01/19/2016    CHOLHDL 34.4 10/31/2014       Chemistry        Component Value Date/Time     07/03/2017 1730    K 3.9 07/03/2017 1730     07/03/2017 1730    CO2 25 07/03/2017 1730    BUN 11 07/03/2017 1730    CREATININE 1.0 07/03/2017 1730    GLU 93 07/03/2017 1730        Component Value Date/Time    CALCIUM 9.4 07/03/2017 1730    ALKPHOS 125 07/03/2017 1730    AST 27 07/03/2017 1730    ALT 33 07/03/2017 1730    BILITOT 0.8 07/03/2017 1730    ESTGFRAFRICA >60 07/03/2017 1730    EGFRNONAA 53 (A) 07/03/2017 1730          Lab Results   Component Value Date    TSH 3.784 01/19/2016     Lab Results   Component Value Date    INR 1.0 06/27/2017    INR 1.0 09/11/2014    INR 1.1 01/11/2013     Lab Results   Component Value Date    WBC 7.93 07/03/2017    HGB 12.7 07/03/2017    HCT 39.1 07/03/2017    MCV 88 07/03/2017     (L) 07/03/2017     BMP  Sodium   Date Value Ref Range Status   07/03/2017 143 136 - 145 mmol/L Final     Potassium   Date Value Ref Range Status   07/03/2017 3.9 3.5 - 5.1 mmol/L Final     Chloride   Date Value Ref Range Status   07/03/2017 106 95 - 110 mmol/L Final     CO2   Date Value Ref Range Status   07/03/2017 25 23 - 29 mmol/L Final     BUN, Bld   Date Value Ref Range Status   07/03/2017 11 8 - 23 mg/dL Final     Creatinine   Date Value Ref Range Status   07/03/2017 1.0 0.5 - 1.4 mg/dL Final     Calcium   Date Value Ref Range Status   07/03/2017 9.4 8.7 - 10.5 mg/dL Final     Anion Gap   Date Value Ref Range Status   07/03/2017 12 8 - 16 mmol/L Final     eGFR if    Date Value Ref Range Status   07/03/2017 >60 >60 mL/min/1.73 m^2 Final     eGFR if non    Date Value Ref Range Status   07/03/2017 53 (A) >60 mL/min/1.73 m^2  Final     Comment:     Calculation used to obtain the estimated glomerular filtration  rate (eGFR) is the CKD-EPI equation. Since race is unknown   in our information system, the eGFR values for   -American and Non--American patients are given   for each creatinine result.       CrCl cannot be calculated (Patient's most recent sCr result is older than the maximum 7 days allowed.).    Assessment:     1. Coronary artery disease involving native coronary artery of native heart without angina pectoris    2. PAD (peripheral artery disease)    3. Essential hypertension    4. Hyperlipidemia, unspecified hyperlipidemia type       Sounds like she is having MSK to her posterior knee. I do not suspect DVT based on her exam  BP stable.   No angina or equivalent  Has known history of PAD, but has good distal pulses. Did have mild PAD on arterial studies in Oct 2015. Will recheck studies  No CNS complaints to suggest TIA or CVA  Nonobstructive CAD on angiogram  No abnormal bleeding on dual antiplatelets.     Plan:   Repeat arterial studies to BLE-phone review of results  Heart healthy diet  Risk factor modification  Walking program as tolerated  RTC in 6 months otherwise with fasting labs with PCP

## 2017-08-07 ENCOUNTER — OUTPATIENT CASE MANAGEMENT (OUTPATIENT)
Dept: ADMINISTRATIVE | Facility: OTHER | Age: 82
End: 2017-08-07

## 2017-08-07 ENCOUNTER — TELEPHONE (OUTPATIENT)
Dept: PHARMACY | Facility: CLINIC | Age: 82
End: 2017-08-07

## 2017-08-07 NOTE — PROGRESS NOTES
"8/7/17 - Phone contact with pt this AM. She reports she has not yet rec'd educational literature mailed by CM about CAD. Instructed to watch for it in the mail and review it when it arrives and CM will discuss it with her and answer any questions she may have on next contact. Discussed cardiac diet restrictions and she is unable to name what she should be avoiding, but states she does not eat any "grease". Advised that is her primary concern and discussed foods that are high in cholesterol that she should avoid. Also encouraged her to monitor Na and she reports she does not like a lot of salt so this is not a problem for her. Questioned about meds as she told Patient Pharmacy Assistance tech she did not need any help with any meds. She reviews med bottles in home and does not name Ranexa. Questioned whether she was able to buy it when d/c'd from hospital recently and she then remembered she did not buy it because she could not afford it. Advised CM [facility] her [facility], as she is supposed to be taking it and is also on an inhaler that is available for assistance. She stated she would like to meed with her in person to complete applications when she goes to [facility] later this week for appts. CM messaged D [facility] and requested that she contact pt again and schedule an appt for this Friday per pt's request. She advised she would call pt again. CM will follow up at a later time. Sravani Oconnell RN  "

## 2017-08-07 NOTE — TELEPHONE ENCOUNTER
Spoke with patient about referral for medication.  Patient has a doctors appointment on Friday the 11th and she will come in to sign application.

## 2017-08-21 ENCOUNTER — OUTPATIENT CASE MANAGEMENT (OUTPATIENT)
Dept: ADMINISTRATIVE | Facility: OTHER | Age: 82
End: 2017-08-21

## 2017-08-21 NOTE — PROGRESS NOTES
8/21/17 - Attempted phone contact with pt at home and mobile numbers with no answer. Rec'd return call from pt's  and he reported pt was home and took the phone to her. CM reminded pt of previous contacts and of reason for call as trying to help her get Ranexa that was ordered when she was recently d/c'd from hospitalization for MI, but she has not yet gotten filled because she could not afford it. She said she was unable to keep appt last Friday with cardio procedures for [facility](appt was No Showed) and therefore, did not come in to sign application with Pharmacy Assistance tech as had been previously scheduled. CM reminded pt of the importance of taking Ranexa as ordered by cardio provider to help manage heart disease symptoms. She indicated that coming to clinic to sign application was not convenient for her and asked if the application can be mailed to her. Advised CM will request that PAP tech mail it to her. Reviewed diet restrictions including low chol foods and food preps(frying) to avoid. Pt does not seem to comprehend the importance of the need for med compliance with Ranexa for help in management of CAD symptoms. CM will message cardio provider of above and PAP tech to request to mail application per pt request and will follow up with pt at a later time. Sravani Oconnell RN

## 2017-09-05 ENCOUNTER — OUTPATIENT CASE MANAGEMENT (OUTPATIENT)
Dept: ADMINISTRATIVE | Facility: OTHER | Age: 82
End: 2017-09-05

## 2017-09-05 NOTE — PROGRESS NOTES
9/5/17 - Attempted phone contact with pt with no answer. Voice mail left on home number requesting return call. Will attempt to follow up at a later time. Sravani Oconnell RN  9/5/17 @ 10:25 AM - Rec'd return call from pt's . He advised he is calling from mobile number on pt's chart which is his business phone and he is not currently with pt. He reports she is doing OK and has been taking meds as prescribed. Her main complaint currently is pain to her legs secondary to circulation problems. Requested that he have pt return CM's call when he returns home later today. CM will follow up next week if no return call from pt. Sravani Oconnell RN

## 2017-09-12 ENCOUNTER — OUTPATIENT CASE MANAGEMENT (OUTPATIENT)
Dept: ADMINISTRATIVE | Facility: OTHER | Age: 82
End: 2017-09-12

## 2017-09-12 NOTE — PROGRESS NOTES
9/12/17 - Phone contact with pt this AM for follow up with [facility]. She reports she is taking all meds as ordered. She denies any questions about any meds. She reports her only problem currently is ongoing pain to her L leg from her hip to her ankle. She denies swelling or other symptoms, just pain. She does not use OTC analgesics and states the Voltaren gel in ineffective. She has never tried hot or cool compresses, but states she will try them to see if they help. CM advised of upcoming appt this week for HRA. Discussed measures to help control CAD in addition to taking meds, including following low chol/fat diet. Discussed foods to avoid(fatty meats) and those to include frequently(vegetables and lean meats/proteins) and preferred ways to prepare including baking and grilling rather than frying. 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 CM also encouraged low Na intake and she advised she does not use a lot of Na in diet. Advised CM will follow up in 3 weeks and will plan to d/c from [facility] at that time if no new problems. Sravani Oconnell RN

## 2017-09-20 ENCOUNTER — TELEPHONE (OUTPATIENT)
Dept: PHARMACY | Facility: CLINIC | Age: 82
End: 2017-09-20

## 2017-09-20 ENCOUNTER — HOSPITAL ENCOUNTER (EMERGENCY)
Facility: HOSPITAL | Age: 82
Discharge: HOME | End: 2017-09-21
Attending: EMERGENCY MEDICINE
Payer: MEDICARE

## 2017-09-20 DIAGNOSIS — I10 ESSENTIAL HYPERTENSION: ICD-10-CM

## 2017-09-20 DIAGNOSIS — M54.42 ACUTE LEFT-SIDED LOW BACK PAIN WITH LEFT-SIDED SCIATICA: Primary | ICD-10-CM

## 2017-09-20 PROCEDURE — 99283 EMERGENCY DEPT VISIT LOW MDM: CPT

## 2017-09-20 PROCEDURE — 25000003 PHARM REV CODE 250: Performed by: EMERGENCY MEDICINE

## 2017-09-20 RX ORDER — HYDROCODONE BITARTRATE AND ACETAMINOPHEN 7.5; 325 MG/1; MG/1
1 TABLET ORAL ONCE
Status: COMPLETED | OUTPATIENT
Start: 2017-09-20 | End: 2017-09-20

## 2017-09-20 RX ORDER — HYDROCODONE BITARTRATE AND ACETAMINOPHEN 5; 325 MG/1; MG/1
1 TABLET ORAL EVERY 4 HOURS PRN
Qty: 16 TABLET | Refills: 0 | Status: SHIPPED | OUTPATIENT
Start: 2017-09-20 | End: 2017-10-16 | Stop reason: SDUPTHER

## 2017-09-20 RX ORDER — METHYLPREDNISOLONE 4 MG/1
TABLET ORAL
Qty: 1 PACKAGE | Refills: 0 | Status: SHIPPED | OUTPATIENT
Start: 2017-09-20 | End: 2017-10-11

## 2017-09-20 RX ADMIN — HYDROCODONE BITARTRATE AND ACETAMINOPHEN 1 TABLET: 7.5; 325 TABLET ORAL at 11:09

## 2017-09-21 VITALS
SYSTOLIC BLOOD PRESSURE: 168 MMHG | BODY MASS INDEX: 27.75 KG/M2 | WEIGHT: 147 LBS | RESPIRATION RATE: 18 BRPM | HEIGHT: 61 IN | DIASTOLIC BLOOD PRESSURE: 80 MMHG | TEMPERATURE: 98 F | HEART RATE: 95 BPM | OXYGEN SATURATION: 99 %

## 2017-09-21 NOTE — ED PROVIDER NOTES
"SCRIBE #1 NOTE: I, Ac Morrell, am scribing for, and in the presence of, Kenisha Raymundo DO. I have scribed the entire note.      History      Chief Complaint   Patient presents with    Leg Pain     left leg pain from hip to ankle, denies any injury        Review of patient's allergies indicates:   Allergen Reactions    Codeine      Other reaction(s): Unknown    Ibuprofen      Other reaction(s): Vomiting  Other reaction(s): Itching    Zetia  [ezetimibe]      Other reaction(s): Vomiting        HPI   HPI    9/20/2017, 10:48 PM   History obtained from the patient      History of Present Illness: Charline Messer is a 82 y.o. female patient who presents to the Emergency Department for LLE pain which onset suddenly one day ago. Pt reports bending down one day ago and when pt tried to resume upright position pt heard a "popping" sound and then a sharp pain starting in her lower back radiating down her LLE. Pt describes pain as "tingling" sensation with a rating of 9 out of 10. Symptoms are intermittent and moderate in severity. No mitigating or exacerbating factors reported. Associated sxs include back pain. Patient denies any fever, chills, n/v, trauma/injury, extremity weakness/numbness, gait problems, bowel/bladder incontinence, urinary frequency/urgency, and all other sxs at this time. No prior Tx reported. No further complaints or concerns at this time.         Arrival mode: Personal vehicle    PCP: Za Rey MD       Past Medical History:  Past Medical History:   Diagnosis Date    Allergic rhinitis, cause unspecified     Arthritis     Asthma     as a child    Benign colonic polyp     Breast cancer mid 1980s    right; per patient s/p right mastectomy and chemo, unsure about radiation    Coronary artery disease     Coronary artery disease involving native coronary artery of native heart without angina pectoris 8/2/2017    Depression     Diverticular disease     External hemorrhoids     Gout " attack     Hyperlipidemia     Hypertension     Hypothyroidism     Obesity (BMI 30-39.9) 10/24/2016    Osteoarthritis     Peripheral vascular disease     Postmenopausal     Pulmonary embolism     reported per patient; unsure date    ST elevation myocardial infarction (STEMI) of anterior wall 6/27/2017    Thrombocytopenia     Tinnitus aurium     Vitamin D deficiency disease        Past Surgical History:  Past Surgical History:   Procedure Laterality Date    CARDIAC CATHETERIZATION      PARTIAL HYSTERECTOMY      Due to fibroids    Right mastectomy           Family History:  Family History   Problem Relation Age of Onset    Heart disease Mother     Hypertension Mother     Stroke Mother     Hypertension Father     Heart disease Sister     No Known Problems Son     Cancer Neg Hx     Diabetes Neg Hx     Kidney disease Neg Hx        Social History:  Social History     Social History Main Topics    Smoking status: Never Smoker    Smokeless tobacco: Never Used    Alcohol use No    Drug use: No    Sexual activity: No       ROS   Review of Systems   Constitutional: Negative for chills and fever.        (-) Trauma/Injury   HENT: Negative for sore throat.    Respiratory: Negative for shortness of breath.    Cardiovascular: Negative for chest pain.   Gastrointestinal: Negative for nausea.   Genitourinary: Negative for dysuria, flank pain, frequency and urgency.   Musculoskeletal: Positive for back pain. Negative for gait problem, neck pain and neck stiffness.        (+) LLE pain   Skin: Negative for rash.   Neurological: Negative for dizziness, weakness, light-headedness, numbness and headaches.        (-) Bowel/bladder incontinence   Hematological: Does not bruise/bleed easily.   All other systems reviewed and are negative.    Physical Exam      Initial Vitals [09/20/17 2124]   BP Pulse Resp Temp SpO2   (!) 196/81 99 18 98.2 °F (36.8 °C) 98 %      MAP       119.33          Physical Exam  Nursing Notes  "and Vital Signs Reviewed.  Constitutional: Patient is in no acute distress. Well-developed and well-nourished. Pt able to ambulate.  Head: Atraumatic. Normocephalic.  Eyes: PERRL. EOM intact. Conjunctivae are not pale. No scleral icterus.  ENT: Mucous membranes are moist. Oropharynx is clear and symmetric.    Neck: Supple. Full ROM. No lymphadenopathy.  Cardiovascular: Regular rate. Regular rhythm. No murmurs, rubs, or gallops. Distal pulses are 2+ and symmetric.  Pulmonary/Chest: No respiratory distress. Clear to auscultation bilaterally. No wheezing, rales, or rhonchi.  Abdominal: Soft and non-distended.  There is no tenderness.  No rebound, guarding, or rigidity. Good bowel sounds.  Genitourinary: No CVA tenderness  Musculoskeletal: Moves all extremities. No obvious deformities. No edema. No calf tenderness.  LLE: no evident deformity. Negative for swelling. Negative for tenderness. ROM is normal. Cap refill distally is <2 seconds. DP and PT pulses are equal and 2+ bilaterally. No motor deficit. No distal sensory deficit.  Back: No tenderness noted. No midline bony tenderness, deformities, or step-offs of the T-spine or L-spine. Skin appears normal without abrasions or bruising. No erythema, induration, or fluctuance.  Skin: Warm and dry.  Neurological: Awake and alert. Appropriate for age. Negative straight leg raise bilaterally. No strength deficit; equal and 5/5 in bilateral upper and lower extremities. No light touch sensory deficit. DTRs 2+ and equal. Normal gait. No acute focal neurological deficits noted.  Psychiatric: Normal affect. Good eye contact. Appropriate in content.    ED Course    Procedures  ED Vital Signs:  Vitals:    09/20/17 2124 09/21/17 0004   BP: (!) 196/81 (!) 168/80   Pulse: 99 95   Resp: 18 18   Temp: 98.2 °F (36.8 °C)    TempSrc: Oral    SpO2: 98% 99%   Weight: 66.7 kg (147 lb)    Height: 5' 1" (1.549 m)        Imaging Results:  Imaging Results          X-Ray Lumbar Spine Complete 5 " View (Final result)  Result time 09/20/17 23:36:02    Final result by Arsenio Bartlett MD (09/20/17 23:36:02)                 Impression:     No acute findings. Degenerative changes.      Electronically signed by: ARSENIO BARTLETT MD  Date:     09/20/17  Time:    23:36              Narrative:    History: Low back pain    There is mild degenerative narrowing of the L3-4 and L4-5 disc spaces. There is facet joint DJD. No evidence of fracture. There is grade 1 anterior spondylolisthesis of L3-4 and L4-5, degenerative in nature.                                      The Emergency Provider reviewed the vital signs and test results, which are outlined above.    ED Discussion     11:45 PM: Reassessed pt at this time.  Pt states her condition has improved at this time. Discussed with pt all pertinent ED information and results. Discussed pt dx and plan of tx. Gave pt all f/u and return to the ED instructions. All questions and concerns were addressed at this time. Pt expresses understanding of information and instructions, and is comfortable with plan to discharge. Pt is stable for discharge.        ED Medication(s):  Medications   hydrocodone-acetaminophen 7.5-325mg per tablet 1 tablet (1 tablet Oral Given 9/20/17 3121)       Discharge Medication List as of 9/20/2017 11:48 PM      START taking these medications    Details   !! hydrocodone-acetaminophen 5-325mg (NORCO) 5-325 mg per tablet Take 1 tablet by mouth every 4 (four) hours as needed for Pain., Starting Wed 9/20/2017, Print      methylPREDNISolone (MEDROL DOSEPACK) 4 mg tablet As directed., Print       !! - Potential duplicate medications found. Please discuss with provider.          Follow-up Information     Za Rey MD. Schedule an appointment as soon as possible for a visit in 2 days.    Specialty:  Family Medicine  Why:  Return to the ED for:  loss of control of bowel/bladder, numbness rectal region, fever, severe pain or other concerns.  Contact  information:  [address] [place] [address]  [phone]                     Medical Decision Making    Medical Decision Making:   Clinical Tests:   Radiological Study: Ordered and Reviewed           Scribe Attestation:   Scribe #1: I performed the above scribed service and the documentation accurately describes the services I performed. I attest to the accuracy of the note.    Attending:   Physician Attestation Statement for Scribe #1: I, Kenisha Raymundo DO, personally performed the services described in this documentation, as scribed by Ac Morrell, in my presence, and it is both accurate and complete.          Clinical Impression       ICD-10-CM ICD-9-CM   1. Acute left-sided low back pain with left-sided sciatica M54.42 724.2     724.3   2. Essential hypertension I10 401.9       Disposition:   Disposition: Discharged  Condition: Stable         Kenisha Raymundo DO  09/21/17 0219

## 2017-09-21 NOTE — ED NOTES
Pt reports left leg pain originating in the hip and going down to her foot. States that the pain feels like pins. Onset of pain is 2 months ago but pain is worsening. Pt denies injury and no deformity noted. ROM is limited due to pain. Denies any long distance travel. States that she is fairly active. No other complaints at this.

## 2017-09-24 ENCOUNTER — HOSPITAL ENCOUNTER (EMERGENCY)
Facility: HOSPITAL | Age: 82
Discharge: HOME | End: 2017-09-24
Payer: MEDICARE

## 2017-09-24 VITALS
HEART RATE: 68 BPM | SYSTOLIC BLOOD PRESSURE: 187 MMHG | BODY MASS INDEX: 27.05 KG/M2 | OXYGEN SATURATION: 99 % | WEIGHT: 147 LBS | HEIGHT: 62 IN | TEMPERATURE: 98 F | RESPIRATION RATE: 20 BRPM | DIASTOLIC BLOOD PRESSURE: 92 MMHG

## 2017-09-24 DIAGNOSIS — M54.32 SCIATICA OF LEFT SIDE: Primary | ICD-10-CM

## 2017-09-24 PROCEDURE — 63600175 PHARM REV CODE 636 W HCPCS: Performed by: PHYSICIAN ASSISTANT

## 2017-09-24 PROCEDURE — 96372 THER/PROPH/DIAG INJ SC/IM: CPT

## 2017-09-24 PROCEDURE — 99283 EMERGENCY DEPT VISIT LOW MDM: CPT | Mod: 25

## 2017-09-24 RX ORDER — PROMETHAZINE HYDROCHLORIDE 25 MG/ML
25 INJECTION, SOLUTION INTRAMUSCULAR; INTRAVENOUS
Status: COMPLETED | OUTPATIENT
Start: 2017-09-24 | End: 2017-09-24

## 2017-09-24 RX ORDER — METHOCARBAMOL 500 MG/1
1000 TABLET, FILM COATED ORAL 3 TIMES DAILY
Qty: 30 TABLET | Refills: 0 | Status: SHIPPED | OUTPATIENT
Start: 2017-09-24 | End: 2017-09-29

## 2017-09-24 RX ORDER — HYDROMORPHONE HYDROCHLORIDE 2 MG/ML
1 INJECTION, SOLUTION INTRAMUSCULAR; INTRAVENOUS; SUBCUTANEOUS
Status: COMPLETED | OUTPATIENT
Start: 2017-09-24 | End: 2017-09-24

## 2017-09-24 RX ADMIN — HYDROMORPHONE HYDROCHLORIDE 1 MG: 2 INJECTION, SOLUTION INTRAMUSCULAR; INTRAVENOUS; SUBCUTANEOUS at 09:09

## 2017-09-24 RX ADMIN — PROMETHAZINE HYDROCHLORIDE 25 MG: 25 INJECTION INTRAMUSCULAR; INTRAVENOUS at 09:09

## 2017-09-24 NOTE — ED NOTES
Patient identifiers verified and correct for Charline Messer.    LOC: The patient is awake, alert and aware of environment with an appropriate affect, the patient is oriented x 3 and speaking appropriately.  APPEARANCE: Patient resting comfortably and in no acute distress, patient is clean and well groomed, patient's clothing is properly fastened.  SKIN: The skin is warm and dry, color consistent with ethnicity, patient has normal skin turgor and moist mucus membranes, skin intact, no breakdown or bruising noted.  MUSCULOSKELETAL: Patient moving all extremities spontaneously. Pt c/o pain to left hip.  RESPIRATORY: Airway is open and patent, respirations are spontaneous.  CARDIAC: Patient has a normal rate, no peripheral edema noted, capillary refill < 3 seconds.  ABDOMEN: Soft and non tender to palpation.

## 2017-09-24 NOTE — ED PROVIDER NOTES
Encounter Date: 9/24/2017       History     Chief Complaint   Patient presents with    Hip Pain     patient c/o left low back pain, left hip pain that radiates down left leg, patient states she was seen in the ER last week for same issue      The history is provided by the patient.   Back Pain    This is a chronic problem. The current episode started several weeks ago. The problem occurs daily. The problem has been waxing and waning. The pain is associated with no known injury. The pain is present in the lumbar spine. The quality of the pain is described as shooting. The pain radiates to the left leg. The pain is at a severity of 3/10. The symptoms are aggravated by bending, twisting and certain positions. Associated symptoms include leg pain. Pertinent negatives include no chest pain, no fever, no numbness, no weight loss, no headaches, no abdominal pain, no abdominal swelling, no bowel incontinence, no perianal numbness, no bladder incontinence, no dysuria, no pelvic pain, no paresthesias, no paresis, no tingling and no weakness. Treatments tried: Norco 5mg, medrol dose pack. The treatment provided mild relief.     Review of patient's allergies indicates:   Allergen Reactions    Codeine      Other reaction(s): Unknown    Ibuprofen      Other reaction(s): Vomiting  Other reaction(s): Itching    Zetia  [ezetimibe]      Other reaction(s): Vomiting     Past Medical History:   Diagnosis Date    Allergic rhinitis, cause unspecified     Arthritis     Asthma     as a child    Benign colonic polyp     Breast cancer mid 1980s    right; per patient s/p right mastectomy and chemo, unsure about radiation    Coronary artery disease     Coronary artery disease involving native coronary artery of native heart without angina pectoris 8/2/2017    Depression     Diverticular disease     External hemorrhoids     Gout attack     Hyperlipidemia     Hypertension     Hypothyroidism     Obesity (BMI 30-39.9) 10/24/2016     Osteoarthritis     Peripheral vascular disease     Postmenopausal     Pulmonary embolism     reported per patient; unsure date    ST elevation myocardial infarction (STEMI) of anterior wall 6/27/2017    Thrombocytopenia     Tinnitus aurium     Vitamin D deficiency disease      Past Surgical History:   Procedure Laterality Date    CARDIAC CATHETERIZATION      PARTIAL HYSTERECTOMY      Due to fibroids    Right mastectomy       Family History   Problem Relation Age of Onset    Heart disease Mother     Hypertension Mother     Stroke Mother     Hypertension Father     Heart disease Sister     No Known Problems Son     Cancer Neg Hx     Diabetes Neg Hx     Kidney disease Neg Hx      Social History   Substance Use Topics    Smoking status: Never Smoker    Smokeless tobacco: Never Used    Alcohol use No     Review of Systems   Constitutional: Negative for chills, fever and weight loss.   HENT: Negative for sore throat.    Eyes: Negative for photophobia and redness.   Respiratory: Negative for cough and shortness of breath.    Cardiovascular: Negative for chest pain.   Gastrointestinal: Negative for abdominal pain, bowel incontinence, diarrhea and nausea.   Endocrine: Negative for polydipsia and polyphagia.   Genitourinary: Negative for bladder incontinence, dysuria and pelvic pain.   Musculoskeletal: Positive for back pain. Negative for arthralgias and myalgias.   Skin: Negative for rash.   Neurological: Negative for tingling, weakness, numbness, headaches and paresthesias.   Hematological: Does not bruise/bleed easily.   Psychiatric/Behavioral: The patient is not nervous/anxious.    All other systems reviewed and are negative.      Physical Exam     Initial Vitals [09/24/17 0846]   BP Pulse Resp Temp SpO2   (!) 187/92 68 20 98.2 °F (36.8 °C) 99 %      MAP       123.67         Physical Exam    Nursing note and vitals reviewed.  Constitutional: Vital signs are normal. She appears well-developed and  well-nourished. She appears distressed (secondary to pain).   HENT:   Head: Normocephalic and atraumatic.   Right Ear: External ear normal.   Left Ear: External ear normal.   Nose: Nose normal.   Mouth/Throat: Oropharynx is clear and moist.   Eyes: Conjunctivae, EOM and lids are normal. Pupils are equal, round, and reactive to light.   Neck: Normal range of motion and full passive range of motion without pain. Neck supple.   Cardiovascular: Normal rate, regular rhythm, S1 normal, S2 normal, normal heart sounds, intact distal pulses and normal pulses.   Pulmonary/Chest: Breath sounds normal. No respiratory distress. She has no wheezes. She has no rales.   Abdominal: Soft. Normal appearance and bowel sounds are normal. She exhibits no distension. There is no tenderness.   Musculoskeletal: Normal range of motion.        Back:    Lymphadenopathy:     She has no cervical adenopathy.   Neurological: She is alert and oriented to person, place, and time. She has normal strength. No cranial nerve deficit or sensory deficit. Coordination and gait normal.   Skin: Skin is warm, dry and intact.   Psychiatric: She has a normal mood and affect. Her speech is normal and behavior is normal. Judgment and thought content normal. Cognition and memory are normal.         ED Course   Procedures  Labs Reviewed - No data to display                       Attending Attestation:     Physician Attestation Statement for NP/PA:   I discussed this assessment and plan of this patient with the NP/PA, but I did not personally examine the patient. The face to face encounter was performed by the NP/PA.                  ED Course      Clinical Impression:   The encounter diagnosis was Sciatica of left side.                           LAKSHMI Salazar  09/24/17 0910       Tucker Vega MD  09/24/17 1651

## 2017-10-03 ENCOUNTER — OUTPATIENT CASE MANAGEMENT (OUTPATIENT)
Dept: ADMINISTRATIVE | Facility: OTHER | Age: 82
End: 2017-10-03

## 2017-10-03 NOTE — PROGRESS NOTES
10/3/17 - Phone contact with pt today for follow up with [facility]. Upon chart review observed pt has been in the ED twice since CM's last contact with her for back and L leg pain/sciatica. She reports today the pain is a little better and she has seen her ortho provider, Dr Marlon Messer and was told she has a pinched nerve. She reports she is to have a MRI on 10/6/17 and will then follow up with him to see what he recommends. CM questioned whether she has rec'd the application for assistance with the cost of Ranexa that was mailed to her by ALEXX Bolaños PAP tech. She reports she has not rec'd it. CM encouraged her to go see ALEXX Bolaños at the [facility] clinic on 10/6/17 when she goes for her MRI. Instructed to go to pharmacy and ask for her and she can help her complete the Ranexa application. She reports she still is not taking Ranexa because she cannot afford it. CM will follow up with her again next week to see if she met with ALEXX Bolaños and completed application. CM will d/c after next contact.  Sravani Oconnell RN

## 2017-10-06 ENCOUNTER — OUTPATIENT CASE MANAGEMENT (OUTPATIENT)
Dept: ADMINISTRATIVE | Facility: OTHER | Age: 82
End: 2017-10-06
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
## 2017-10-10 ENCOUNTER — OUTPATIENT CASE MANAGEMENT (OUTPATIENT)
Dept: ADMINISTRATIVE | Facility: OTHER | Age: 82
End: 2017-10-10

## 2017-10-10 NOTE — PROGRESS NOTES
10/10/17 - Phone contact made with pt today for follow up and d/c from O[facility]. She reports she did not make contact with ALEXX Bolaños with the Pharmacy Patient Assistance Program on 10/6/17 because she cancelled her MRI. She states she is unable to pay the $200 copay to have the test completed. Advised CM will d/c from [facility] today and encouraged her to make contact with ALEXX Bolaños when she goes to scheduled appt on 10/16/17 at [facility]. She again denies having ever rec'd any applications, leandro Bolaños has mailed them twice to pt's address on her EMR, which CM confirmed today. CM will mail pt a letter instructing follow up with ALEXX Bolaños on 10/16/17 as well as the Pt Financial Services Dept and will d/c from [facility] today related to unable to complete goals with leandro walker contact resources have been repeatedly given to her Sravani Oconnell RN

## 2017-10-10 NOTE — LETTER
October 10, 2017    Charline Messer  [address] [place] [address]             [facility] [address] [place] [address] Dear Mrs Messer,     Please don't forget to make contact with Robert Bolaños in the Patient Pharmacy Assistance Program at the [facility] when you go there for your scheduled tests on 10/16/17. If you go to the pharmacy right behind the check in desk on the first floor, they can help you make contact with Robert.    There is also a person named Carrie Reese in the Patient Financial Services Office on the 2nd floor of [facility]. She can help you with determining whether you qualify for financial assistance with the cost of the treatment you receive at [facility]. In order to apply for financial assistance, you will need to bring information about your monthly income with you.     Thank you,       Sravani Oconnell RN  Outpatient Case Management  [phone]  Ext [phone]  [email or web address]

## 2017-10-12 NOTE — TELEPHONE ENCOUNTER
----- Message from Idalmis Moreau sent at 10/12/2017  2:53 PM CDT -----  Contact: Patient   1. What is the name of the medication you are requesting? Rx Hydrocodone  2. What is the dose? 5-325mg  3. How do you take the medication? Orally, topically, etc? oral  4. How often do you take this medication? As needed every four hours  5. Do you need a 30 day or 90 day supply? N/A  6. How many refills are you requesting? 1  7. What is your preferred pharmacy and location of the pharmacy?   [facility] [place] [facility] - [address]  [address]  [place] [address]  Phone: [phone] Fax: [phone]  8. Who can we contact with further questions? Patient/[phone]

## 2017-10-13 RX ORDER — HYDROCODONE BITARTRATE AND ACETAMINOPHEN 5; 325 MG/1; MG/1
1 TABLET ORAL EVERY 4 HOURS PRN
Qty: 16 TABLET | Refills: 0 | Status: CANCELLED | OUTPATIENT
Start: 2017-10-13

## 2017-10-16 ENCOUNTER — OFFICE VISIT (OUTPATIENT)
Dept: FAMILY MEDICINE | Facility: CLINIC | Age: 82
End: 2017-10-16
Payer: MEDICARE

## 2017-10-16 ENCOUNTER — HOSPITAL ENCOUNTER (OUTPATIENT)
Dept: RADIOLOGY | Facility: HOSPITAL | Age: 82
Discharge: HOME | End: 2017-10-16
Attending: FAMILY MEDICINE
Payer: MEDICARE

## 2017-10-16 VITALS
HEIGHT: 60 IN | HEART RATE: 79 BPM | RESPIRATION RATE: 16 BRPM | TEMPERATURE: 98 F | OXYGEN SATURATION: 97 % | WEIGHT: 149.25 LBS | BODY MASS INDEX: 29.3 KG/M2

## 2017-10-16 DIAGNOSIS — M54.32 SCIATICA OF LEFT SIDE: Primary | ICD-10-CM

## 2017-10-16 DIAGNOSIS — D17.24 LIPOMA OF LEFT LOWER EXTREMITY: ICD-10-CM

## 2017-10-16 DIAGNOSIS — Z76.0 MEDICATION REFILL: ICD-10-CM

## 2017-10-16 PROCEDURE — 99499 UNLISTED E&M SERVICE: CPT | Mod: S$GLB,,, | Performed by: FAMILY MEDICINE

## 2017-10-16 PROCEDURE — 96372 THER/PROPH/DIAG INJ SC/IM: CPT | Mod: S$GLB,,, | Performed by: FAMILY MEDICINE

## 2017-10-16 PROCEDURE — 76882 US LMTD JT/FCL EVL NVASC XTR: CPT | Mod: 26,,, | Performed by: RADIOLOGY

## 2017-10-16 PROCEDURE — 99999 PR PBB SHADOW E&M-EST. PATIENT-LVL III: CPT | Mod: PBBFAC,,, | Performed by: FAMILY MEDICINE

## 2017-10-16 PROCEDURE — 99214 OFFICE O/P EST MOD 30 MIN: CPT | Mod: 25,S$GLB,, | Performed by: FAMILY MEDICINE

## 2017-10-16 PROCEDURE — 76999 ECHO EXAMINATION PROCEDURE: CPT | Mod: TC,PO

## 2017-10-16 RX ORDER — BETAMETHASONE SODIUM PHOSPHATE AND BETAMETHASONE ACETATE 3; 3 MG/ML; MG/ML
12 INJECTION, SUSPENSION INTRA-ARTICULAR; INTRALESIONAL; INTRAMUSCULAR; SOFT TISSUE ONCE
Status: COMPLETED | OUTPATIENT
Start: 2017-10-16 | End: 2017-10-16

## 2017-10-16 RX ORDER — FUROSEMIDE 20 MG/1
TABLET ORAL
Qty: 30 TABLET | Refills: 0 | Status: ON HOLD | OUTPATIENT
Start: 2017-10-16 | End: 2018-10-12 | Stop reason: HOSPADM

## 2017-10-16 RX ORDER — GABAPENTIN 300 MG/1
CAPSULE ORAL
COMMUNITY
Start: 2017-09-25 | End: 2017-10-16 | Stop reason: SDUPTHER

## 2017-10-16 RX ORDER — OXYCODONE AND ACETAMINOPHEN 7.5; 325 MG/1; MG/1
TABLET ORAL
COMMUNITY
Start: 2017-09-25 | End: 2018-07-16

## 2017-10-16 RX ORDER — GABAPENTIN 300 MG/1
300 CAPSULE ORAL 2 TIMES DAILY
Qty: 30 CAPSULE | Refills: 0 | Status: SHIPPED | OUTPATIENT
Start: 2017-10-16 | End: 2018-02-05

## 2017-10-16 RX ORDER — HYDROCODONE BITARTRATE AND ACETAMINOPHEN 5; 325 MG/1; MG/1
1 TABLET ORAL EVERY 12 HOURS PRN
Qty: 20 TABLET | Refills: 0 | Status: SHIPPED | OUTPATIENT
Start: 2017-10-16 | End: 2018-07-16

## 2017-10-16 RX ADMIN — BETAMETHASONE SODIUM PHOSPHATE AND BETAMETHASONE ACETATE 12 MG: 3; 3 INJECTION, SUSPENSION INTRA-ARTICULAR; INTRALESIONAL; INTRAMUSCULAR; SOFT TISSUE at 08:10

## 2017-10-16 NOTE — PROGRESS NOTES
Subjective:       Patient ID: Charline Messer is a 82 y.o. female.    Chief Complaint: Hip Pain      HPI  Ms. Messer presents to clinic today for complaints of back pain.   She states it has been going on for 3 months.   She states she went to see a doctor - Dr. Marlon Messer and was given some pain pills.   She states she was told to get a MRI but has not had it yet. She states she has not been able to afford the co pay on the MRI.   The pain is in the buttocks and radiates down the left side.   She also states she has a mass on her left thigh.     Review of Systems   Constitutional: Negative for fever.   Respiratory: Negative for cough and shortness of breath.    Cardiovascular: Negative for chest pain.   Gastrointestinal: Negative for abdominal pain and vomiting.   Genitourinary: Negative for dysuria.   Neurological: Negative for numbness.       Medication List with Changes/Refills   Current Medications    ALBUTEROL (PROVENTIL) 2 MG TAB    Take 2 mg by mouth 3 (three) times daily.    ASPIRIN (ECOTRIN) 81 MG EC TABLET    Take 1 tablet by mouth Daily.    ATORVASTATIN (LIPITOR) 80 MG TABLET    Take 1 tablet (80 mg total) by mouth once daily.    CLOPIDOGREL (PLAVIX) 75 MG TABLET    Take 1 tablet (75 mg total) by mouth once daily.    COLCHICINE 0.6 MG TABLET    Take 1 pill daily prn    DICLOFENAC SODIUM (VOLTAREN) 1 % GEL    Apply 2 g topically 2 (two) times daily.    FLUTICASONE (FLONASE) 50 MCG/ACTUATION NASAL SPRAY    2 sprays by Each Nare route daily as needed for Rhinitis.    FUROSEMIDE (LASIX) 20 MG TABLET    Take 1 tablet by mouth Daily. prn    GABAPENTIN (NEURONTIN) 300 MG CAPSULE        HYDROCODONE-ACETAMINOPHEN 5-325MG (NORCO) 5-325 MG PER TABLET    Take 1 tablet by mouth every 4 (four) hours as needed for Pain.    ISOSORBIDE MONONITRATE (IMDUR) 30 MG 24 HR TABLET    Take 1 tablet (30 mg total) by mouth once daily.    METOPROLOL TARTRATE (LOPRESSOR) 25 MG TABLET    Take 1 tablet (25 mg total) by mouth 2 (two)  times daily.    OXYCODONE-ACETAMINOPHEN (PERCOCET) 7.5-325 MG PER TABLET        RANOLAZINE (RANEXA) 500 MG TB12    Take 1 tablet (500 mg total) by mouth 2 (two) times daily.       Patient Active Problem List   Diagnosis    HTN (hypertension)    Allergic rhinitis, cause unspecified    Hyperlipidemia    Generalized osteoarthrosis, involving multiple sites    Gout, arthritis    Hypothyroidism    Vitamin D insufficiency    PAD (peripheral artery disease)    Atherosclerosis of both carotid arteries    History of breast cancer    Thrombocytopenia    Obesity (BMI 30-39.9)    ST elevation myocardial infarction (STEMI) of anterior wall    AMI anterior wall    Coronary artery disease involving native coronary artery of native heart without angina pectoris         Objective:     Physical Exam   Constitutional: She is oriented to person, place, and time. She appears well-developed and well-nourished. No distress.   HENT:   Head: Normocephalic and atraumatic.   Right Ear: External ear normal.   Left Ear: External ear normal.   Eyes: EOM are normal. Right eye exhibits no discharge. Left eye exhibits no discharge.   Cardiovascular: Normal rate and regular rhythm.    Pulmonary/Chest: Effort normal and breath sounds normal. No respiratory distress. She has no wheezes.   Musculoskeletal: She exhibits no edema.   Lipoma like mass on left posterior thigh    Neurological: She is alert and oriented to person, place, and time.   Skin: Skin is warm and dry. She is not diaphoretic. No erythema.   Psychiatric: She has a normal mood and affect.   Vitals reviewed.    Vitals:    10/16/17 0815   Pulse: 79   Resp: 16   Temp: 97.6 °F (36.4 °C)       Assessment/  PLAN     Sciatica of left side  -     betamethasone acetate-betamethasone sodium phosphate injection 12 mg; Inject 2 mLs (12 mg total) into the muscle once.  -     hydrocodone-acetaminophen 5-325mg (NORCO) 5-325 mg per tablet; Take 1 tablet by mouth every 12 (twelve) hours  as needed for Pain.  Dispense: 20 tablet; Refill: 0  -     gabapentin (NEURONTIN) 300 MG capsule; Take 1 capsule (300 mg total) by mouth 2 (two) times daily.  Dispense: 30 capsule; Refill: 0  - encouraged mri   - follow up with pain management     Medication refill  -     furosemide (LASIX) 20 MG tablet; Take 1 tablet by mouth Daily. prn  Dispense: 30 tablet; Refill: 0    Lipoma of left lower extremity  -     US Soft Tissue Misc; Future; Expected date: 10/16/2017        Gopi Wade MD  [facility]

## 2017-10-16 NOTE — PATIENT INSTRUCTIONS
Understanding Lumbar Radiculopathy    Lumbar radiculopathy is irritation or inflammation of a nerve root in the low back. It causes symptoms that spread out from the back down one or both legs. To understand this condition, it helps to understand the parts of the spine:  · Vertebrae. These are bones that stack to form the spine. The lumbar spine contains the 5 bottom vertebrae.  · Disks. These are soft pads of tissue between the vertebrae. They act as shock absorbers for the spine.  · Spinal canal. This is a tunnel formed within the stacked vertebrae. In the lumbar spine, nerves run through this canal.  · Nerves. These branch off and leave the spinal canal, traveling out to parts of the body. As they leave the spinal canal, nerves pass through openings between the vertebrae. The nerve root is the part of the nerve that is closest to the spinal canal.  · Sciatic nerve. This is a large nerve formed from several nerve roots in the low back. This nerve extends down the back of the leg to the foot.  With lumbar radiculopathy, nerve roots in the low back become irritated. This leads to pain and symptoms. The sciatic nerve is commonly involved, so the condition is often called sciatica.  What causes lumbar radiculopathy?  Aging, injury, poor posture, extra body weight, and other issues can lead to problems in the low back. These problems may then irritate nerve roots. They include:  · Damage to a disk in the lumbar spine. The damaged disk may then press on nearby nerve roots.  · Degeneration from wear and tear, and aging. This can lead to narrowing (stenosis) of the openings between the vertebrae. The narrowed openings press on nerve roots as they leave the spinal canal.  · Unstable spine. This is when a vertebra slips forward. It can then press on a nerve root.  Other, less common things can put pressure on nerves in the low back. These include diabetes, infection, or a tumor.  Symptoms of lumbar radiculopathy  These  include:  · Pain in the low back  · Pain, numbness, tingling, or weakness that travels into the buttocks, hip, groin, or leg  · Muscle spasms  Treatment for lumbar radiculopathy  In most cases, your healthcare provider will first try treatments that help relieve symptoms. These may include:  · Prescription and over-the-counter pain medicines. These help relieve pain, swelling, and irritation.  · Limits on positions and activities that increase pain. But lying in bed or avoiding all movement is only recommended for a short period of time.  · Physical therapy, including exercises and stretches. This helps decrease pain and increase movement and function.  · Steroid shots into the lower back. This may help relieve symptoms for a time.  · Weight-loss program. If you are overweight, losing extra pounds may help relieve symptoms.  In some cases, you may need surgery to fix the underlying problem. This depends on the cause, the symptoms, and how long the pain has lasted.  Possible complications  Over time, an irritated and inflamed nerve may become damaged. This may lead to long-lasting (permanent) numbness or weakness in your legs and feet. If symptoms change suddenly or get worse, be sure to let your healthcare provider know.  When to call your healthcare provider  Call your healthcare provider right away if you have any of these:  · New pain or pain that gets worse  · New or increasing weakness, tingling, or numbness in your leg or foot  · Problems controlling your bladder or bowel   Date Last Reviewed: 3/10/2016  © [id number] [employer]. [address] [place] [address], [place] [address]. All rights reserved. This information is not intended as a substitute for professional medical care. Always follow your healthcare professional's instructions.

## 2017-11-01 NOTE — ED NOTES
4CC MARCAINE   NDC#-[id number]  LOT#- [id number]  EXP: 1/2019    4CC LIDOCAINE  [facility]#-[id number]  LOT#- [id number]  EXP: 3/2019    2CC DEPO  NDC#-[id number]  LOT#- [id number]  EXP: 1/2020    SITE: RIGHT KNEE
Pt requesting pain medication; Dr. Raymundo notified.   
(PROTONIX) 40 MG tablet, , Disp: , Rfl:     lisinopril (PRINIVIL;ZESTRIL) 20 MG tablet, Take 20 mg by mouth nightly., Disp: , Rfl:     rosuvastatin (CRESTOR) 10 MG tablet, Take 10 mg by mouth nightly., Disp: , Rfl:   Allergies:  Percocet [oxycodone-acetaminophen]  Social History:    reports that she has been smoking E-Cigarettes. She has been smoking about 0.00 packs per day. She has never used smokeless tobacco. She reports that she drinks about 3.6 oz of alcohol per week . She reports that she does not use drugs. Family History:   Family History   Problem Relation Age of Onset    Diabetes Mother     High Cholesterol Mother     Heart Disease Father     High Blood Pressure Father     Diabetes Brother        REVIEW OF SYSTEMS:   For new problems, a full review of systems will be found scanned in the patient's chart. CONSTITUTIONAL: Denies unexplained weight loss, fevers, chills   NEUROLOGICAL: Denies unsteady gait or progressive weakness  SKIN: Denies skin changes, delayed healing, rash, itching       PHYSICAL EXAM:    Vitals: Blood pressure (!) 147/89, pulse 83, height 4' 11.06\" (1.5 m), weight 164 lb 14.5 oz (74.8 kg). GENERAL EXAM:  · General Apparence: Patient is adequately groomed with no evidence of malnutrition. · Orientation: The patient is oriented to time, place and person. · Mood & Affect:The patient's mood and affect are appropriate       Right knee PHYSICAL EXAMINATION:  · Inspection:  Trace effusion. No obvious deformity of the mild varus alignment. · Palpation:  Tender medially and posterior medially and to a lesser degree laterally. · Range of Motion: normal    · Strength: no focal weakness    · Special Tests:  Negative Homans sign. No gross ligament this instability. · Skin:  There are no rashes, ulcerations or lesions.     · Gait & station: mildly antalgic favoring the right      · Additional Examinations:        Left Lower Extremity: Examination of the left lower

## 2017-11-20 ENCOUNTER — PATIENT OUTREACH (OUTPATIENT)
Dept: ADMINISTRATIVE | Facility: HOSPITAL | Age: 82
End: 2017-11-20

## 2018-02-02 ENCOUNTER — TELEPHONE (OUTPATIENT)
Dept: RESEARCH | Facility: HOSPITAL | Age: 83
End: 2018-02-02

## 2018-02-05 ENCOUNTER — OFFICE VISIT (OUTPATIENT)
Dept: INTERNAL MEDICINE | Facility: CLINIC | Age: 83
End: 2018-02-05
Payer: MEDICARE

## 2018-02-05 ENCOUNTER — OFFICE VISIT (OUTPATIENT)
Dept: CARDIOLOGY | Facility: CLINIC | Age: 83
End: 2018-02-05
Payer: MEDICARE

## 2018-02-05 VITALS
HEIGHT: 61 IN | SYSTOLIC BLOOD PRESSURE: 158 MMHG | WEIGHT: 152.31 LBS | DIASTOLIC BLOOD PRESSURE: 78 MMHG | OXYGEN SATURATION: 98 % | BODY MASS INDEX: 28.76 KG/M2 | HEART RATE: 73 BPM

## 2018-02-05 VITALS
DIASTOLIC BLOOD PRESSURE: 86 MMHG | WEIGHT: 151.69 LBS | HEART RATE: 80 BPM | TEMPERATURE: 99 F | OXYGEN SATURATION: 98 % | BODY MASS INDEX: 29.78 KG/M2 | SYSTOLIC BLOOD PRESSURE: 162 MMHG | HEIGHT: 60 IN

## 2018-02-05 DIAGNOSIS — I21.09 ST ELEVATION MYOCARDIAL INFARCTION (STEMI) OF ANTERIOR WALL: ICD-10-CM

## 2018-02-05 DIAGNOSIS — Z85.3 HISTORY OF BREAST CANCER: ICD-10-CM

## 2018-02-05 DIAGNOSIS — D69.6 THROMBOCYTOPENIA: ICD-10-CM

## 2018-02-05 DIAGNOSIS — E55.9 VITAMIN D INSUFFICIENCY: ICD-10-CM

## 2018-02-05 DIAGNOSIS — I10 ESSENTIAL HYPERTENSION: Chronic | ICD-10-CM

## 2018-02-05 DIAGNOSIS — E78.5 HYPERLIPIDEMIA, UNSPECIFIED HYPERLIPIDEMIA TYPE: ICD-10-CM

## 2018-02-05 DIAGNOSIS — M15.9 GENERALIZED OSTEOARTHROSIS, INVOLVING MULTIPLE SITES: ICD-10-CM

## 2018-02-05 DIAGNOSIS — M10.9 GOUT, ARTHRITIS: ICD-10-CM

## 2018-02-05 DIAGNOSIS — I73.9 PAD (PERIPHERAL ARTERY DISEASE): Chronic | ICD-10-CM

## 2018-02-05 DIAGNOSIS — I65.23 ATHEROSCLEROSIS OF BOTH CAROTID ARTERIES: ICD-10-CM

## 2018-02-05 DIAGNOSIS — I25.10 CORONARY ARTERY DISEASE INVOLVING NATIVE CORONARY ARTERY OF NATIVE HEART WITHOUT ANGINA PECTORIS: Primary | ICD-10-CM

## 2018-02-05 DIAGNOSIS — E03.9 HYPOTHYROIDISM, UNSPECIFIED TYPE: Chronic | ICD-10-CM

## 2018-02-05 DIAGNOSIS — I25.10 CORONARY ARTERY DISEASE INVOLVING NATIVE CORONARY ARTERY OF NATIVE HEART WITHOUT ANGINA PECTORIS: ICD-10-CM

## 2018-02-05 DIAGNOSIS — Z00.00 ENCOUNTER FOR PREVENTIVE HEALTH EXAMINATION: Primary | ICD-10-CM

## 2018-02-05 PROCEDURE — G0439 PPPS, SUBSEQ VISIT: HCPCS | Mod: S$GLB,,, | Performed by: NURSE PRACTITIONER

## 2018-02-05 PROCEDURE — 99999 PR PBB SHADOW E&M-EST. PATIENT-LVL III: CPT | Mod: PBBFAC,,, | Performed by: NURSE PRACTITIONER

## 2018-02-05 PROCEDURE — 99499 UNLISTED E&M SERVICE: CPT | Mod: S$GLB,,, | Performed by: NURSE PRACTITIONER

## 2018-02-05 PROCEDURE — 1159F MED LIST DOCD IN RCRD: CPT | Mod: S$GLB,,, | Performed by: NURSE PRACTITIONER

## 2018-02-05 PROCEDURE — 99999 PR PBB SHADOW E&M-EST. PATIENT-LVL IV: CPT | Mod: PBBFAC,,, | Performed by: NURSE PRACTITIONER

## 2018-02-05 PROCEDURE — 3008F BODY MASS INDEX DOCD: CPT | Mod: S$GLB,,, | Performed by: NURSE PRACTITIONER

## 2018-02-05 PROCEDURE — 99214 OFFICE O/P EST MOD 30 MIN: CPT | Mod: S$GLB,,, | Performed by: NURSE PRACTITIONER

## 2018-02-05 RX ORDER — METOPROLOL TARTRATE 50 MG/1
50 TABLET ORAL 2 TIMES DAILY
Qty: 60 TABLET | Refills: 11 | Status: SHIPPED | OUTPATIENT
Start: 2018-02-05 | End: 2019-02-05

## 2018-02-05 RX ORDER — COLCHICINE 0.6 MG/1
TABLET ORAL
Qty: 30 TABLET | Refills: 1 | Status: ON HOLD | OUTPATIENT
Start: 2018-02-05 | End: 2018-10-12 | Stop reason: HOSPADM

## 2018-02-05 RX ORDER — METHOCARBAMOL 500 MG/1
500 TABLET, FILM COATED ORAL 3 TIMES DAILY
Qty: 30 TABLET | Refills: 0 | Status: SHIPPED | OUTPATIENT
Start: 2018-02-05 | End: 2018-02-15

## 2018-02-05 NOTE — PROGRESS NOTES
Subjective:   Patient ID:  Charline Messer is a 82 y.o. female who presents for follow up of Hypertension and Coronary Artery Disease      HPI      Patient presents to clinic for follow up and management of CAD, HTN and HLP. She states that she has been doing well since last visit. She has not had any hospital admissions for cardiac related events within the last year. Patient has angiogram in June 2017 that showed nonobstructive CAD. She denies any chest pain, sob, orthopnea, PND, dizziness, near syncope or syncope. Has no palpitations or dizziness, near syncope or syncope. Has no CNS complaints to suggest TIA or CVA. Does complain of [place] today. BP elevated today in clinic. Has been running on the higher side recently. Has no abnormal bleeding on dual antiplatelets. No longer having back pain issues.     Past Medical History:   Diagnosis Date    Allergic rhinitis, cause unspecified     Arthritis     Asthma     as a child    Benign colonic polyp     Breast cancer mid 1980s    right; per patient s/p right mastectomy and chemo, unsure about radiation    Coronary artery disease     Coronary artery disease involving native coronary artery of native heart without angina pectoris 8/2/2017    Depression     Diverticular disease     External hemorrhoids     Gout attack     Hyperlipidemia     Hypertension     Hypothyroidism     Obesity (BMI 30-39.9) 10/24/2016    Osteoarthritis     Peripheral vascular disease     Postmenopausal     Pulmonary embolism     reported per patient; unsure date    ST elevation myocardial infarction (STEMI) of anterior wall 6/27/2017    Thrombocytopenia     Tinnitus aurium     Vitamin D deficiency disease        Past Surgical History:   Procedure Laterality Date    CARDIAC CATHETERIZATION      PARTIAL HYSTERECTOMY      Due to fibroids    Right mastectomy         Social History   Substance Use Topics    Smoking status: Never Smoker    Smokeless tobacco: Never Used    Alcohol  use No       Family History   Problem Relation Age of Onset    Heart disease Mother     Hypertension Mother     Stroke Mother     Hypertension Father     Heart disease Sister     No Known Problems Son     Cancer Neg Hx     Diabetes Neg Hx     Kidney disease Neg Hx        Current Outpatient Prescriptions   Medication Sig    albuterol (PROVENTIL) 2 MG Tab Take 2 mg by mouth 3 (three) times daily.    aspirin (ECOTRIN) 81 MG EC tablet Take 1 tablet by mouth Daily.    atorvastatin (LIPITOR) 80 MG tablet Take 1 tablet (80 mg total) by mouth once daily.    clopidogrel (PLAVIX) 75 mg tablet Take 1 tablet (75 mg total) by mouth once daily.    colchicine 0.6 mg tablet Take 1 pill daily prn    fluticasone (FLONASE) 50 mcg/actuation nasal spray 2 sprays by Each Nare route daily as needed for Rhinitis.    furosemide (LASIX) 20 MG tablet Take 1 tablet by mouth Daily. prn    isosorbide mononitrate (IMDUR) 30 MG 24 hr tablet Take 1 tablet (30 mg total) by mouth once daily.    metoprolol tartrate (LOPRESSOR) 50 MG tablet Take 1 tablet (50 mg total) by mouth 2 (two) times daily.    oxycodone-acetaminophen (PERCOCET) 7.5-325 mg per tablet     hydrocodone-acetaminophen 5-325mg (NORCO) 5-325 mg per tablet Take 1 tablet by mouth every 12 (twelve) hours as needed for Pain.    methocarbamol (ROBAXIN) 500 MG Tab Take 1 tablet (500 mg total) by mouth 3 (three) times daily.     Current Facility-Administered Medications   Medication    lidocaine-EPINEPHrine 1%-1:100,000 injection 5 mL     Current Outpatient Prescriptions on File Prior to Visit   Medication Sig    albuterol (PROVENTIL) 2 MG Tab Take 2 mg by mouth 3 (three) times daily.    aspirin (ECOTRIN) 81 MG EC tablet Take 1 tablet by mouth Daily.    atorvastatin (LIPITOR) 80 MG tablet Take 1 tablet (80 mg total) by mouth once daily.    clopidogrel (PLAVIX) 75 mg tablet Take 1 tablet (75 mg total) by mouth once daily.    fluticasone (FLONASE) 50 mcg/actuation  nasal spray 2 sprays by Each Nare route daily as needed for Rhinitis.    furosemide (LASIX) 20 MG tablet Take 1 tablet by mouth Daily. prn    isosorbide mononitrate (IMDUR) 30 MG 24 hr tablet Take 1 tablet (30 mg total) by mouth once daily.    oxycodone-acetaminophen (PERCOCET) 7.5-325 mg per tablet     [DISCONTINUED] metoprolol tartrate (LOPRESSOR) 25 MG tablet Take 1 tablet (25 mg total) by mouth 2 (two) times daily.    hydrocodone-acetaminophen 5-325mg (NORCO) 5-325 mg per tablet Take 1 tablet by mouth every 12 (twelve) hours as needed for Pain.    [DISCONTINUED] colchicine 0.6 mg tablet Take 1 pill daily prn    [DISCONTINUED] diclofenac sodium (VOLTAREN) 1 % Gel Apply 2 g topically 2 (two) times daily.    [DISCONTINUED] gabapentin (NEURONTIN) 300 MG capsule Take 1 capsule (300 mg total) by mouth 2 (two) times daily.    [DISCONTINUED] ranolazine (RANEXA) 500 MG Tb12 Take 1 tablet (500 mg total) by mouth 2 (two) times daily.     Current Facility-Administered Medications on File Prior to Visit   Medication    lidocaine-EPINEPHrine 1%-1:100,000 injection 5 mL       Review of Systems   Constitution: Negative for diaphoresis, weakness, malaise/fatigue, weight gain and weight loss.   HENT: Negative for congestion and nosebleeds.    Cardiovascular: Negative for chest pain, claudication, cyanosis, dyspnea on exertion, irregular heartbeat, leg swelling, near-syncope, orthopnea, palpitations, paroxysmal nocturnal dyspnea and syncope.   Respiratory: Negative for cough, hemoptysis, shortness of breath, sleep disturbances due to breathing, snoring, sputum production and wheezing.    Hematologic/Lymphatic: Negative for bleeding problem. Does not bruise/bleed easily.   Skin: Negative for rash.   Musculoskeletal: Negative for arthritis, back pain, falls, joint pain, muscle cramps and muscle weakness.   Gastrointestinal: Negative for abdominal pain, constipation, diarrhea, heartburn, hematemesis, hematochezia, melena  "and nausea.   Genitourinary: Negative for dysuria, hematuria and nocturia.   Neurological: Positive for headaches. Negative for excessive daytime sleepiness, dizziness, light-headedness, loss of balance, numbness and vertigo.       Objective:   Physical Exam   Constitutional: She is oriented to person, place, and time. She appears well-developed and well-nourished.   Neck: Neck supple. No JVD present.   Cardiovascular: Normal rate, regular rhythm, normal heart sounds and normal pulses.  Exam reveals no friction rub.    No murmur heard.  Pulmonary/Chest: Effort normal and breath sounds normal. No respiratory distress. She has no wheezes. She has no rales.   Abdominal: Soft. Bowel sounds are normal. She exhibits no distension.   Musculoskeletal: She exhibits no edema or tenderness.   Neurological: She is alert and oriented to person, place, and time.   Skin: Skin is warm and dry. No rash noted.   Psychiatric: She has a normal mood and affect. Her behavior is normal.   Nursing note and vitals reviewed.    Vitals:    02/05/18 0839 02/05/18 0904   BP: (!) 188/90 (!) 158/78   BP Location: Left arm    Patient Position: Sitting    BP Method: Medium (Manual)    Pulse: 73    SpO2: 98%    Weight: 69.1 kg (152 lb 5.4 oz)    Height: 5' 1" (1.549 m)      Lab Results   Component Value Date    CHOL 217 (H) 06/27/2017    CHOL 239 (H) 01/19/2016    CHOL 224 (H) 10/31/2014     Lab Results   Component Value Date    HDL 66 06/27/2017    HDL 72 01/19/2016    HDL 77 (H) 10/31/2014     Lab Results   Component Value Date    LDLCALC 135.8 06/27/2017    LDLCALC 149.2 01/19/2016    LDLCALC 133.8 10/31/2014     Lab Results   Component Value Date    TRIG 76 06/27/2017    TRIG 89 01/19/2016    TRIG 66 10/31/2014     Lab Results   Component Value Date    CHOLHDL 30.4 06/27/2017    CHOLHDL 30.1 01/19/2016    CHOLHDL 34.4 10/31/2014       Chemistry        Component Value Date/Time     07/03/2017 1730    K 3.9 07/03/2017 1730     " 07/03/2017 1730    CO2 25 07/03/2017 1730    BUN 11 07/03/2017 1730    CREATININE 1.0 07/03/2017 1730    GLU 93 07/03/2017 1730        Component Value Date/Time    CALCIUM 9.4 07/03/2017 1730    ALKPHOS 125 07/03/2017 1730    AST 27 07/03/2017 1730    ALT 33 07/03/2017 1730    BILITOT 0.8 07/03/2017 1730    ESTGFRAFRICA >60 07/03/2017 1730    EGFRNONAA 53 (A) 07/03/2017 1730          Lab Results   Component Value Date    TSH 3.784 01/19/2016     Lab Results   Component Value Date    INR 1.0 06/27/2017    INR 1.0 09/11/2014    INR 1.1 01/11/2013     Lab Results   Component Value Date    WBC 7.93 07/03/2017    HGB 12.7 07/03/2017    HCT 39.1 07/03/2017    MCV 88 07/03/2017     (L) 07/03/2017     BMP  Sodium   Date Value Ref Range Status   07/03/2017 143 136 - 145 mmol/L Final     Potassium   Date Value Ref Range Status   07/03/2017 3.9 3.5 - 5.1 mmol/L Final     Chloride   Date Value Ref Range Status   07/03/2017 106 95 - 110 mmol/L Final     CO2   Date Value Ref Range Status   07/03/2017 25 23 - 29 mmol/L Final     BUN, Bld   Date Value Ref Range Status   07/03/2017 11 8 - 23 mg/dL Final     Creatinine   Date Value Ref Range Status   07/03/2017 1.0 0.5 - 1.4 mg/dL Final     Calcium   Date Value Ref Range Status   07/03/2017 9.4 8.7 - 10.5 mg/dL Final     Anion Gap   Date Value Ref Range Status   07/03/2017 12 8 - 16 mmol/L Final     eGFR if    Date Value Ref Range Status   07/03/2017 >60 >60 mL/min/1.73 m^2 Final     eGFR if non    Date Value Ref Range Status   07/03/2017 53 (A) >60 mL/min/1.73 m^2 Final     Comment:     Calculation used to obtain the estimated glomerular filtration  rate (eGFR) is the CKD-EPI equation. Since race is unknown   in our information system, the eGFR values for   -American and Non--American patients are given   for each creatinine result.       CrCl cannot be calculated (Patient's most recent lab result is older than the maximum 7 days  allowed.).    Assessment:     1. Coronary artery disease involving native coronary artery of native heart without angina pectoris    2. Essential hypertension    3. PAD (peripheral artery disease)    4. Hyperlipidemia, unspecified hyperlipidemia type    5. Atherosclerosis of both carotid arteries    6. ST elevation myocardial infarction (STEMI) of anterior wall      BP not at target  Stable CAD-no angina or equivalent  Stable PAD-no claudication   Lipids being managed by PCP  No CNS complaints to suggest TIA or CVA    Plan:     Increase metoprolol to 50mg BID   Keep BP log and RTC in 1 month for BP check   Heart healthy diet  Exercise program

## 2018-02-05 NOTE — PROGRESS NOTES
Charline Messer presented for a  Medicare AWV and comprehensive Health Risk Assessment today. The following components were reviewed and updated:    · Medical history  · Family History  · Social history  · Allergies and Current Medications  · Health Risk Assessment  · Health Maintenance  · Care Team     ** See Completed Assessments for Annual Wellness Visit within the encounter summary.**       The following assessments were completed:  · Living Situation  · CAGE  · Depression Screening  · Timed Get Up and Go  · Whisper Test  · Cognitive Function Screening  · Nutrition Screening  · ADL Screening  · PAQ Screening    Vitals:    02/05/18 0745   BP: (!) 162/86   BP Location: Left arm   Patient Position: Sitting   BP Method: Medium (Manual)   Pulse: 80   Temp: 98.6 °F (37 °C)   TempSrc: Tympanic   SpO2: 98%   Weight: 68.8 kg (151 lb 10.8 oz)   Height: 5' (1.524 m)     Body mass index is 29.62 kg/m².  Physical Exam   Constitutional: She is oriented to person, place, and time. Vital signs are normal. She appears well-developed and well-nourished.   HENT:   Head: Normocephalic and atraumatic.   Neck: Normal range of motion.   Cardiovascular: Normal rate and regular rhythm.    Pulmonary/Chest: Effort normal and breath sounds normal.   Musculoskeletal: Normal range of motion.   Neurological: She is alert and oriented to person, place, and time.   Skin: Skin is warm.   Psychiatric: She has a normal mood and affect. Her behavior is normal.             Diagnoses and health risks identified today and associated recommendations/orders:    1. Encounter for preventive health examination      2. Essential hypertension  Stable.  Will continue medications management.  Follow up per PCP.    3. PAD (peripheral artery disease)  Stable. EVER 10/15: The right ankle brachial index was 0.89 which suggests mild right lower extremity arterial disease.   The left ankle brachial index was 0.86 which suggests mild left lower extremity arterial disease.    The right TBI is 0.38.   The left TBI is 0.35.   Follow up per PCP.    4. Hyperlipidemia, unspecified hyperlipidemia type  Stable.  Will continue Atorvastatin 80 mg as prescribed.  Last lipid panel 06/2017.  Will continue treatment plan per PCP.    5. Atherosclerosis of both carotid arteries  Stable.  Noted on CT Head 08/2014.  Will continue hyperlipidemia management.  Follow up per PCP.     6. ST elevation myocardial infarction (STEMI) of anterior wall  STEMI 06/2017.  Heart cath by Dr. Messer- Non-obstructive CAD.  Symptoms stable.  Will continue treatment plan per cardiologist.       7. Coronary artery disease involving native coronary artery of native heart without angina pectoris  Stable.  Non-obstructive CAD identified after heart cath 06/2017.    8. Thrombocytopenia  Stable.  Last platelet count noted on CBC 07/2017 125 K/ul.  Continue treatment plan per PCP.       9. History of breast cancer  Right mastectomy.  Left breast mammogram 01/2016-negative for malignancy.     10. Vitamin D insufficiency  Not currently taking Vitamin D supplements.  Due for Vitamin D level.     11. Generalized osteoarthrosis, involving multiple sites  Stable.  Has intermittent pain.  Requested refill on Robaxin.    - methocarbamol (ROBAXIN) 500 MG Tab; Take 1 tablet (500 mg total) by mouth 3 (three) times daily.  Dispense: 30 tablet; Refill: 0    12. Gout, arthritis  Stable with intermittent symptoms.    - colchicine 0.6 mg tablet; Take 1 pill daily prn  Dispense: 30 tablet; Refill: 1    13. Hypothyroidism, unspecified type  Stable.  Normal TSH in 2016.  Not currently on medication management.        Provided Charline with a 5-10 year written screening schedule and personal prevention plan. Recommendations were developed using the USPSTF age appropriate recommendations. Education, counseling, and referrals were provided as needed. After Visit Summary printed and given to patient which includes a list of additional screenings\tests  needed.    No Follow-up on file.    Carina Wade NP

## 2018-02-05 NOTE — PATIENT INSTRUCTIONS
Counseling and Referral of Other Preventative  (Italic type indicates deductible and co-insurance are waived)    Patient Name: Charline Messer  Today's Date: 2/5/2018    Health Maintenance       Date Due Completion Date    Pneumococcal (65+) (1 of 2 - PCV13) 03/22/2002 ---    DEXA SCAN 12/15/2013 12/15/2010 (Done)    Override on 12/15/2010: Done    Influenza Vaccine 08/01/2017 10/24/2016    TETANUS VACCINE 01/28/2021 1/28/2011    Lipid Panel 06/27/2022 6/27/2017        No orders of the defined types were placed in this encounter.    The following information is provided to all patients.  This information is to help you find resources for any of the problems found today that may be affecting your health:                Living healthy guide: www.[facility].[place].gov      Understanding Diabetes: www.diabetes.org      Eating healthy: www.cdc.gov/healthyweight      CDC home safety checklist: www.cdc.gov/steadi/patient.html      Agency on Aging: www.goea.[place].[facility]      Alcoholics anonymous (AA): www.aa.org      Physical Activity: www.macario.nih.gov/wd6fowq      Tobacco use: www.quitwithusla.org

## 2018-07-16 ENCOUNTER — OFFICE VISIT (OUTPATIENT)
Dept: INTERNAL MEDICINE | Facility: CLINIC | Age: 83
End: 2018-07-16
Payer: MEDICARE

## 2018-07-16 VITALS
HEIGHT: 61 IN | SYSTOLIC BLOOD PRESSURE: 126 MMHG | WEIGHT: 145.5 LBS | HEART RATE: 82 BPM | OXYGEN SATURATION: 97 % | TEMPERATURE: 97 F | DIASTOLIC BLOOD PRESSURE: 72 MMHG | BODY MASS INDEX: 27.47 KG/M2

## 2018-07-16 DIAGNOSIS — I10 ESSENTIAL HYPERTENSION: Chronic | ICD-10-CM

## 2018-07-16 DIAGNOSIS — Z78.0 ASYMPTOMATIC MENOPAUSAL STATE: ICD-10-CM

## 2018-07-16 DIAGNOSIS — Z00.00 ROUTINE GENERAL MEDICAL EXAMINATION AT A HEALTH CARE FACILITY: Primary | ICD-10-CM

## 2018-07-16 DIAGNOSIS — D69.6 THROMBOCYTOPENIA: ICD-10-CM

## 2018-07-16 DIAGNOSIS — Z23 NEED FOR PNEUMOCOCCAL VACCINATION: ICD-10-CM

## 2018-07-16 DIAGNOSIS — M54.2 NECK PAIN: ICD-10-CM

## 2018-07-16 DIAGNOSIS — I73.9 PAD (PERIPHERAL ARTERY DISEASE): Chronic | ICD-10-CM

## 2018-07-16 DIAGNOSIS — I25.10 CORONARY ARTERY DISEASE INVOLVING NATIVE CORONARY ARTERY OF NATIVE HEART WITHOUT ANGINA PECTORIS: ICD-10-CM

## 2018-07-16 DIAGNOSIS — E78.5 HYPERLIPIDEMIA, UNSPECIFIED HYPERLIPIDEMIA TYPE: ICD-10-CM

## 2018-07-16 DIAGNOSIS — I65.23 ATHEROSCLEROSIS OF BOTH CAROTID ARTERIES: ICD-10-CM

## 2018-07-16 DIAGNOSIS — E03.9 HYPOTHYROIDISM, UNSPECIFIED TYPE: Chronic | ICD-10-CM

## 2018-07-16 PROCEDURE — 3074F SYST BP LT 130 MM HG: CPT | Mod: CPTII,S$GLB,, | Performed by: INTERNAL MEDICINE

## 2018-07-16 PROCEDURE — 3078F DIAST BP <80 MM HG: CPT | Mod: CPTII,S$GLB,, | Performed by: INTERNAL MEDICINE

## 2018-07-16 PROCEDURE — 99999 PR PBB SHADOW E&M-EST. PATIENT-LVL III: CPT | Mod: PBBFAC,,, | Performed by: INTERNAL MEDICINE

## 2018-07-16 PROCEDURE — 99213 OFFICE O/P EST LOW 20 MIN: CPT | Mod: 25,S$GLB,, | Performed by: INTERNAL MEDICINE

## 2018-07-16 PROCEDURE — 99397 PER PM REEVAL EST PAT 65+ YR: CPT | Mod: S$GLB,,, | Performed by: INTERNAL MEDICINE

## 2018-07-16 RX ORDER — CLOPIDOGREL BISULFATE 75 MG/1
TABLET ORAL
Qty: 30 TABLET | Refills: 6 | Status: SHIPPED | OUTPATIENT
Start: 2018-07-16

## 2018-07-16 RX ORDER — ISOSORBIDE MONONITRATE 30 MG/1
TABLET, EXTENDED RELEASE ORAL
Qty: 30 TABLET | Refills: 6 | Status: SHIPPED | OUTPATIENT
Start: 2018-07-16

## 2018-07-16 RX ORDER — CYCLOBENZAPRINE HCL 10 MG
TABLET ORAL
Qty: 30 TABLET | Refills: 0 | Status: ON HOLD | OUTPATIENT
Start: 2018-07-16 | End: 2018-10-12 | Stop reason: HOSPADM

## 2018-07-16 RX ORDER — ALBUTEROL SULFATE 90 UG/1
1-2 AEROSOL, METERED RESPIRATORY (INHALATION) EVERY 6 HOURS PRN
COMMUNITY

## 2018-07-16 RX ORDER — ATORVASTATIN CALCIUM 80 MG/1
TABLET, FILM COATED ORAL
Qty: 30 TABLET | Refills: 6 | Status: SHIPPED | OUTPATIENT
Start: 2018-07-16

## 2018-07-16 NOTE — PROGRESS NOTES
Subjective:      Patient ID: Charline Messer is a 83 y.o. female.    Chief Complaint: Establish Care    84 yo with Patient Active Problem List:     HTN (hypertension)     Allergic rhinitis, cause unspecified     Hyperlipidemia     Generalized osteoarthrosis, involving multiple sites     Gout, arthritis     Hypothyroidism     Vitamin D insufficiency     PAD (peripheral artery disease)     Atherosclerosis of both carotid arteries     History of breast cancer     Thrombocytopenia     Obesity (BMI 30-39.9)     ST elevation myocardial infarction (STEMI) of anterior wall     AMI anterior wall     Coronary artery disease involving native coronary artery of native heart without angina pectoris    Past Medical History:  No date: Allergic rhinitis, cause unspecified  No date: Arthritis  No date: Asthma      Comment: as a child  No date: Benign colonic polyp  mid 1980s: Breast cancer      Comment: right; per patient s/p right mastectomy and                chemo, unsure about radiation  No date: Coronary artery disease  8/2/2017: Coronary artery disease involving native coron*  No date: Depression  No date: Diverticular disease  No date: External hemorrhoids  No date: Gout attack  No date: Hyperlipidemia  No date: Hypertension  No date: Hypothyroidism  10/24/2016: Obesity (BMI 30-39.9)  No date: Osteoarthritis  No date: Peripheral vascular disease  No date: Postmenopausal  No date: Pulmonary embolism      Comment: reported per patient; unsure date  6/27/2017: ST elevation myocardial infarction (STEMI) of *  No date: Thrombocytopenia  No date: Tinnitus aurium  No date: Vitamin D deficiency disease    Here today for annual prevent exam and c/o neck pain.  Compliant with meds without significant side effects. Energy and appetite are good.     Neck Pain    This is a new problem. The current episode started more than 1 month ago. The problem occurs constantly. The problem has been waxing and waning. The pain is associated with  "nothing. The pain is present in the right side and left side. The quality of the pain is described as aching and cramping. The pain is moderate. The symptoms are aggravated by twisting. The pain is same all the time. Stiffness is present in the morning. Pertinent negatives include no chest pain, fever, headaches, leg pain, numbness, paresis, syncope, tingling, visual change or weakness.     Review of Systems   Constitutional: Negative for chills and fever.   Respiratory: Negative for cough, shortness of breath and wheezing.    Cardiovascular: Negative for chest pain, palpitations and syncope.   Gastrointestinal: Negative for abdominal pain.   Musculoskeletal: Positive for neck pain.   Skin: Negative for rash and wound.   Neurological: Negative for tingling, weakness, numbness and headaches.     Objective:   /72 (BP Location: Right arm, Patient Position: Sitting)   Pulse 82   Temp 97.3 °F (36.3 °C) (Tympanic)   Ht 5' 1" (1.549 m)   Wt 66 kg (145 lb 8.1 oz)   SpO2 97%   BMI 27.49 kg/m²     Physical Exam   Constitutional: She is oriented to person, place, and time. She appears well-developed and well-nourished. No distress.   HENT:   Head: Normocephalic and atraumatic.   Mouth/Throat: Oropharynx is clear and moist.   Eyes: EOM are normal. Pupils are equal, round, and reactive to light.   Neck: Neck supple. Muscular tenderness present. No spinous process tenderness present. No neck rigidity. Decreased range of motion (due to pain) present. No edema present. No thyromegaly present.       Cardiovascular: Normal rate and regular rhythm.    Pulmonary/Chest: Breath sounds normal. She has no wheezes. She has no rales.   Abdominal: Soft. Bowel sounds are normal. There is no tenderness.   Musculoskeletal: She exhibits no edema.   Lymphadenopathy:     She has no cervical adenopathy.   Neurological: She is alert and oriented to person, place, and time.   Skin: Skin is warm and dry.   Psychiatric: She has a normal mood " and affect. Her behavior is normal.       Assessment:     1. Routine general medical examination at a health care facility    2. Essential hypertension    3. PAD (peripheral artery disease)    4. Hyperlipidemia, unspecified hyperlipidemia type    5. Atherosclerosis of both carotid arteries    6. Coronary artery disease involving native coronary artery of native heart without angina pectoris    7. Thrombocytopenia    8. Hypothyroidism, unspecified type    9. Asymptomatic menopausal state    10. Need for pneumococcal vaccination    11. Neck pain      Plan:   Routine general medical examination at a health care facility    Essential hypertension  -     Comprehensive metabolic panel; Future; Expected date: 07/16/2018  -     CBC auto differential; Future; Expected date: 07/16/2018  -     TSH; Future; Expected date: 07/16/2018    PAD (peripheral artery disease)    Hyperlipidemia, unspecified hyperlipidemia type  -     Lipid panel; Future; Expected date: 07/16/2018    Atherosclerosis of both carotid arteries    Coronary artery disease involving native coronary artery of native heart without angina pectoris    Thrombocytopenia    Hypothyroidism, unspecified type    Asymptomatic menopausal state  -     DXA Bone Density Spine And Hip; Future; Expected date: 07/16/2018    Need for pneumococcal vaccination  -     Pneumococcal Conjugate Vaccine (13 Valent) (IM)    Neck pain  -     cyclobenzaprine (FLEXERIL) 10 MG tablet; 1/2 to one tab po qhs prn muscle spasm  Dispense: 30 tablet; Refill: 0    Tylenol 500 to 1000 mg every 8 hours as needed for pain.       Lab Frequency Next Occurrence   Mammo Digital Screening Bilat with CAD Once 07/11/2017   Comprehensive metabolic panel Once 07/16/2018   CBC auto differential Once 07/16/2018   TSH Once 07/16/2018   Lipid panel Once 07/16/2018   DXA Bone Density Spine And Hip Once 07/16/2018       Problem List Items Addressed This Visit        Cardiac/Vascular    Hyperlipidemia    Relevant  Orders    Lipid panel    Atherosclerosis of both carotid arteries    Overview      CT of  Head  8/ 29/ 14 .Vascular calcification of the carotid arteries is noted         Coronary artery disease involving native coronary artery of native heart without angina pectoris    HTN (hypertension) (Chronic)    Relevant Orders    Comprehensive metabolic panel    CBC auto differential    TSH    PAD (peripheral artery disease) (Chronic)    Overview     EVER 10/14/2015---Resting EVER:  The right ankle brachial index was 0.89 which suggests mild right lower extremity arterial disease.   The left ankle brachial index was 0.86 which suggests mild left lower extremity arterial disease.   The right TBI is 0.38.   The left TBI is 0.35.             Hematology    Thrombocytopenia       Endocrine    Hypothyroidism (Chronic)      Other Visit Diagnoses     Routine general medical examination at a health care facility    -  Primary    Asymptomatic menopausal state        Relevant Orders    DXA Bone Density Spine And Hip    Need for pneumococcal vaccination        Relevant Orders    Pneumococcal Conjugate Vaccine (13 Valent) (IM)    Neck pain        Relevant Medications    cyclobenzaprine (FLEXERIL) 10 MG tablet          Follow-up in about 6 months (around 1/16/2019), or if symptoms worsen or fail to improve.

## 2018-07-31 ENCOUNTER — APPOINTMENT (OUTPATIENT)
Dept: RADIOLOGY | Facility: CLINIC | Age: 83
End: 2018-07-31
Attending: INTERNAL MEDICINE
Payer: MEDICARE

## 2018-07-31 ENCOUNTER — CLINICAL SUPPORT (OUTPATIENT)
Dept: INTERNAL MEDICINE | Facility: CLINIC | Age: 83
End: 2018-07-31
Payer: MEDICARE

## 2018-07-31 DIAGNOSIS — Z78.0 ASYMPTOMATIC MENOPAUSAL STATE: ICD-10-CM

## 2018-07-31 PROCEDURE — 99999 PR PBB SHADOW E&M-EST. PATIENT-LVL II: CPT | Mod: PBBFAC,,,

## 2018-07-31 PROCEDURE — 77080 DXA BONE DENSITY AXIAL: CPT | Mod: 26,,, | Performed by: RADIOLOGY

## 2018-07-31 PROCEDURE — 90670 PCV13 VACCINE IM: CPT | Mod: S$GLB,,, | Performed by: INTERNAL MEDICINE

## 2018-07-31 PROCEDURE — G0009 ADMIN PNEUMOCOCCAL VACCINE: HCPCS | Mod: S$GLB,,, | Performed by: INTERNAL MEDICINE

## 2018-07-31 PROCEDURE — 77080 DXA BONE DENSITY AXIAL: CPT | Mod: TC,PO

## 2018-07-31 NOTE — PROGRESS NOTES
Patient here for pneumococcal injection.  After verifying patient's allergies and medications, patient received PCV-13 vaccine to left deltoid.  Instructed patient to remain in lobby for 15 minutes following injection and to immediately report any adverse reactions.  Patient verbalized understanding.

## 2018-09-21 ENCOUNTER — HOSPITAL ENCOUNTER (EMERGENCY)
Facility: HOSPITAL | Age: 83
Discharge: HOME | End: 2018-09-21
Attending: EMERGENCY MEDICINE
Payer: MEDICARE

## 2018-09-21 VITALS
RESPIRATION RATE: 18 BRPM | BODY MASS INDEX: 28.42 KG/M2 | HEART RATE: 98 BPM | TEMPERATURE: 99 F | SYSTOLIC BLOOD PRESSURE: 181 MMHG | OXYGEN SATURATION: 96 % | DIASTOLIC BLOOD PRESSURE: 73 MMHG | HEIGHT: 60 IN

## 2018-09-21 DIAGNOSIS — I10 HYPERTENSION, UNSPECIFIED TYPE: ICD-10-CM

## 2018-09-21 DIAGNOSIS — M10.9 ACUTE GOUT OF RIGHT ANKLE, UNSPECIFIED CAUSE: Primary | ICD-10-CM

## 2018-09-21 PROCEDURE — 96372 THER/PROPH/DIAG INJ SC/IM: CPT

## 2018-09-21 PROCEDURE — 63600175 PHARM REV CODE 636 W HCPCS: Performed by: PHYSICIAN ASSISTANT

## 2018-09-21 PROCEDURE — 99283 EMERGENCY DEPT VISIT LOW MDM: CPT | Mod: 25

## 2018-09-21 RX ORDER — TRAMADOL HYDROCHLORIDE 100 MG/1
100 TABLET, EXTENDED RELEASE ORAL DAILY
Qty: 10 TABLET | Refills: 0 | Status: SHIPPED | OUTPATIENT
Start: 2018-09-21 | End: 2018-10-01

## 2018-09-21 RX ORDER — MEPERIDINE HYDROCHLORIDE 50 MG/ML
25 INJECTION INTRAMUSCULAR; INTRAVENOUS; SUBCUTANEOUS
Status: COMPLETED | OUTPATIENT
Start: 2018-09-21 | End: 2018-09-21

## 2018-09-21 RX ORDER — PROMETHAZINE HYDROCHLORIDE 25 MG/ML
25 INJECTION, SOLUTION INTRAMUSCULAR; INTRAVENOUS
Status: COMPLETED | OUTPATIENT
Start: 2018-09-21 | End: 2018-09-21

## 2018-09-21 RX ORDER — PREDNISONE 20 MG/1
40 TABLET ORAL
Status: COMPLETED | OUTPATIENT
Start: 2018-09-21 | End: 2018-09-21

## 2018-09-21 RX ORDER — PREDNISONE 20 MG/1
20 TABLET ORAL DAILY
Qty: 10 TABLET | Refills: 0 | Status: SHIPPED | OUTPATIENT
Start: 2018-09-21 | End: 2018-10-01

## 2018-09-21 RX ADMIN — PREDNISONE 40 MG: 20 TABLET ORAL at 09:09

## 2018-09-21 RX ADMIN — MEPERIDINE HYDROCHLORIDE 25 MG: 50 INJECTION INTRAMUSCULAR; INTRAVENOUS; SUBCUTANEOUS at 09:09

## 2018-09-21 RX ADMIN — PROMETHAZINE HYDROCHLORIDE 25 MG: 25 INJECTION INTRAMUSCULAR; INTRAVENOUS at 09:09

## 2018-09-21 NOTE — ED NOTES
Patient is in wheelchair.  will drive patient home and understands she is unable to drive for 4 hours due to Phenergan. Demerol and Prednisone given as well. Patient moved back to [facility] after medication is given.

## 2018-09-21 NOTE — ED PROVIDER NOTES
"Encounter Date: 9/21/2018       History     Chief Complaint   Patient presents with    Gout     "I have gout in my right foot and the medication is not helping"     Pt c/o acute gout flare to right ankle.  Pt states that the right ankle flares up every 6 months.  Her colchicine has not completely controlled her pain.      The history is provided by the patient.   Leg Pain    There was no injury mechanism. The incident occurred several days ago. The pain is present in the right ankle. The quality of the pain is described as aching and throbbing. The pain is at a severity of 6/10. The pain has been constant since onset. Pertinent negatives include no numbness, no inability to bear weight, no loss of motion, no muscle weakness, no loss of sensation and no tingling. She reports no foreign bodies present. The symptoms are aggravated by activity, bearing weight and palpation. Treatments tried: colchicine. The treatment provided mild relief.     Review of patient's allergies indicates:   Allergen Reactions    Codeine Nausea And Vomiting    Ibuprofen Hives and Itching           Zetia [ezetimibe] Nausea And Vomiting     Past Medical History:   Diagnosis Date    Allergic rhinitis, cause unspecified     Arthritis     Benign colonic polyp     Breast cancer mid 1980s    right; per patient s/p right mastectomy and chemo, unsure about radiation    CHF (congestive heart failure)     Coronary artery disease     Coronary artery disease involving native coronary artery of native heart without angina pectoris 8/2/2017    Depression     Diverticular disease     External hemorrhoids     Gout attack     Hyperlipidemia     Hypertension     Hypothyroidism     Obesity (BMI 30-39.9) 10/24/2016    Osteoarthritis     Peripheral vascular disease     Postmenopausal     Pulmonary embolism     reported per patient; unsure date    ST elevation myocardial infarction (STEMI) of anterior wall 6/27/2017    Thrombocytopenia     " Tinnitus aurium     Vitamin D deficiency disease      Past Surgical History:   Procedure Laterality Date    BREAST SURGERY      CARDIAC CATHETERIZATION      HEART CATH-LEFT Left 6/27/2017    [facility]    PARTIAL HYSTERECTOMY      Due to fibroids    Right mastectomy       Family History   Problem Relation Age of Onset    Heart disease Mother     Hypertension Mother     Stroke Mother     Hypertension Father     Heart disease Sister     No Known Problems Son     Cancer Neg Hx     Diabetes Neg Hx     Kidney disease Neg Hx      Social History     Tobacco Use    Smoking status: Never Smoker    Smokeless tobacco: Never Used   Substance Use Topics    Alcohol use: Yes     Comment: ocassionally    Drug use: No     Review of Systems   Constitutional: Negative for chills and fever.   HENT: Negative for sore throat.    Eyes: Negative for photophobia and redness.   Respiratory: Negative for cough and shortness of breath.    Cardiovascular: Negative for chest pain.   Gastrointestinal: Negative for abdominal pain, diarrhea and nausea.   Endocrine: Negative for polydipsia and polyphagia.   Genitourinary: Negative for dysuria.   Musculoskeletal: Positive for arthralgias (right ankle pain). Negative for back pain and myalgias.   Skin: Negative for rash.   Neurological: Negative for tingling, weakness, numbness and headaches.   Hematological: Does not bruise/bleed easily.   Psychiatric/Behavioral: The patient is not nervous/anxious.    All other systems reviewed and are negative.      Physical Exam     Initial Vitals [09/21/18 0852]   BP Pulse Resp Temp SpO2   (!) 197/78 93 18 98.8 °F (37.1 °C) 97 %      MAP       --         Physical Exam    Nursing note and vitals reviewed.  Constitutional: Vital signs are normal. She appears well-developed and well-nourished. No distress.   HENT:   Head: Normocephalic and atraumatic.   Right Ear: External ear normal.   Left Ear: External ear  normal.   Nose: Nose normal.   Mouth/Throat: Oropharynx is clear and moist.   Eyes: Conjunctivae, EOM and lids are normal. Pupils are equal, round, and reactive to light.   Neck: Normal range of motion and full passive range of motion without pain. Neck supple.   Cardiovascular: Normal rate, regular rhythm, S1 normal, S2 normal, normal heart sounds, intact distal pulses and normal pulses.   Pulmonary/Chest: Breath sounds normal. No respiratory distress. She has no wheezes. She has no rales.   Abdominal: Soft. Normal appearance and bowel sounds are normal. She exhibits no distension. There is no tenderness.   Musculoskeletal:        Right ankle: She exhibits decreased range of motion and swelling. She exhibits no ecchymosis, no deformity, no laceration and normal pulse. Tenderness. Lateral malleolus and medial malleolus tenderness found. No AITFL, no CF ligament, no posterior TFL, no head of 5th metatarsal and no proximal fibula tenderness found. Achilles tendon normal.   Lymphadenopathy:     She has no cervical adenopathy.   Neurological: She is alert and oriented to person, place, and time. She has normal strength. No cranial nerve deficit or sensory deficit. Coordination and gait normal.   Skin: Skin is warm, dry and intact.   Psychiatric: She has a normal mood and affect. Her speech is normal and behavior is normal. Judgment and thought content normal. Cognition and memory are normal.         ED Course   Procedures  Labs Reviewed - No data to display       Imaging Results    None                               Clinical Impression:   The primary encounter diagnosis was Acute gout of right ankle, unspecified cause. A diagnosis of Hypertension, unspecified type was also pertinent to this visit.      Disposition:   Disposition: Discharged  Condition: Stable                        LAKSHMI Salazar  09/21/18 1001

## 2018-10-07 ENCOUNTER — HOSPITAL ENCOUNTER (INPATIENT)
Facility: HOSPITAL | Age: 83
LOS: 5 days | Discharge: ANOTHER HOSPITAL | DRG: 296 | End: 2018-10-12
Attending: FAMILY MEDICINE | Admitting: INTERNAL MEDICINE
Payer: MEDICARE

## 2018-10-07 DIAGNOSIS — G93.1 HYPOXIC ENCEPHALOPATHY: ICD-10-CM

## 2018-10-07 DIAGNOSIS — E03.9 HYPOTHYROIDISM, UNSPECIFIED TYPE: Chronic | ICD-10-CM

## 2018-10-07 DIAGNOSIS — E87.20 METABOLIC ACIDOSIS: ICD-10-CM

## 2018-10-07 DIAGNOSIS — N17.9 AKI (ACUTE KIDNEY INJURY): ICD-10-CM

## 2018-10-07 DIAGNOSIS — Z96.89 CHEST TUBE IN PLACE: ICD-10-CM

## 2018-10-07 DIAGNOSIS — I73.9 PAD (PERIPHERAL ARTERY DISEASE): Chronic | ICD-10-CM

## 2018-10-07 DIAGNOSIS — I46.9 CARDIAC ARREST: Primary | ICD-10-CM

## 2018-10-07 DIAGNOSIS — D64.9 ANEMIA, UNSPECIFIED TYPE: ICD-10-CM

## 2018-10-07 DIAGNOSIS — S22.41XA TRAUMATIC FRACTURE OF RIBS WITH PNEUMOTHORAX, RIGHT, CLOSED, INITIAL ENCOUNTER: ICD-10-CM

## 2018-10-07 DIAGNOSIS — J96.01 ACUTE HYPOXEMIC RESPIRATORY FAILURE: ICD-10-CM

## 2018-10-07 DIAGNOSIS — S27.0XXA TRAUMATIC FRACTURE OF RIBS WITH PNEUMOTHORAX, RIGHT, CLOSED, INITIAL ENCOUNTER: ICD-10-CM

## 2018-10-07 DIAGNOSIS — R06.02 SOB (SHORTNESS OF BREATH): ICD-10-CM

## 2018-10-07 DIAGNOSIS — R73.9 HYPERGLYCEMIA, UNSPECIFIED: ICD-10-CM

## 2018-10-07 DIAGNOSIS — R56.9: ICD-10-CM

## 2018-10-07 DIAGNOSIS — G93.1 ANOXIC BRAIN INJURY: ICD-10-CM

## 2018-10-07 DIAGNOSIS — S22.41XA CLOSED FRACTURE OF MULTIPLE RIBS OF RIGHT SIDE, INITIAL ENCOUNTER: ICD-10-CM

## 2018-10-07 DIAGNOSIS — I25.10 CORONARY ARTERY DISEASE INVOLVING NATIVE CORONARY ARTERY OF NATIVE HEART WITHOUT ANGINA PECTORIS: Chronic | ICD-10-CM

## 2018-10-07 DIAGNOSIS — I10 ESSENTIAL HYPERTENSION: Chronic | ICD-10-CM

## 2018-10-07 DIAGNOSIS — Z45.2 ENCOUNTER FOR CENTRAL LINE PLACEMENT: ICD-10-CM

## 2018-10-07 DIAGNOSIS — J93.83 LOCULATED RIGHT LATERAL PNEUMOTHORAX: ICD-10-CM

## 2018-10-07 LAB
ALBUMIN SERPL BCP-MCNC: 3.5 G/DL
ALLENS TEST: ABNORMAL
ALP SERPL-CCNC: 144 U/L
ALT SERPL W/O P-5'-P-CCNC: 59 U/L
AMPHET+METHAMPHET UR QL: NEGATIVE
ANION GAP SERPL CALC-SCNC: 10 MMOL/L
ANION GAP SERPL CALC-SCNC: 12 MMOL/L
ANION GAP SERPL CALC-SCNC: 15 MMOL/L
ANION GAP SERPL CALC-SCNC: 24 MMOL/L
APTT BLDCRRT: 27.1 SEC
AST SERPL-CCNC: 105 U/L
BACTERIA #/AREA URNS HPF: ABNORMAL /HPF
BARBITURATES UR QL SCN>200 NG/ML: NEGATIVE
BASOPHILS # BLD AUTO: 0 K/UL
BASOPHILS # BLD AUTO: 0.01 K/UL
BASOPHILS # BLD AUTO: 0.03 K/UL
BASOPHILS NFR BLD: 0 %
BASOPHILS NFR BLD: 0.1 %
BASOPHILS NFR BLD: 0.3 %
BENZODIAZ UR QL SCN>200 NG/ML: NEGATIVE
BILIRUB SERPL-MCNC: 0.4 MG/DL
BILIRUB UR QL STRIP: NEGATIVE
BNP SERPL-MCNC: 14 PG/ML
BUN SERPL-MCNC: 17 MG/DL
BUN SERPL-MCNC: 21 MG/DL
BUN SERPL-MCNC: 21 MG/DL
BUN SERPL-MCNC: 22 MG/DL
BZE UR QL SCN: NEGATIVE
CALCIUM SERPL-MCNC: 7.1 MG/DL
CALCIUM SERPL-MCNC: 7.2 MG/DL
CALCIUM SERPL-MCNC: 7.2 MG/DL
CALCIUM SERPL-MCNC: 9 MG/DL
CANNABINOIDS UR QL SCN: NEGATIVE
CHLORIDE SERPL-SCNC: 100 MMOL/L
CHLORIDE SERPL-SCNC: 108 MMOL/L
CHLORIDE SERPL-SCNC: 110 MMOL/L
CHLORIDE SERPL-SCNC: 111 MMOL/L
CK SERPL-CCNC: 286 U/L
CLARITY UR: CLEAR
CO2 SERPL-SCNC: 13 MMOL/L
CO2 SERPL-SCNC: 13 MMOL/L
CO2 SERPL-SCNC: 15 MMOL/L
CO2 SERPL-SCNC: 16 MMOL/L
COLOR UR: YELLOW
CREAT SERPL-MCNC: 0.8 MG/DL
CREAT SERPL-MCNC: 0.8 MG/DL
CREAT SERPL-MCNC: 0.9 MG/DL
CREAT SERPL-MCNC: 1.6 MG/DL
CREAT UR-MCNC: 36.1 MG/DL
D DIMER PPP IA.FEU-MCNC: NORMAL MG/L FEU
DELSYS: ABNORMAL
DIFFERENTIAL METHOD: ABNORMAL
EOSINOPHIL # BLD AUTO: 0 K/UL
EOSINOPHIL # BLD AUTO: 0.2 K/UL
EOSINOPHIL # BLD AUTO: 0.5 K/UL
EOSINOPHIL NFR BLD: 0.1 %
EOSINOPHIL NFR BLD: 1.3 %
EOSINOPHIL NFR BLD: 4.8 %
ERYTHROCYTE [DISTWIDTH] IN BLOOD BY AUTOMATED COUNT: 13.9 %
ERYTHROCYTE [DISTWIDTH] IN BLOOD BY AUTOMATED COUNT: 14.2 %
ERYTHROCYTE [DISTWIDTH] IN BLOOD BY AUTOMATED COUNT: 14.2 %
ERYTHROCYTE [SEDIMENTATION RATE] IN BLOOD BY WESTERGREN METHOD: 16 MM/H
ERYTHROCYTE [SEDIMENTATION RATE] IN BLOOD BY WESTERGREN METHOD: 20 MM/H
ERYTHROCYTE [SEDIMENTATION RATE] IN BLOOD BY WESTERGREN METHOD: 22 MM/H
ERYTHROCYTE [SEDIMENTATION RATE] IN BLOOD BY WESTERGREN METHOD: 22 MM/H
ERYTHROCYTE [SEDIMENTATION RATE] IN BLOOD BY WESTERGREN METHOD: 25 MM/H
EST. GFR  (AFRICAN AMERICAN): 35 ML/MIN/1.73 M^2
EST. GFR  (AFRICAN AMERICAN): >60 ML/MIN/1.73 M^2
EST. GFR  (NON AFRICAN AMERICAN): 30 ML/MIN/1.73 M^2
EST. GFR  (NON AFRICAN AMERICAN): >60 ML/MIN/1.73 M^2
ESTIMATED PA SYSTOLIC PRESSURE: 45.77
FIO2: 100
FIO2: 35
FIO2: 35
FIO2: 50
FIO2: 60
GLUCOSE SERPL-MCNC: 121 MG/DL
GLUCOSE SERPL-MCNC: 134 MG/DL
GLUCOSE SERPL-MCNC: 134 MG/DL
GLUCOSE SERPL-MCNC: 152 MG/DL
GLUCOSE SERPL-MCNC: 190 MG/DL
GLUCOSE SERPL-MCNC: 260 MG/DL
GLUCOSE SERPL-MCNC: 267 MG/DL
GLUCOSE SERPL-MCNC: 298 MG/DL
GLUCOSE UR QL STRIP: ABNORMAL
HCO3 UR-SCNC: 16.2 MMOL/L (ref 24–28)
HCO3 UR-SCNC: 17.5 MMOL/L (ref 24–28)
HCO3 UR-SCNC: 17.7 MMOL/L (ref 24–28)
HCO3 UR-SCNC: 19.7 MMOL/L (ref 24–28)
HCO3 UR-SCNC: 20.3 MMOL/L (ref 24–28)
HCT VFR BLD AUTO: 33.3 %
HCT VFR BLD AUTO: 35.4 %
HCT VFR BLD AUTO: 43 %
HGB BLD-MCNC: 10.6 G/DL
HGB BLD-MCNC: 11.5 G/DL
HGB BLD-MCNC: 12.8 G/DL
HGB UR QL STRIP: ABNORMAL
HYALINE CASTS #/AREA URNS LPF: 1 /LPF
INR PPP: 1.3
INR PPP: 1.3
IP: 23
IP: 25
IT: 0.7
IT: 0.71
KETONES UR QL STRIP: NEGATIVE
LACTATE SERPL-SCNC: 2.7 MMOL/L
LACTATE SERPL-SCNC: 3.2 MMOL/L
LACTATE SERPL-SCNC: >12 MMOL/L
LEUKOCYTE ESTERASE UR QL STRIP: NEGATIVE
LYMPHOCYTES # BLD AUTO: 0.5 K/UL
LYMPHOCYTES # BLD AUTO: 0.8 K/UL
LYMPHOCYTES # BLD AUTO: 4.9 K/UL
LYMPHOCYTES NFR BLD: 2.7 %
LYMPHOCYTES NFR BLD: 5.6 %
LYMPHOCYTES NFR BLD: 50.8 %
MAGNESIUM SERPL-MCNC: 1.4 MG/DL
MAGNESIUM SERPL-MCNC: 1.6 MG/DL
MAGNESIUM SERPL-MCNC: 1.7 MG/DL
MAGNESIUM SERPL-MCNC: 2.8 MG/DL
MCH RBC QN AUTO: 28 PG
MCH RBC QN AUTO: 28.3 PG
MCH RBC QN AUTO: 28.4 PG
MCHC RBC AUTO-ENTMCNC: 29.8 G/DL
MCHC RBC AUTO-ENTMCNC: 31.8 G/DL
MCHC RBC AUTO-ENTMCNC: 32.5 G/DL
MCV RBC AUTO: 87 FL
MCV RBC AUTO: 88 FL
MCV RBC AUTO: 95 FL
METHADONE UR QL SCN>300 NG/ML: NEGATIVE
MICROSCOPIC COMMENT: ABNORMAL
MITRAL VALVE MOBILITY: NORMAL
MODE: ABNORMAL
MONOCYTES # BLD AUTO: 0.4 K/UL
MONOCYTES # BLD AUTO: 0.5 K/UL
MONOCYTES # BLD AUTO: 0.5 K/UL
MONOCYTES NFR BLD: 3.1 %
MONOCYTES NFR BLD: 3.1 %
MONOCYTES NFR BLD: 3.8 %
NEUTROPHILS # BLD AUTO: 13.2 K/UL
NEUTROPHILS # BLD AUTO: 16.1 K/UL
NEUTROPHILS # BLD AUTO: 3.9 K/UL
NEUTROPHILS NFR BLD: 40.3 %
NEUTROPHILS NFR BLD: 89.9 %
NEUTROPHILS NFR BLD: 94.1 %
NITRITE UR QL STRIP: NEGATIVE
OPIATES UR QL SCN: NEGATIVE
PCO2 BLDA: 27.5 MMHG (ref 35–45)
PCO2 BLDA: 34.6 MMHG (ref 35–45)
PCO2 BLDA: 36.7 MMHG (ref 35–45)
PCO2 BLDA: 45.5 MMHG (ref 35–45)
PCO2 BLDA: 58.5 MMHG (ref 35–45)
PCP UR QL SCN>25 NG/ML: NEGATIVE
PEEP: 5
PH SMN: 7.15 [PH] (ref 7.35–7.45)
PH SMN: 7.25 [PH] (ref 7.35–7.45)
PH SMN: 7.29 [PH] (ref 7.35–7.45)
PH SMN: 7.32 [PH] (ref 7.35–7.45)
PH SMN: 7.38 [PH] (ref 7.35–7.45)
PH UR STRIP: 7 [PH] (ref 5–8)
PHOSPHATE SERPL-MCNC: 2.8 MG/DL
PHOSPHATE SERPL-MCNC: 3 MG/DL
PHOSPHATE SERPL-MCNC: 3.3 MG/DL
PHOSPHATE SERPL-MCNC: 3.4 MG/DL
PIP: 28
PLATELET # BLD AUTO: 115 K/UL
PLATELET # BLD AUTO: 125 K/UL
PLATELET # BLD AUTO: 140 K/UL
PMV BLD AUTO: 12 FL
PMV BLD AUTO: 12.8 FL
PMV BLD AUTO: 13.2 FL
PO2 BLDA: 155 MMHG (ref 80–100)
PO2 BLDA: 157 MMHG (ref 80–100)
PO2 BLDA: 215 MMHG (ref 80–100)
PO2 BLDA: 290 MMHG (ref 80–100)
PO2 BLDA: 619 MMHG (ref 80–100)
POC BE: -8 MMOL/L
POC BE: -8 MMOL/L
POC BE: -9 MMOL/L
POC SATURATED O2: 100 % (ref 95–100)
POC SATURATED O2: 99 % (ref 95–100)
POC SATURATED O2: 99 % (ref 95–100)
POCT GLUCOSE: 109 MG/DL (ref 70–110)
POCT GLUCOSE: 140 MG/DL (ref 70–110)
POTASSIUM SERPL-SCNC: 3.6 MMOL/L
POTASSIUM SERPL-SCNC: 4 MMOL/L
POTASSIUM SERPL-SCNC: 4 MMOL/L
POTASSIUM SERPL-SCNC: 4.1 MMOL/L
PROCALCITONIN SERPL IA-MCNC: 0.03 NG/ML
PROT SERPL-MCNC: 7 G/DL
PROT UR QL STRIP: ABNORMAL
PROTHROMBIN TIME: 13.4 SEC
PROTHROMBIN TIME: 13.4 SEC
RBC # BLD AUTO: 3.78 M/UL
RBC # BLD AUTO: 4.07 M/UL
RBC # BLD AUTO: 4.51 M/UL
RBC #/AREA URNS HPF: 10 /HPF (ref 0–4)
RETIRED EF AND QEF - SEE NOTES: 55 (ref 55–65)
SAMPLE: ABNORMAL
SITE: ABNORMAL
SODIUM SERPL-SCNC: 136 MMOL/L
SODIUM SERPL-SCNC: 137 MMOL/L
SP GR UR STRIP: 1.02 (ref 1–1.03)
T3FREE SERPL-MCNC: 2.2 PG/ML
T4 FREE SERPL-MCNC: 0.95 NG/DL
TOXICOLOGY INFORMATION: NORMAL
TRICUSPID VALVE REGURGITATION: ABNORMAL
TROPONIN I SERPL DL<=0.01 NG/ML-MCNC: 0.05 NG/ML
TROPONIN I SERPL DL<=0.01 NG/ML-MCNC: 0.06 NG/ML
TROPONIN I SERPL DL<=0.01 NG/ML-MCNC: <0.006 NG/ML
TSH SERPL DL<=0.005 MIU/L-ACNC: 3.1 UIU/ML
URN SPEC COLLECT METH UR: ABNORMAL
UROBILINOGEN UR STRIP-ACNC: 1 EU/DL
VT: 380
WBC # BLD AUTO: 14.72 K/UL
WBC # BLD AUTO: 17.15 K/UL
WBC # BLD AUTO: 9.62 K/UL
WBC #/AREA URNS HPF: 1 /HPF (ref 0–5)

## 2018-10-07 PROCEDURE — 37799 UNLISTED PX VASCULAR SURGERY: CPT

## 2018-10-07 PROCEDURE — 82550 ASSAY OF CK (CPK): CPT

## 2018-10-07 PROCEDURE — 80053 COMPREHEN METABOLIC PANEL: CPT

## 2018-10-07 PROCEDURE — 36620 INSERTION CATHETER ARTERY: CPT

## 2018-10-07 PROCEDURE — 87070 CULTURE OTHR SPECIMN AEROBIC: CPT

## 2018-10-07 PROCEDURE — 84439 ASSAY OF FREE THYROXINE: CPT

## 2018-10-07 PROCEDURE — 27200966 HC CLOSED SUCTION SYSTEM

## 2018-10-07 PROCEDURE — 02HV33Z INSERTION OF INFUSION DEVICE INTO SUPERIOR VENA CAVA, PERCUTANEOUS APPROACH: ICD-10-PCS | Performed by: FAMILY MEDICINE

## 2018-10-07 PROCEDURE — 85610 PROTHROMBIN TIME: CPT

## 2018-10-07 PROCEDURE — 96360 HYDRATION IV INFUSION INIT: CPT | Mod: 59

## 2018-10-07 PROCEDURE — 36556 INSERT NON-TUNNEL CV CATH: CPT

## 2018-10-07 PROCEDURE — 32551 INSERTION OF CHEST TUBE: CPT

## 2018-10-07 PROCEDURE — 0W993ZZ DRAINAGE OF RIGHT PLEURAL CAVITY, PERCUTANEOUS APPROACH: ICD-10-PCS | Performed by: FAMILY MEDICINE

## 2018-10-07 PROCEDURE — 81000 URINALYSIS NONAUTO W/SCOPE: CPT | Mod: 59

## 2018-10-07 PROCEDURE — 99291 CRITICAL CARE FIRST HOUR: CPT

## 2018-10-07 PROCEDURE — 83735 ASSAY OF MAGNESIUM: CPT

## 2018-10-07 PROCEDURE — 94002 VENT MGMT INPAT INIT DAY: CPT

## 2018-10-07 PROCEDURE — 25000003 PHARM REV CODE 250: Performed by: FAMILY MEDICINE

## 2018-10-07 PROCEDURE — 82803 BLOOD GASES ANY COMBINATION: CPT

## 2018-10-07 PROCEDURE — 84481 FREE ASSAY (FT-3): CPT

## 2018-10-07 PROCEDURE — 83605 ASSAY OF LACTIC ACID: CPT | Mod: 91

## 2018-10-07 PROCEDURE — 96361 HYDRATE IV INFUSION ADD-ON: CPT | Mod: 59

## 2018-10-07 PROCEDURE — S0028 INJECTION, FAMOTIDINE, 20 MG: HCPCS | Performed by: NURSE PRACTITIONER

## 2018-10-07 PROCEDURE — 84100 ASSAY OF PHOSPHORUS: CPT | Mod: 91

## 2018-10-07 PROCEDURE — 96365 THER/PROPH/DIAG IV INF INIT: CPT | Mod: 59

## 2018-10-07 PROCEDURE — 36600 WITHDRAWAL OF ARTERIAL BLOOD: CPT

## 2018-10-07 PROCEDURE — 36415 COLL VENOUS BLD VENIPUNCTURE: CPT

## 2018-10-07 PROCEDURE — 85379 FIBRIN DEGRADATION QUANT: CPT

## 2018-10-07 PROCEDURE — 31500 INSERT EMERGENCY AIRWAY: CPT

## 2018-10-07 PROCEDURE — 83735 ASSAY OF MAGNESIUM: CPT | Mod: 91

## 2018-10-07 PROCEDURE — 87040 BLOOD CULTURE FOR BACTERIA: CPT

## 2018-10-07 PROCEDURE — 96366 THER/PROPH/DIAG IV INF ADDON: CPT | Mod: 59

## 2018-10-07 PROCEDURE — 93306 TTE W/DOPPLER COMPLETE: CPT

## 2018-10-07 PROCEDURE — 99291 CRITICAL CARE FIRST HOUR: CPT | Mod: 25,,, | Performed by: NURSE PRACTITIONER

## 2018-10-07 PROCEDURE — 84484 ASSAY OF TROPONIN QUANT: CPT | Mod: 91

## 2018-10-07 PROCEDURE — 96375 TX/PRO/DX INJ NEW DRUG ADDON: CPT | Mod: 59

## 2018-10-07 PROCEDURE — 94003 VENT MGMT INPAT SUBQ DAY: CPT

## 2018-10-07 PROCEDURE — 82947 ASSAY GLUCOSE BLOOD QUANT: CPT

## 2018-10-07 PROCEDURE — 93010 ELECTROCARDIOGRAM REPORT: CPT | Mod: 76,,, | Performed by: INTERNAL MEDICINE

## 2018-10-07 PROCEDURE — 82947 ASSAY GLUCOSE BLOOD QUANT: CPT | Mod: 91

## 2018-10-07 PROCEDURE — 93010 ELECTROCARDIOGRAM REPORT: CPT | Mod: ,,, | Performed by: INTERNAL MEDICINE

## 2018-10-07 PROCEDURE — 63600175 PHARM REV CODE 636 W HCPCS

## 2018-10-07 PROCEDURE — 96368 THER/DIAG CONCURRENT INF: CPT | Mod: 59

## 2018-10-07 PROCEDURE — 85025 COMPLETE CBC W/AUTO DIFF WBC: CPT

## 2018-10-07 PROCEDURE — 93306 TTE W/DOPPLER COMPLETE: CPT | Mod: 26,,, | Performed by: INTERNAL MEDICINE

## 2018-10-07 PROCEDURE — 87205 SMEAR GRAM STAIN: CPT

## 2018-10-07 PROCEDURE — 63600175 PHARM REV CODE 636 W HCPCS: Performed by: FAMILY MEDICINE

## 2018-10-07 PROCEDURE — 36556 INSERT NON-TUNNEL CV CATH: CPT | Mod: ,,, | Performed by: NURSE PRACTITIONER

## 2018-10-07 PROCEDURE — 99199 UNLISTED SPECIAL SVC PX/RPRT: CPT

## 2018-10-07 PROCEDURE — 25000003 PHARM REV CODE 250: Performed by: NURSE PRACTITIONER

## 2018-10-07 PROCEDURE — 99292 CRITICAL CARE ADDL 30 MIN: CPT | Mod: 25,,, | Performed by: NURSE PRACTITIONER

## 2018-10-07 PROCEDURE — 63600175 PHARM REV CODE 636 W HCPCS: Performed by: NURSE PRACTITIONER

## 2018-10-07 PROCEDURE — 84443 ASSAY THYROID STIM HORMONE: CPT

## 2018-10-07 PROCEDURE — 27100108

## 2018-10-07 PROCEDURE — 25000003 PHARM REV CODE 250

## 2018-10-07 PROCEDURE — 99900035 HC TECH TIME PER 15 MIN (STAT)

## 2018-10-07 PROCEDURE — 36620 INSERTION CATHETER ARTERY: CPT | Mod: 59,,, | Performed by: NURSE PRACTITIONER

## 2018-10-07 PROCEDURE — 99900026 HC AIRWAY MAINTENANCE (STAT)

## 2018-10-07 PROCEDURE — 5A1955Z RESPIRATORY VENTILATION, GREATER THAN 96 CONSECUTIVE HOURS: ICD-10-PCS | Performed by: FAMILY MEDICINE

## 2018-10-07 PROCEDURE — 83880 ASSAY OF NATRIURETIC PEPTIDE: CPT

## 2018-10-07 PROCEDURE — 93005 ELECTROCARDIOGRAM TRACING: CPT

## 2018-10-07 PROCEDURE — 32551 INSERTION OF CHEST TUBE: CPT | Mod: ,,, | Performed by: THORACIC SURGERY (CARDIOTHORACIC VASCULAR SURGERY)

## 2018-10-07 PROCEDURE — 20000000 HC ICU ROOM

## 2018-10-07 PROCEDURE — 85730 THROMBOPLASTIN TIME PARTIAL: CPT

## 2018-10-07 PROCEDURE — 80307 DRUG TEST PRSMV CHEM ANLYZR: CPT

## 2018-10-07 PROCEDURE — 84484 ASSAY OF TROPONIN QUANT: CPT

## 2018-10-07 PROCEDURE — 87086 URINE CULTURE/COLONY COUNT: CPT

## 2018-10-07 PROCEDURE — 80048 BASIC METABOLIC PNL TOTAL CA: CPT | Mod: 91

## 2018-10-07 PROCEDURE — 0W9930Z DRAINAGE OF RIGHT PLEURAL CAVITY WITH DRAINAGE DEVICE, PERCUTANEOUS APPROACH: ICD-10-PCS | Performed by: FAMILY MEDICINE

## 2018-10-07 PROCEDURE — 84145 PROCALCITONIN (PCT): CPT

## 2018-10-07 PROCEDURE — 83605 ASSAY OF LACTIC ACID: CPT

## 2018-10-07 PROCEDURE — 63600175 PHARM REV CODE 636 W HCPCS: Performed by: STUDENT IN AN ORGANIZED HEALTH CARE EDUCATION/TRAINING PROGRAM

## 2018-10-07 RX ORDER — HEPARIN SODIUM 5000 [USP'U]/ML
5000 INJECTION, SOLUTION INTRAVENOUS; SUBCUTANEOUS EVERY 8 HOURS
Status: DISCONTINUED | OUTPATIENT
Start: 2018-10-07 | End: 2018-10-07

## 2018-10-07 RX ORDER — MAGNESIUM SULFATE HEPTAHYDRATE 40 MG/ML
2 INJECTION, SOLUTION INTRAVENOUS
Status: DISCONTINUED | OUTPATIENT
Start: 2018-10-07 | End: 2018-10-13 | Stop reason: HOSPADM

## 2018-10-07 RX ORDER — POLYETHYLENE GLYCOL 3350 17 G/17G
17 POWDER, FOR SOLUTION ORAL DAILY
Status: DISCONTINUED | OUTPATIENT
Start: 2018-10-07 | End: 2018-10-12

## 2018-10-07 RX ORDER — VANCOMYCIN HCL IN 5 % DEXTROSE 1G/250ML
1000 PLASTIC BAG, INJECTION (ML) INTRAVENOUS
Status: COMPLETED | OUTPATIENT
Start: 2018-10-07 | End: 2018-10-07

## 2018-10-07 RX ORDER — BUSPIRONE HYDROCHLORIDE 10 MG/1
30 TABLET ORAL ONCE
Status: DISCONTINUED | OUTPATIENT
Start: 2018-10-07 | End: 2018-10-10

## 2018-10-07 RX ORDER — BISACODYL 10 MG
10 SUPPOSITORY, RECTAL RECTAL DAILY PRN
Status: DISCONTINUED | OUTPATIENT
Start: 2018-10-07 | End: 2018-10-13 | Stop reason: HOSPADM

## 2018-10-07 RX ORDER — HYDRALAZINE HYDROCHLORIDE 20 MG/ML
10 INJECTION INTRAMUSCULAR; INTRAVENOUS
Status: COMPLETED | OUTPATIENT
Start: 2018-10-07 | End: 2018-10-07

## 2018-10-07 RX ORDER — NOREPINEPHRINE BITARTRATE/D5W 4MG/250ML
0.05 PLASTIC BAG, INJECTION (ML) INTRAVENOUS CONTINUOUS
Status: DISCONTINUED | OUTPATIENT
Start: 2018-10-07 | End: 2018-10-07

## 2018-10-07 RX ORDER — GLUCAGON 1 MG
1 KIT INJECTION
Status: DISCONTINUED | OUTPATIENT
Start: 2018-10-07 | End: 2018-10-10

## 2018-10-07 RX ORDER — NICARDIPINE HYDROCHLORIDE 0.2 MG/ML
2 INJECTION INTRAVENOUS CONTINUOUS
Status: DISCONTINUED | OUTPATIENT
Start: 2018-10-07 | End: 2018-10-11

## 2018-10-07 RX ORDER — FAMOTIDINE 10 MG/ML
20 INJECTION INTRAVENOUS DAILY
Status: DISCONTINUED | OUTPATIENT
Start: 2018-10-07 | End: 2018-10-10

## 2018-10-07 RX ORDER — INSULIN ASPART 100 [IU]/ML
1-10 INJECTION, SOLUTION INTRAVENOUS; SUBCUTANEOUS EVERY 6 HOURS PRN
Status: DISCONTINUED | OUTPATIENT
Start: 2018-10-07 | End: 2018-10-13 | Stop reason: HOSPADM

## 2018-10-07 RX ORDER — SODIUM CHLORIDE 9 MG/ML
INJECTION, SOLUTION INTRAVENOUS CONTINUOUS
Status: DISCONTINUED | OUTPATIENT
Start: 2018-10-07 | End: 2018-10-08

## 2018-10-07 RX ORDER — NAPROXEN SODIUM 220 MG/1
81 TABLET, FILM COATED ORAL DAILY
Status: DISCONTINUED | OUTPATIENT
Start: 2018-10-07 | End: 2018-10-12

## 2018-10-07 RX ORDER — LORAZEPAM 2 MG/ML
INJECTION INTRAMUSCULAR
Status: COMPLETED
Start: 2018-10-07 | End: 2018-10-07

## 2018-10-07 RX ORDER — MAGNESIUM SULFATE HEPTAHYDRATE 40 MG/ML
4 INJECTION, SOLUTION INTRAVENOUS
Status: DISCONTINUED | OUTPATIENT
Start: 2018-10-07 | End: 2018-10-13 | Stop reason: HOSPADM

## 2018-10-07 RX ORDER — VANCOMYCIN HCL IN 5 % DEXTROSE 1G/250ML
1000 PLASTIC BAG, INJECTION (ML) INTRAVENOUS ONCE
Status: COMPLETED | OUTPATIENT
Start: 2018-10-08 | End: 2018-10-08

## 2018-10-07 RX ORDER — PROPOFOL 10 MG/ML
5 INJECTION, EMULSION INTRAVENOUS CONTINUOUS PRN
Status: DISCONTINUED | OUTPATIENT
Start: 2018-10-07 | End: 2018-10-08

## 2018-10-07 RX ORDER — CHLORHEXIDINE GLUCONATE ORAL RINSE 1.2 MG/ML
15 SOLUTION DENTAL 2 TIMES DAILY
Status: DISCONTINUED | OUTPATIENT
Start: 2018-10-07 | End: 2018-10-13 | Stop reason: HOSPADM

## 2018-10-07 RX ORDER — METOPROLOL TARTRATE 25 MG/1
12.5 TABLET ORAL 2 TIMES DAILY
Status: DISCONTINUED | OUTPATIENT
Start: 2018-10-07 | End: 2018-10-09

## 2018-10-07 RX ORDER — POTASSIUM CHLORIDE 29.8 MG/ML
40 INJECTION INTRAVENOUS
Status: DISCONTINUED | OUTPATIENT
Start: 2018-10-07 | End: 2018-10-13 | Stop reason: HOSPADM

## 2018-10-07 RX ORDER — CLOPIDOGREL BISULFATE 75 MG/1
75 TABLET ORAL DAILY
Status: DISCONTINUED | OUTPATIENT
Start: 2018-10-07 | End: 2018-10-13 | Stop reason: HOSPADM

## 2018-10-07 RX ORDER — HYDRALAZINE HYDROCHLORIDE 20 MG/ML
INJECTION INTRAMUSCULAR; INTRAVENOUS
Status: COMPLETED
Start: 2018-10-07 | End: 2018-10-07

## 2018-10-07 RX ORDER — ATORVASTATIN CALCIUM 40 MG/1
80 TABLET, FILM COATED ORAL DAILY
Status: DISCONTINUED | OUTPATIENT
Start: 2018-10-07 | End: 2018-10-13 | Stop reason: HOSPADM

## 2018-10-07 RX ORDER — ACETAMINOPHEN 650 MG/20.3ML
650 LIQUID ORAL EVERY 6 HOURS
Status: DISCONTINUED | OUTPATIENT
Start: 2018-10-07 | End: 2018-10-10

## 2018-10-07 RX ORDER — POTASSIUM CHLORIDE 14.9 MG/ML
60 INJECTION INTRAVENOUS
Status: DISCONTINUED | OUTPATIENT
Start: 2018-10-07 | End: 2018-10-13 | Stop reason: HOSPADM

## 2018-10-07 RX ORDER — NOREPINEPHRINE BITARTRATE/D5W 4MG/250ML
PLASTIC BAG, INJECTION (ML) INTRAVENOUS
Status: COMPLETED
Start: 2018-10-07 | End: 2018-10-07

## 2018-10-07 RX ORDER — POTASSIUM CHLORIDE 29.8 MG/ML
80 INJECTION INTRAVENOUS
Status: DISCONTINUED | OUTPATIENT
Start: 2018-10-07 | End: 2018-10-13 | Stop reason: HOSPADM

## 2018-10-07 RX ADMIN — MAGNESIUM SULFATE HEPTAHYDRATE 4 G: 40 INJECTION, SOLUTION INTRAVENOUS at 05:10

## 2018-10-07 RX ADMIN — PROPOFOL 40 MCG/KG/MIN: 10 INJECTION, EMULSION INTRAVENOUS at 05:10

## 2018-10-07 RX ADMIN — SODIUM CHLORIDE: 0.9 INJECTION, SOLUTION INTRAVENOUS at 10:10

## 2018-10-07 RX ADMIN — VANCOMYCIN HYDROCHLORIDE 1000 MG: 1 INJECTION, POWDER, LYOPHILIZED, FOR SOLUTION INTRAVENOUS at 05:10

## 2018-10-07 RX ADMIN — FAMOTIDINE 20 MG: 10 INJECTION, SOLUTION INTRAVENOUS at 10:10

## 2018-10-07 RX ADMIN — CHLORHEXIDINE GLUCONATE 15 ML: 1.2 RINSE ORAL at 09:10

## 2018-10-07 RX ADMIN — PIPERACILLIN AND TAZOBACTAM 4.5 G: 4; .5 INJECTION, POWDER, LYOPHILIZED, FOR SOLUTION INTRAVENOUS; PARENTERAL at 04:10

## 2018-10-07 RX ADMIN — POLYETHYLENE GLYCOL 3350 17 G: 17 POWDER, FOR SOLUTION ORAL at 10:10

## 2018-10-07 RX ADMIN — Medication 0.05 MCG/KG/MIN: at 04:10

## 2018-10-07 RX ADMIN — METOPROLOL TARTRATE 12.5 MG: 25 TABLET, FILM COATED ORAL at 12:10

## 2018-10-07 RX ADMIN — LORAZEPAM 2 MG: 2 INJECTION INTRAMUSCULAR; INTRAVENOUS at 04:10

## 2018-10-07 RX ADMIN — METOPROLOL TARTRATE 12.5 MG: 25 TABLET, FILM COATED ORAL at 09:10

## 2018-10-07 RX ADMIN — ACETAMINOPHEN 650 MG: 160 SOLUTION ORAL at 05:10

## 2018-10-07 RX ADMIN — NICARDIPINE HYDROCHLORIDE 2 MG/HR: 0.2 INJECTION, SOLUTION INTRAVENOUS at 04:10

## 2018-10-07 RX ADMIN — POTASSIUM CHLORIDE 40 MEQ: 400 INJECTION, SOLUTION INTRAVENOUS at 10:10

## 2018-10-07 RX ADMIN — ATORVASTATIN CALCIUM 80 MG: 40 TABLET, FILM COATED ORAL at 12:10

## 2018-10-07 RX ADMIN — SODIUM CHLORIDE 1000 ML: 0.9 INJECTION, SOLUTION INTRAVENOUS at 02:10

## 2018-10-07 RX ADMIN — HYDRALAZINE HYDROCHLORIDE 10 MG: 20 INJECTION INTRAMUSCULAR; INTRAVENOUS at 02:10

## 2018-10-07 RX ADMIN — ASPIRIN 81 MG CHEWABLE TABLET 81 MG: 81 TABLET CHEWABLE at 12:10

## 2018-10-07 RX ADMIN — ACETAMINOPHEN 650 MG: 160 SOLUTION ORAL at 12:10

## 2018-10-07 RX ADMIN — SODIUM CHLORIDE 2154 ML: 0.9 INJECTION, SOLUTION INTRAVENOUS at 03:10

## 2018-10-07 RX ADMIN — CLOPIDOGREL BISULFATE 75 MG: 75 TABLET ORAL at 12:10

## 2018-10-07 RX ADMIN — LORAZEPAM 2 MG: 2 INJECTION INTRAMUSCULAR; INTRAVENOUS at 03:10

## 2018-10-07 RX ADMIN — CHLORHEXIDINE GLUCONATE 15 ML: 1.2 RINSE ORAL at 10:10

## 2018-10-07 RX ADMIN — PROPOFOL 45 MCG/KG/MIN: 10 INJECTION, EMULSION INTRAVENOUS at 11:10

## 2018-10-07 RX ADMIN — PROPOFOL 20 MCG/KG/MIN: 10 INJECTION, EMULSION INTRAVENOUS at 10:10

## 2018-10-07 NOTE — ED NOTES
R-sided chest tube dressing saturated with blood.  Dressing changed.  Tube securely sutured in place, xeroform gauze, drain sponges, and foam tape applied.

## 2018-10-07 NOTE — PROGRESS NOTES
82 y/o  Admitted to ICU  On mechanical ventilation  With a dx of cardiac arrest  And  acute respiratory failure .  Pt was started on hypothermia protocol .  Pt had a  Right pneumothorax 2/2 to  Broken ribs . CT surgery consulted for a  Right chest tube placement .  Neurology was consulted  . Cont current tx .

## 2018-10-07 NOTE — ASSESSMENT & PLAN NOTE
Unclear etiology.  CPR done for approximately 30 min per nursing staff.  Initial rhythm was bradycardic, received atropine and epinephrine with return of spontaneous circulation after two rounds of epi.  Currently GCS 3, unresponsive.  No corneal, no gag reflex.  Note on propofol drip.  Not breathing over the vent.  To be initiated on hypothermia protocol.  Discussed with Dr. Christianson, ED physician.  Pulmonary, Neurology consulted.

## 2018-10-07 NOTE — PROGRESS NOTES
Examined and reviewed films  Still right pneumo  Right chest tube in Sub cut tissues  This was removed  Thoracic surgery consulted for replacement chest tube

## 2018-10-07 NOTE — ASSESSMENT & PLAN NOTE
Attempt to control with Propofol infusion  BP labile at times needed Levophed infusion  Arterial line placed for continuous hemodynamic monitoring

## 2018-10-07 NOTE — PROCEDURES
"Charline Messer is a 83 y.o. female patient.    Temp: (!) 92.7 °F (33.7 °C) (10/07/18 0823)  Pulse: 65 (10/07/18 1119)  Resp: (!) 23 (10/07/18 1119)  BP: (!) 120/48 (10/07/18 1100)  SpO2: 100 % (10/07/18 1119)  Weight: 71.8 kg (158 lb 3 oz) (10/07/18 0306)  Height: 4' 11.84" (152 cm) (10/07/18 0923)       Central Line  Date/Time: 10/7/2018 10:31 AM  Location procedure was performed: [facility] INTENSIVE CARE UNIT  Performed by: Wes Padilla NP  Pre-operative Diagnosis: Cardiac Arrest  Post-operative diagnosis: Cardiac Arrest  Consent Done: Emergent Situation  Time out: Immediately prior to procedure a "time out" was called to verify the correct patient, procedure, equipment, support staff and site/side marked as required.  Indications: med administration and vascular access  Preparation: skin prepped with ChloraPrep  Skin prep agent dried: skin prep agent completely dried prior to procedure  Sterile barriers: all five maximum sterile barriers used - cap, mask, sterile gown, sterile gloves, and large sterile sheet  Hand hygiene: hand hygiene performed prior to central venous catheter insertion  Location details: left internal jugular  Catheter type: triple lumen  Catheter size: 7 Fr  Catheter Length: 15cm    Ultrasound guidance: yes  Vessel Caliber: small, patent, compressibility normal  Vascular Doppler: not done  Needle advanced into vessel with real time Ultrasound guidance.  Guidewire confirmed in vessel.  Sterile sheath used.  Manometry: No   Number of attempts: 3  Assessment: placement verified by x-ray and successful placement  Complications: none  Estimated blood loss (mL): 3  Specimens: No  Implants: No  Post-procedure: line sutured,  chlorhexidine patch,  sterile dressing applied and blood return through all ports  Complications: No  Comments: Previous right IJ CL malpositioned on arrival to ICU and removed.           Wes Padilla  10/7/2018  "

## 2018-10-07 NOTE — SUBJECTIVE & OBJECTIVE
Past Medical History:   Diagnosis Date    Acute hypoxemic respiratory failure 10/7/2018    RYLIE (acute kidney injury) 10/7/2018    Allergic rhinitis, cause unspecified     Arthritis     Benign colonic polyp     Breast cancer mid 1980s    right; per patient s/p right mastectomy and chemo, unsure about radiation    CHF (congestive heart failure)     Coronary artery disease     Coronary artery disease involving native coronary artery of native heart without angina pectoris 8/2/2017    Depression     Diverticular disease     External hemorrhoids     Gout attack     Hyperlipidemia     Hypertension     Hypothyroidism     Obesity (BMI 30-39.9) 10/24/2016    Osteoarthritis     Peripheral vascular disease     Postmenopausal     Pulmonary embolism     reported per patient; unsure date    ST elevation myocardial infarction (STEMI) of anterior wall 6/27/2017    Thrombocytopenia     Tinnitus aurium     Vitamin D deficiency disease        Past Surgical History:   Procedure Laterality Date    BREAST SURGERY      CARDIAC CATHETERIZATION      HEART CATH-LEFT Left 6/27/2017    Performed by [facility] at [facility] CATH LAB    PARTIAL HYSTERECTOMY      Due to fibroids    Right mastectomy         Review of patient's allergies indicates:   Allergen Reactions    Codeine Nausea And Vomiting    Ibuprofen Hives and Itching           Zetia [ezetimibe] Nausea And Vomiting       Family History     Problem Relation (Age of Onset)    Heart disease Mother, Sister    Hypertension Mother, Father    No Known Problems Son    Stroke Mother        Tobacco Use    Smoking status: Never Smoker    Smokeless tobacco: Never Used   Substance and Sexual Activity    Alcohol use: Yes     Comment: ocassionally    Drug use: No    Sexual activity: No         Review of Systems   Unable to perform ROS: Intubated     Objective:     Vital Signs (Most Recent):  Temp: (!) 92.7 °F (33.7 °C) (10/07/18 0823)  Pulse: 71 (10/07/18  0926)  Resp: (!) 23 (10/07/18 0926)  BP: (!) 146/67 (10/07/18 0830)  SpO2: 100 % (10/07/18 0926) Vital Signs (24h Range):  Temp:  [92.7 °F (33.7 °C)-97.1 °F (36.2 °C)] 92.7 °F (33.7 °C)  Pulse:  [] 71  Resp:  [10-29] 23  SpO2:  [94 %-100 %] 100 %  BP: ()/() 146/67     Weight: 71.8 kg (158 lb 3 oz)  Body mass index is 31.06 kg/m².      Intake/Output Summary (Last 24 hours) at 10/7/2018 1015  Last data filed at 10/7/2018 0945  Gross per 24 hour   Intake 3254 ml   Output 505 ml   Net 2749 ml       Physical Exam   Constitutional: She appears well-developed and well-nourished. She appears toxic. She has a sickly appearance. She appears ill. No distress. She is intubated.   HENT:   Head: Normocephalic and atraumatic.   Mouth/Throat: Oropharynx is clear and moist and mucous membranes are normal.   Eyes: Lids are normal.   Pupils pinpoint and do not appear to be reactive   Neck: Trachea normal. Neck supple. Carotid bruit is not present.       Cardiovascular: Regular rhythm. Bradycardia present. Exam reveals distant heart sounds.   Pulses:       Radial pulses are 2+ on the right side, and 2+ on the left side.        Dorsalis pedis pulses are 0 on the right side, and 0 on the left side.        Posterior tibial pulses are Detected w/ doppler on the right side, and Detected w/ doppler on the left side.   Pulmonary/Chest: Effort normal. No accessory muscle usage. She is intubated. No respiratory distress. She has decreased breath sounds in the right middle field and the right lower field. She has rales.       Abdominal: Soft. She exhibits no distension. Bowel sounds are absent. There is no tenderness.   Obese    Genitourinary:   Genitourinary Comments: Swain in place   Musculoskeletal:        Right foot: There is no deformity.        Left foot: There is no deformity.   +1 bilat tibial edema   Lymphadenopathy:     She has no cervical adenopathy.   Neurological: She is unresponsive.   Intermittent decorticate  posturing.  Pupils pinpoint.  No withdrawal to pain.  No corneal reflex.  No spont or purposeful movements   Skin: Skin is dry. Capillary refill takes less than 2 seconds. No rash noted. No cyanosis.            Vents:  Vent Mode: A/C (10/07/18 0926)  Set Rate: 22 bmp (10/07/18 0926)  Vt Set: 380 mL (10/07/18 0926)  Pressure Support: 0 cmH20 (10/07/18 0926)  PEEP/CPAP: 5 cmH20 (10/07/18 0926)  Oxygen Concentration (%): 40 (10/07/18 0926)  Peak Airway Pressure: 31 cmH2O (10/07/18 0926)  Plateau Pressure: 0 cmH20 (10/07/18 0926)  Total Ve: 9.94 mL (10/07/18 0926)  F/VT Ratio<105 (RSBI): (!) 53.36 (10/07/18 0926)    Lines/Drains/Airways     Central Venous Catheter Line                 Percutaneous Central Line Insertion/Assessment - triple lumen  10/07/18 0405 right internal jugular less than 1 day          Drain                 Chest Tube 10/07/18 0208 1 Right Fourth intercostal space 24 Fr. less than 1 day         NG/OG Tube 10/07/18 0230 orogastric 16 Fr. Right mouth less than 1 day         Urethral Catheter 10/07/18 0315 Latex 16 Fr. less than 1 day          Airway                 Airway - Non-Surgical 10/07/18 0145 Endotracheal Tube less than 1 day          Intraosseous Line                 Intraosseous Line 10/07/18 Humerus less than 1 day          Peripheral Intravenous Line                 Peripheral IV - Double Lumen 10/07/18 0157 Right Antecubital less than 1 day                Significant Labs:    CBC/Anemia Profile:  Recent Labs   Lab  10/07/18   0204  10/07/18   0622   WBC  9.62  14.72*   HGB  12.8  10.6*   HCT  43.0  33.3*   PLT  115*  140*   MCV  95  88   RDW  14.2  14.2        Chemistries:  Recent Labs   Lab  10/07/18   0204   NA  137   K  4.1   CL  100   CO2  13*   BUN  17   CREATININE  1.6*   CALCIUM  9.0   ALBUMIN  3.5   PROT  7.0   BILITOT  0.4   ALKPHOS  144*   ALT  59*   AST  105*       ABGs:   Recent Labs   Lab  10/07/18   0647   PH  7.318*   PCO2  34.6*   HCO3  17.7*   POCSATURATED  100   BE  -8      Coagulation:   Recent Labs   Lab  10/07/18   0622   INR  1.3*   APTT  27.1     Lactic Acid:   Recent Labs   Lab  10/07/18   0204  10/07/18   0622  10/07/18   1030   LACTATE  >12.0*  2.7*  3.2*     Urine Studies:   Recent Labs   Lab  10/07/18   0320   COLORU  Yellow   APPEARANCEUA  Clear   PHUR  7.0   SPECGRAV  1.025   PROTEINUA  2+*   GLUCUA  1+*   KETONESU  Negative   BILIRUBINUA  Negative   OCCULTUA  2+*   NITRITE  Negative   UROBILINOGEN  1.0   LEUKOCYTESUR  Negative   RBCUA  10*   WBCUA  1   BACTERIA  Few*   HYALINECASTS  1     All pertinent labs within the past 24 hours have been reviewed.    Significant Imaging:   CT: I have reviewed all pertinent results/findings within the past 24 hours and my personal findings are:  Head: no acute process noted.  Chest with loculated right hydrothorax vs infected bullae vs mass

## 2018-10-07 NOTE — ASSESSMENT & PLAN NOTE
Sternal fracture, multiple rib fractures as a result of CP earlier today.  Per family, patient remains full code at this time.  They are unwilling to discuss further code status.

## 2018-10-07 NOTE — PROCEDURES
"Charline Messer is a 83 y.o. female patient.    Temp: (!) 92.7 °F (33.7 °C) (10/07/18 0823)  Pulse: 65 (10/07/18 1119)  Resp: (!) 23 (10/07/18 1119)  BP: (!) 120/48 (10/07/18 1100)  SpO2: 100 % (10/07/18 1119)  Weight: 71.8 kg (158 lb 3 oz) (10/07/18 0306)  Height: 4' 11.84" (152 cm) (10/07/18 0923)       Arterial Line  Date/Time: 10/7/2018 11:00 AM  Location procedure was performed: [facility] INTENSIVE CARE UNIT  Performed by: Wes Padilla NP  Authorized by: Wes Padilla NP   Pre-op Diagnosis: shock  Post-operative diagnosis: shock  Consent Done: Not Needed  Preparation: Patient was prepped and draped in the usual sterile fashion.  Indications: multiple ABGs, respiratory failure and hemodynamic monitoring  Location: left radial  Patient sedated: yes  Sedatives: propofol (intubated)  Vitals: Vital signs were monitored during sedation.  Mitchell's test normal: yes  Needle gauge: 20  Seldinger technique: Seldinger technique used  Number of attempts: 2  Complications: No  Estimated blood loss (mL): 1  Specimens: No  Implants: No  Post-procedure: line sutured and dressing applied  Post-procedure CMS: unchanged  Patient tolerance: Patient tolerated the procedure well with no immediate complications          Wes Padilla  10/7/2018  "

## 2018-10-07 NOTE — CONSULTS
[facility]  Critical Care Medicine  Consult Note    Patient Name: Charline Messer  MRN: [id number]  Admission Date: 10/7/2018  Hospital Length of Stay: 0 days  Code Status: DNR  Attending Physician: Low Guthrie MD   Primary Care Provider: Primary Doctor No   Principal Problem: Hypoxic encephalopathy      Subjective:     HPI:  Ms Messer is a 82 yo BF with a PMH of CAD, CHF, HTN, HLD, Hypothyroidism, PAD, OA and Right Breast CA.  Per EMS, patient was working at [facility] [employer] last night when coworkers noticed patient was having difficulty breathing and then collapsed. EMS reports when they arrived patient was bradycardic in 40s and then shortly after pulse was lost. EMS and FD initiated CPR and administered epi and regained pulse. EMS reports shortly after, pulse was lost and another dose of epi was administered and regained pulse. Patient was intubated on scene with 7.0 ET tube.  She was presented to [facility] ED about 0230 hr this AM w/ GCS 3, LA > 12, SBP in 80s and started on Levophed infusion once CL obtained.  CT head unremarkable and CT chest with multiple right rib fractures.  She had right tension PTX and chest tube placed.          Hospital/ICU Course:  10/7 - Admitted to ICU this AM unresponsive on no sedation with some decorticate posturing.  SBP 180s off Levophed infusion.  Intubated on mech ventilation.  External cooling TTM started in ED.      Past Medical History:   Diagnosis Date    Acute hypoxemic respiratory failure 10/7/2018    RYLIE (acute kidney injury) 10/7/2018    Allergic rhinitis, cause unspecified     Arthritis     Benign colonic polyp     Breast cancer mid 1980s    right; per patient s/p right mastectomy and chemo, unsure about radiation    CHF (congestive heart failure)     Coronary artery disease     Coronary artery disease involving native coronary artery of native heart without angina pectoris 8/2/2017    Depression     Diverticular disease      External hemorrhoids     Gout attack     Hyperlipidemia     Hypertension     Hypothyroidism     Obesity (BMI 30-39.9) 10/24/2016    Osteoarthritis     Peripheral vascular disease     Postmenopausal     Pulmonary embolism     reported per patient; unsure date    ST elevation myocardial infarction (STEMI) of anterior wall 6/27/2017    Thrombocytopenia     Tinnitus aurium     Vitamin D deficiency disease        Past Surgical History:   Procedure Laterality Date    BREAST SURGERY      CARDIAC CATHETERIZATION      HEART CATH-LEFT Left 6/27/2017    Performed by Carlos Messer MD at [facility] [place] CATH LAB    PARTIAL HYSTERECTOMY      Due to fibroids    Right mastectomy         Review of patient's allergies indicates:   Allergen Reactions    Codeine Nausea And Vomiting    Ibuprofen Hives and Itching           Zetia [ezetimibe] Nausea And Vomiting       Family History     Problem Relation (Age of Onset)    Heart disease Mother, Sister    Hypertension Mother, Father    No Known Problems Son    Stroke Mother        Tobacco Use    Smoking status: Never Smoker    Smokeless tobacco: Never Used   Substance and Sexual Activity    Alcohol use: Yes     Comment: ocassionally    Drug use: No    Sexual activity: No         Review of Systems   Unable to perform ROS: Intubated     Objective:     Vital Signs (Most Recent):  Temp: (!) 92.7 °F (33.7 °C) (10/07/18 0823)  Pulse: 71 (10/07/18 0926)  Resp: (!) 23 (10/07/18 0926)  BP: (!) 146/67 (10/07/18 0830)  SpO2: 100 % (10/07/18 0926) Vital Signs (24h Range):  Temp:  [92.7 °F (33.7 °C)-97.1 °F (36.2 °C)] 92.7 °F (33.7 °C)  Pulse:  [] 71  Resp:  [10-29] 23  SpO2:  [94 %-100 %] 100 %  BP: ()/() 146/67     Weight: 71.8 kg (158 lb 3 oz)  Body mass index is 31.06 kg/m².      Intake/Output Summary (Last 24 hours) at 10/7/2018 1015  Last data filed at 10/7/2018 0945  Gross per 24 hour   Intake 3254 ml   Output 505 ml   Net 2749 ml       Physical Exam    Constitutional: She appears well-developed and well-nourished. She appears toxic. She has a sickly appearance. She appears ill. No distress. She is intubated.   HENT:   Head: Normocephalic and atraumatic.   Mouth/Throat: Oropharynx is clear and moist and mucous membranes are normal.   Eyes: Lids are normal.   Pupils pinpoint and do not appear to be reactive   Neck: Trachea normal. Neck supple. Carotid bruit is not present.       Cardiovascular: Regular rhythm. Bradycardia present. Exam reveals distant heart sounds.   Pulses:       Radial pulses are 2+ on the right side, and 2+ on the left side.        Dorsalis pedis pulses are 0 on the right side, and 0 on the left side.        Posterior tibial pulses are Detected w/ doppler on the right side, and Detected w/ doppler on the left side.   Pulmonary/Chest: Effort normal. No accessory muscle usage. She is intubated. No respiratory distress. She has decreased breath sounds in the right middle field and the right lower field. She has rales.       Abdominal: Soft. She exhibits no distension. Bowel sounds are absent. There is no tenderness.   Obese    Genitourinary:   Genitourinary Comments: Swain in place   Musculoskeletal:        Right foot: There is no deformity.        Left foot: There is no deformity.   +1 bilat tibial edema   Lymphadenopathy:     She has no cervical adenopathy.   Neurological: She is unresponsive.   Intermittent decorticate posturing.  Pupils pinpoint.  No withdrawal to pain.  No corneal reflex.  No spont or purposeful movements   Skin: Skin is dry. Capillary refill takes less than 2 seconds. No rash noted. No cyanosis.            Vents:  Vent Mode: A/C (10/07/18 0926)  Set Rate: 22 bmp (10/07/18 0926)  Vt Set: 380 mL (10/07/18 0926)  Pressure Support: 0 cmH20 (10/07/18 0926)  PEEP/CPAP: 5 cmH20 (10/07/18 0926)  Oxygen Concentration (%): 40 (10/07/18 0926)  Peak Airway Pressure: 31 cmH2O (10/07/18 0926)  Plateau Pressure: 0 cmH20 (10/07/18  0926)  Total Ve: 9.94 mL (10/07/18 0926)  F/VT Ratio<105 (RSBI): (!) 53.36 (10/07/18 0926)    Lines/Drains/Airways     Central Venous Catheter Line                 Percutaneous Central Line Insertion/Assessment - triple lumen  10/07/18 0405 right internal jugular less than 1 day          Drain                 Chest Tube 10/07/18 0208 1 Right Fourth intercostal space 24 Fr. less than 1 day         NG/OG Tube 10/07/18 0230 orogastric 16 Fr. Right mouth less than 1 day         Urethral Catheter 10/07/18 0315 Latex 16 Fr. less than 1 day          Airway                 Airway - Non-Surgical 10/07/18 0145 Endotracheal Tube less than 1 day          Intraosseous Line                 Intraosseous Line 10/07/18 Humerus less than 1 day          Peripheral Intravenous Line                 Peripheral IV - Double Lumen 10/07/18 0157 Right Antecubital less than 1 day                Significant Labs:    CBC/Anemia Profile:  Recent Labs   Lab  10/07/18   0204  10/07/18   0622   WBC  9.62  14.72*   HGB  12.8  10.6*   HCT  43.0  33.3*   PLT  115*  140*   MCV  95  88   RDW  14.2  14.2        Chemistries:  Recent Labs   Lab  10/07/18   0204   NA  137   K  4.1   CL  100   CO2  13*   BUN  17   CREATININE  1.6*   CALCIUM  9.0   ALBUMIN  3.5   PROT  7.0   BILITOT  0.4   ALKPHOS  144*   ALT  59*   AST  105*       ABGs:   Recent Labs   Lab  10/07/18   0647   PH  7.318*   PCO2  34.6*   HCO3  17.7*   POCSATURATED  100   BE  -8     Coagulation:   Recent Labs   Lab  10/07/18   0622   INR  1.3*   APTT  27.1     Lactic Acid:   Recent Labs   Lab  10/07/18   0204  10/07/18   0622  10/07/18   1030   LACTATE  >12.0*  2.7*  3.2*     Urine Studies:   Recent Labs   Lab  10/07/18   0320   COLORU  Yellow   APPEARANCEUA  Clear   PHUR  7.0   SPECGRAV  1.025   PROTEINUA  2+*   GLUCUA  1+*   KETONESU  Negative   BILIRUBINUA  Negative   OCCULTUA  2+*   NITRITE  Negative   UROBILINOGEN  1.0   LEUKOCYTESUR  Negative   RBCUA  10*   WBCUA  1   BACTERIA  Few*    HYALINECASTS  1     All pertinent labs within the past 24 hours have been reviewed.    Significant Imaging:   CT: I have reviewed all pertinent results/findings within the past 24 hours and my personal findings are:  Head: no acute process noted.  Chest with loculated right hydrothorax vs infected bullae vs mass    Assessment/Plan:     Neuro   * Hypoxic encephalopathy    Likely anoxic injury  TTM started  Consult Neuro post TTM  ICU neuro monitoring  Supportive care  Propofol for TTM and possible underlying seizures  Check EEG if posturing persist on Propofol infusion  CT head unremarkable in ED for acute process        Pulmonary   Acute hypoxemic respiratory failure    Cont full vent support  Vent settings reviewed and adjusted multiple times  VAP prophylaxis  SAT/SBT post TTM and neuro improvement        Cardiac/Vascular   Coronary artery disease involving native coronary artery of native heart without angina pectoris    Resume home Plavix, statin and ASA  Resume low dose Lopressor as BP tolerates  ICU cardiac monitoring  Echo pending        PAD (peripheral artery disease)    Resume home Plavix and ASA        Essential hypertension    Attempt to control with Propofol infusion  BP labile at times needed Levophed infusion  Arterial line placed for continuous hemodynamic monitoring        Cardiac arrest    ICU cardiac monitoring  Echo pending  Cont supportive care        Renal/   RYLIE (acute kidney injury) w/ Met Acidosis    Cont IVFs and support BP  Follow up BMPs        Endocrine   Hypothyroidism    No Synthroid listed on home meds  Will check TSH/T3/T4        Hyperglycemia, unspecified    Add SSI  If unable to control glucose with SSI will start insulin infusion        Orthopedic   Closed fracture of multiple ribs of right side    Right chest tube for PTX        Other   Loculated right lateral pneumothorax    Chest tube displaced  Ask surgery to replace right chest tube  Blood and sputum cultures pending  Add  Zosyn  1 dose Vanc  Follow fever curve and repeat CBC in AM           Preventive Measures and Monitoring:   Stress Ulcer: Pepcid  Nutrition: NPO  Glucose control: SSI  Bowel prophylaxis: Miralax  DVT prophylaxis: SQ Hep/SCDs  Hx CAD on B-Blocker: Lopressor  Head of Bed/Reposition: Elevate HOB and turn Q1-2 hours   Early Mobility: bed rest  SAT/SBT: post TTM  Vent Day: #1  OG Day: #1  Central Line Left IJ Day: #1  Right chest tube Day: #1  Left Radial Arterial Line Day: #1  Swain Day: #1  IVAB Day: #1  Code Status: DNR  Pneumonia Vaccine: UTD  Flu Vaccine: ordered    Counseling/Consultation:I have discussed the care of this patient in detail with the bedside nursing staff and Dr. Guillen and Dr. Barragan and Dr. Redd    Critical Care Time: 80 minutes  Critical secondary to Patient has a condition that poses threat to life and bodily function: Acute Renal Failure and Resp Failure intubated and Cardiac Arrest  Patient has an abrupt change in neurologic status: Anoxic Encephalopathy  Patient is currently on drug therapy requiring intensive monitoring for toxicity: Levophed infusion  Patient is currently receiving parenteral controlled substances: Propofol infusion     Critical care was time spent personally by me on the following activities: development of treatment plan with patient or surrogate and bedside caregivers, discussions with consultants, evaluation of patient's response to treatment, examination of patient, ordering and performing treatments and interventions, ordering and review of laboratory studies, ordering and review of radiographic studies, pulse oximetry, re-evaluation of patient's condition. This critical care time did not overlap with that of any other provider or involve time for any procedures.    Thank you for your consult. I will follow-up with patient. Please contact us if you have any additional questions.     Wes Padilla NP  Critical Care Medicine  [facility]

## 2018-10-07 NOTE — NURSING
Pt arrived to ICU 2 via stretcher with ER nurse and respiratory at bedside. Pt transferred to ICU bed with max assist. Pt placed on ICU monitor and on mechanical ventilator.  Pt is unresponsive at this time. Pupils pinpoint and non-reactive. Decorticate posturing noted with pain. Order clarified for artic sun- pt only has ice packs at this time- per MD Redd he does want pt to be cooled with artic sun. See flowsheet for full assessment will continue to monitor.

## 2018-10-07 NOTE — PLAN OF CARE
Problem: Patient Care Overview  Goal: Plan of Care Review  Outcome: Ongoing (interventions implemented as appropriate)  Pt continues to be on mechanical vent..

## 2018-10-07 NOTE — PROCEDURES
"Charline Messer is a 83 y.o. female patient.    Temp: (!) 92.7 °F (33.7 °C) (10/07/18 0823)  Pulse: 65 (10/07/18 1119)  Resp: (!) 23 (10/07/18 1119)  BP: (!) 120/48 (10/07/18 1100)  SpO2: 100 % (10/07/18 1119)  Weight: 71.8 kg (158 lb 3 oz) (10/07/18 0306)  Height: 4' 11.84" (152 cm) (10/07/18 0923)       Chest Tube Insertion  Date/Time: 10/7/2018 12:31 PM  Location procedure was performed: [facility] INTENSIVE CARE UNIT  Performed by: Refugio Vasquez MD  Authorized by: Refugio Vasquez MD   Post-operative diagnosis: pneumothorax  Pre-operative diagnosis: pneumothorax  Indications: pneumothorax  Patient sedated: yes  Sedation type: deep sedation  (See MAR for exact dosages of medications).  Sedatives: propofol  Preparation: skin prepped with ChloraPrep  Tube size: 28 Portuguese  Dissection instrument: finger, Alicia clamp and scissors  Ultrasound guidance: no  Tension pneumothorax heard: no  Tube connected to: suction  Drainage characteristics: bloody  Suture material: 2-0 silk  Dressing: 4x4 sterile gauze  Post-insertion x-ray findings: tube in good position  Patient tolerance: Patient tolerated the procedure well with no immediate complications  Complications: No  Estimated blood loss (mL): 5  Specimens: No  Implants: No          Refugio Vasquez  10/7/2018  "

## 2018-10-07 NOTE — HOSPITAL COURSE
10/7 - Admitted to ICU this AM unresponsive on no sedation with some decorticate posturing.  SBP 180s off Levophed infusion.  Intubated on mech ventilation.  External cooling TTM started in ED.    10/8 -Remains unresponsive with decorticate posturing on no sedation. Some neurological improvement. Withdraws legs from painful stimuli. Pupils are reactive. Remains intubated on mech vent. BP is labile requiring alternating levophed and cardene.TTM continues. Good urine output.   10/9 - Re-warmed overnight completing TTM.  Silent Seizures this AM with attempted weaning of Propofol and confirmed with bedside EEG.  Still on vent with BP more stable not requiring Levophed or Cardene infusions  10/10 - This AM still intubated on mech vent support and unresponsive.  Eye twitching with holding of Propofol infusion this AM.  Emesis yesterday and last night.  SBP to 200 off Propofol infusion  10/11 - remains intubated on mechanical ventilation with low dose propofol sedation; SAT this am with subsequent gaze change and mild nystagmus concerning for continued seizure; required D50 for episode of hypoglycemia overnight  10/12 - remains on mechanical ventilation via OETT; depakote added yesterday and tolerated off propofol overnight without reported seizure activity however this am with stimulation for neuro exam I witnessed bilat foot tremor, 40pt HR elevation, left upward gaze deviation, and eyelid flickering concerning for seizure, propofol restarted; continued dark diarrhea with fecal management system in place

## 2018-10-07 NOTE — PLAN OF CARE
Per EMR, PMH significant for HTN, CAD, was working at [facility] [employer], when she had a witnessed cardiac arrest at around midnight.  Patient was complaining of shortness of breath and collapsed.  Medical staff immediately started CPR, No corneal reflex, no gag reflex, not breathing over the vent.  Laboratory workup reveals lactic acid greater than 12, hypotensive with systolic in the 80s, initiated on Levophed drip.  Currently not on sedation except for IV Ativan as needed.  Patient remains full code at this time.  Patient transferred to ICU. CM unable to obtain an optimal d/c plan at this time. CM to f/u for safe transition    [facility] - [facility] [place], [place] - [address]  [address]  [facility] [place] [address]  Phone: [phone] Fax: [phone]    [facility] [address] [facility] [place] [facility] [address]  [address] [facility] [address]  Phone: [phone] Fax: [phone]       10/07/18 0947   Discharge Assessment   Assessment Type Discharge Planning Assessment   Confirmed/corrected address and phone number on facesheet? No   Assessment information obtained from? Medical Record   Expected Length of Stay (days) (TBD)   Communicated expected length of stay with patient/caregiver no   Prior to hospitilization cognitive status: Coma/Sedated/Intubated   Current cognitive status: Coma/Sedated/Intubated   Lives With (Per EMR, patient was at [facility])   Able to Return to Prior Arrangements unable to determine at this time (comments)   Is patient able to care for self after discharge? Unable to determine at this time (comments)   Patient's perception of discharge disposition other (comments)   Equipment Currently Used at Home none   Do you have any problems affording any of your prescribed medications? TBD   Discharge Plan A Other   Discharge Plan B Other   Patient/Family In Agreement With Plan unable to assess

## 2018-10-07 NOTE — ASSESSMENT & PLAN NOTE
Lactic acid greater than 12.  Secondary to cardiac arrest.  Repeat every 4 hr x2.  Continue IV vancomycin, IV Zosyn empirically.  Follow up on cultures.

## 2018-10-07 NOTE — SUBJECTIVE & OBJECTIVE
Past Medical History:   Diagnosis Date    Allergic rhinitis, cause unspecified     Arthritis     Benign colonic polyp     Breast cancer mid 1980s    right; per patient s/p right mastectomy and chemo, unsure about radiation    CHF (congestive heart failure)     Coronary artery disease     Coronary artery disease involving native coronary artery of native heart without angina pectoris 8/2/2017    Depression     Diverticular disease     External hemorrhoids     Gout attack     Hyperlipidemia     Hypertension     Hypothyroidism     Obesity (BMI 30-39.9) 10/24/2016    Osteoarthritis     Peripheral vascular disease     Postmenopausal     Pulmonary embolism     reported per patient; unsure date    ST elevation myocardial infarction (STEMI) of anterior wall 6/27/2017    Thrombocytopenia     Tinnitus aurium     Vitamin D deficiency disease        Past Surgical History:   Procedure Laterality Date    BREAST SURGERY      CARDIAC CATHETERIZATION      HEART CATH-LEFT Left 6/27/2017    [facility]formed [facility] at [facility] LAB    PARTIAL HYSTERECTOMY      Due to fibroids    Right mastectomy         Review of patient's allergies indicates:   Allergen Reactions    Codeine Nausea And Vomiting    Ibuprofen Hives and Itching           Zetia [ezetimibe] Nausea And Vomiting       Current Facility-Administered Medications on File Prior to Encounter   Medication    lidocaine-EPINEPHrine 1%-1:100,000 injection 5 mL     Current Outpatient Medications on File Prior to Encounter   Medication Sig    albuterol 90 mcg/actuation inhaler Inhale 1-2 puffs into the lungs every 6 (six) hours as needed.     aspirin (ECOTRIN) 81 MG EC tablet Take 1 tablet by mouth Daily.    atorvastatin (LIPITOR) 80 MG tablet TAKE ONE TABLET BY MOUTH ONCE DAILY    clopidogrel (PLAVIX) 75 mg tablet TAKE ONE TABLET BY MOUTH ONCE DAILY    colchicine 0.6 mg tablet Take 1 pill daily prn    cyclobenzaprine (FLEXERIL) 10 MG tablet  1/2 to one tab po qhs prn muscle spasm    fluticasone (FLONASE) 50 mcg/actuation nasal spray 2 sprays by Each Nare route daily as needed for Rhinitis.    furosemide (LASIX) 20 MG tablet Take 1 tablet by mouth Daily. prn (Patient taking differently: Take 20 mg by mouth as needed. Take 1 tablet by mouth Daily. prn)    isosorbide mononitrate (IMDUR) 30 MG 24 hr tablet TAKE ONE TABLET BY MOUTH ONCE DAILY    metoprolol tartrate (LOPRESSOR) 50 MG tablet Take 1 tablet (50 mg total) by mouth 2 (two) times daily.     Family History     Problem Relation (Age of Onset)    Heart disease Mother, Sister    Hypertension Mother, Father    No Known Problems Son    Stroke Mother        Tobacco Use    Smoking status: Never Smoker    Smokeless tobacco: Never Used   Substance and Sexual Activity    Alcohol use: Yes     Comment: ocassionally    Drug use: No    Sexual activity: No     Review of Systems   Unable to perform ROS: Patient unresponsive     Objective:     Vital Signs (Most Recent):  Temp: (!) 92.9 °F (33.8 °C) (10/07/18 0531)  Pulse: 66 (10/07/18 0531)  Resp: 16 (10/07/18 0531)  BP: (!) 104/58 (10/07/18 0531)  SpO2: 100 % (10/07/18 0531) Vital Signs (24h Range):  Temp:  [92.9 °F (33.8 °C)] 92.9 °F (33.8 °C)  Pulse:  [] 66  Resp:  [10-29] 16  SpO2:  [94 %-100 %] 100 %  BP: ()/() 104/58     Weight: 71.8 kg (158 lb 3 oz)  Body mass index is 30.89 kg/m².    Physical Exam   Constitutional: She is intubated.   HENT:   Head: Normocephalic and atraumatic.   Eyes: Conjunctivae are normal. No scleral icterus. Right pupil is not reactive. Left pupil is not reactive.   Cardiovascular: Normal rate and regular rhythm.   No murmur heard.  Pulmonary/Chest: She is intubated. She has no wheezes. She has no rhonchi.   Abdominal: Soft. No hernia.   Musculoskeletal: She exhibits no edema.   Lymphadenopathy:     She has no cervical adenopathy.   Neurological: She is unresponsive. She exhibits normal muscle tone. GCS eye  subscore is 1. GCS verbal subscore is 1. GCS motor subscore is 1.   GCS 3. Not on propofol drip. But on ativan IV as needed.   Skin: Skin is warm. No erythema.   Psychiatric:   Unable to evaluate   Nursing note and vitals reviewed.          Significant Labs:   ABGs:   Recent Labs   Lab  10/07/18   0330   PH  7.246*   PCO2  45.5*   HCO3  19.7*   POCSATURATED  100   BE  -8     BMP:   Recent Labs   Lab  10/07/18   0204   GLU  298*   NA  137   K  4.1   CL  100   CO2  13*   BUN  17   CREATININE  1.6*   CALCIUM  9.0     CBC:   Recent Labs   Lab  10/07/18   0204   WBC  9.62   HGB  12.8   HCT  43.0   PLT  115*     CMP:   Recent Labs   Lab  10/07/18   0204   NA  137   K  4.1   CL  100   CO2  13*   GLU  298*   BUN  17   CREATININE  1.6*   CALCIUM  9.0   PROT  7.0   ALBUMIN  3.5   BILITOT  0.4   ALKPHOS  144*   AST  105*   ALT  59*   ANIONGAP  24*   EGFRNONAA  30*     Cardiac Markers:   Recent Labs   Lab  10/07/18   0321   BNP  14     Lactic Acid:   Recent Labs   Lab  10/07/18   0204   LACTATE  >12.0*     Magnesium: No results for input(s): MG in the last 48 hours.  Troponin:   Recent Labs   Lab  10/07/18   0204   TROPONINI  <0.006     Urine Studies:   Recent Labs   Lab  10/07/18   0320   COLORU  Yellow   APPEARANCEUA  Clear   PHUR  7.0   SPECGRAV  1.025   PROTEINUA  2+*   GLUCUA  1+*   KETONESU  Negative   BILIRUBINUA  Negative   OCCULTUA  2+*   NITRITE  Negative   UROBILINOGEN  1.0   LEUKOCYTESUR  Negative   RBCUA  10*   WBCUA  1   BACTERIA  Few*   HYALINECASTS  1     All pertinent labs within the past 24 hours have been reviewed.    Significant Imaging: I have reviewed and interpreted all pertinent imaging results/findings within the past 24 hours.     Imaging Results          X-Ray Chest 1 View (In process)                CT Chest Without Contrast (In process)                CT Head Without Contrast (In process)                X-Ray Chest 1 View (In process)                X-Ray Chest AP Portable (In process)                Per Virtual radiology, pt's CT Head results   1. No intracranial hemorrhage or other acute intracranial abnormality.  2. Extra-axial dural-based calcification versus calcified meningioma measuring 10 x 14 x 8 mm overlying the right superior frontal gyrus.  3. Chronic microvascular ischemia changes.  4. Right parietal scalp contusion.      Per Virtual radiology, pt's CT Chest results   1. Right anterior second through sixth rib fractures and sternum fracture presumably related to the reported chest compressions.  2. Thoracostomy tube, endotracheal tube, and enteric tube as described.  3. Small right pneumothorax status post thoracostomy tube placement.  3. Right lung contusions and pneumatocele.     Ordered, reviewed, and independently interpreted.  Study: X-ray Chest  Findings: ET tube in place. Central line in place. Right lung inflated. R pneumothorax.          I have independently reviewed and interpreted the EKG.    I have independently reviewed all pertinent labs within the past 24 hours.    I have independently reviewed, visualized and interpreted all pertinent imaging results within the past 24 hours and discussed the findings with the ED physician, Dr. Christianson.

## 2018-10-07 NOTE — PROGRESS NOTES
Pt received intubated with #7.0 OETT; placed on [facility] ventilator; x-ray showed pneumothorax; Dr. Christianson placed chest tube to right lung.

## 2018-10-07 NOTE — ED NOTES
Hypothermia protocol target temp 91.4. Per Dr Christianson and Dr Guthrie, apply ice packs to core to reach target temp. Not to use arctic sun at this time.

## 2018-10-07 NOTE — ED PROVIDER NOTES
SCRIBE #1 NOTE: I, Nkechi Vaz, am scribing for, and in the presence of, Dixie Christianson MD. I have scribed the entire note.      History      Chief Complaint   Patient presents with    Cardiac Arrest       Review of patient's allergies indicates:   Allergen Reactions    Codeine Nausea And Vomiting    Ibuprofen Hives and Itching           Zetia [ezetimibe] Nausea And Vomiting        HPI   HPI    10/7/2018, 1:52 AM   History obtained from the EMS   History limited secondary to patient being intubated      History of Present Illness: Charline Messer is a 83 y.o. female patient with PMHx of CHF, CAD, MI, and HTN who presents to the Emergency Department for evaluation post-cardiac arrest. Per EMS, patient was working at [facility] [employer] when coworkers noticed patient was having difficulty breathing and then collapsed. EMS reports when they arrived patient was bradycardic in 40s and then shortly after pulse was lost. EMS and FD initiated CPR and administered epi and regained pulse. EMS reports shortly after, pulse was lost and another dose of epi was administered and regained pulse. Patient was intubated on scene with 7.0 ET tube.     Arrival mode: EMS     PCP: Primary Doctor No       Past Medical History:  Past Medical History:   Diagnosis Date    Allergic rhinitis, cause unspecified     Arthritis     Benign colonic polyp     Breast cancer mid 1980s    right; per patient s/p right mastectomy and chemo, unsure about radiation    CHF (congestive heart failure)     Coronary artery disease     Coronary artery disease involving native coronary artery of native heart without angina pectoris 8/2/2017    Depression     Diverticular disease     External hemorrhoids     Gout attack     Hyperlipidemia     Hypertension     Hypothyroidism     Obesity (BMI 30-39.9) 10/24/2016    Osteoarthritis     Peripheral vascular disease     Postmenopausal     Pulmonary embolism     reported per patient; unsure  date    ST elevation myocardial infarction (STEMI) of anterior wall 6/27/2017    Thrombocytopenia     Tinnitus aurium     Vitamin D deficiency disease        Past Surgical History:  Past Surgical History:   Procedure Laterality Date    BREAST SURGERY      CARDIAC CATHETERIZATION      HEART CATH-LEFT Left 6/27/2017    Performed by Carlos Messer MD at [facility] CATH LAB    PARTIAL HYSTERECTOMY      Due to fibroids    Right mastectomy           Family History:  Family History   Problem Relation Age of Onset    Heart disease Mother     Hypertension Mother     Stroke Mother     Hypertension Father     Heart disease Sister     No Known Problems Son     Cancer Neg Hx     Diabetes Neg Hx     Kidney disease Neg Hx        Social History:  Social History     Tobacco Use    Smoking status: Never Smoker    Smokeless tobacco: Never Used   Substance and Sexual Activity    Alcohol use: Yes     Comment: ocassionally    Drug use: No    Sexual activity: No       ROS   Review of Systems   Unable to perform ROS: Intubated       Physical Exam      Initial Vitals [10/07/18 0202]   BP Pulse Resp Temp SpO2   (!) 98/53 (!) 122 18 -- 99 %      MAP       --          Physical Exam  Physical exam is limited due to the patient's condition.   General: Patient is unresponsive to verbal and painful stimuli. Patient on spine board.  Head: Atraumatic   Eyes: Pupils nonreactive to light. No corneal reflex.   ENT: Airway patent. ETT is in place   Cardiovascular: Tachycardic  Respiratory: No spontaneous respirations. Equal breath sounds with controlled ventilation by bag valve mask. Decreased breath sounds to R lateral lung base.  Abdominal: Abdominal distention, soft.  Musculoskeletal: No deformities   Skin: Warm and dry.   Neurological: Full neuro exam limited due to patient's condition. GCS 3.    ED Course    Thoracentesis  Date/Time: 10/7/2018 2:08 AM  Performed by: Dixie Christianson MD  Authorized by: Dixie Christianson MD    Consent Done: Emergent Situation  Procedure purpose: therapeutic  Indications: pneumothorax  Preparation: Patient was prepped and draped in the usual sterile fashion.  Local anesthesia used: no    Anesthesia:  Local anesthesia used: no  Patient sedated: no  Preparation: skin prepped with ChloraPrep  Patient position: left lateral decubitus  Ultrasound guidance: no  Location: right lateral  Intercostal space: 4th  Puncture method: over-the-needle catheter  Needle size: 24  Number of attempts: 1  Patient tolerance: Patient tolerated the procedure well with no immediate complications  Chest x-ray performed: yes  Chest x-ray interpreted by me.  Chest x-ray findings: normal findings  Complications: No    Critical Care  Date/Time: 10/7/2018 3:32 AM  Performed by: Dixie Christianson MD  Authorized by: Dixie Christianson MD   Direct patient critical care time: 15 minutes  Additional history critical care time: 10 minutes  Ordering / reviewing critical care time: 10 minutes  Documentation critical care time: 10 minutes  Consulting other physicians critical care time: 7 minutes  Consult with family critical care time: 8 minutes  Total critical care time (exclusive of procedural time) : 60 minutes  Critical care time was exclusive of separately billable procedures and treating other patients and teaching time.  Critical care was necessary to treat or prevent imminent or life-threatening deterioration of the following conditions: cardiac failure.  Critical care was time spent personally by me on the following activities: blood draw for specimens, development of treatment plan with patient or surrogate, discussions with consultants, interpretation of cardiac output measurements, evaluation of patient's response to treatment, examination of patient, obtaining history from patient or surrogate, ordering and performing treatments and interventions, ordering and review of laboratory studies, ordering and review of radiographic  "studies, re-evaluation of patient's condition, review of old charts, ventilator management and vascular access procedures.    Central Line  Date/Time: 10/7/2018 3:50 AM  Performed by: Dixie Christianson MD  Consent Done: Yes  Time out: Immediately prior to procedure a "time out" was called to verify the correct patient, procedure, equipment, support staff and site/side marked as required.  Indications: med administration  Preparation: skin prepped with ChloraPrep  Skin prep agent dried: skin prep agent completely dried prior to procedure  Sterile barriers: all five maximum sterile barriers used - cap, mask, sterile gown, sterile gloves, and large sterile sheet  Hand hygiene: hand hygiene performed prior to central venous catheter insertion  Location details: right internal jugular  Catheter type: triple lumen  Catheter size: 7 Fr  Ultrasound guidance: yes  Needle advanced into vessel with real time Ultrasound guidance.  Guidewire confirmed in vessel.  Sterile sheath used.  Number of attempts: 1  Assessment: placement verified by x-ray and successful placement  Complications: none  Post-procedure: line sutured,  sterile dressing applied and blood return through all ports  Complications: No        ED Vital Signs:  Vitals:    10/07/18 0306 10/07/18 0318 10/07/18 0325 10/07/18 0331   BP:   (!) 84/43 (!) 79/40   Pulse:  97 97 96   Resp:   (!) 27 (!) 22   SpO2:  100% 100% 100%   Weight: 71.8 kg (158 lb 3 oz)       10/07/18 0335 10/07/18 0346 10/07/18 0401 10/07/18 0416   BP:  (!) 76/40 (!) 69/35 (!) 80/46   Pulse: 92 86 83 78   Resp:  (!) 22 (!) 22 (!) 22   SpO2: 100% 100% 99% 100%   Weight:        10/07/18 0431 10/07/18 0433 10/07/18 0446 10/07/18 0501   BP: (!) 101/55  (!) 82/49 (!) 99/58   Pulse: 71 72 79 69   Resp: 10  10 (!) 22   SpO2: 100% 100% 99% 100%   Weight:        10/07/18 0506 10/07/18 0511 10/07/18 0516   BP:   (!) 101/57   Pulse: 69 69 68   Resp:   (!) 22   SpO2:  100% 100%   Weight:          Abnormal Lab " Results:  Labs Reviewed   CBC W/ AUTO DIFFERENTIAL - Abnormal; Notable for the following components:       Result Value    MCHC 29.8 (*)     Platelets 115 (*)     MPV 13.2 (*)     Lymph # 4.9 (*)     Lymph% 50.8 (*)     Mono% 3.8 (*)     All other components within normal limits   COMPREHENSIVE METABOLIC PANEL - Abnormal; Notable for the following components:    CO2 13 (*)     Glucose 298 (*)     Creatinine 1.6 (*)     Alkaline Phosphatase 144 (*)      (*)     ALT 59 (*)     Anion Gap 24 (*)     eGFR if  35 (*)     eGFR if non  30 (*)     All other components within normal limits   LACTIC ACID, PLASMA - Abnormal; Notable for the following components:    Lactate (Lactic Acid) >12.0 (*)     All other components within normal limits    Narrative:        LA critical result(s) called and verbal readback obtained from   Karin Han, 10/07/2018 02:48   URINALYSIS - Abnormal; Notable for the following components:    Protein, UA 2+ (*)     Glucose, UA 1+ (*)     Occult Blood UA 2+ (*)     All other components within normal limits   PROTIME-INR - Abnormal; Notable for the following components:    Prothrombin Time 13.4 (*)     INR 1.3 (*)     All other components within normal limits   URINALYSIS MICROSCOPIC - Abnormal; Notable for the following components:    RBC, UA 10 (*)     Bacteria, UA Few (*)     All other components within normal limits   ISTAT PROCEDURE - Abnormal; Notable for the following components:    POC PH 7.147 (*)     POC PCO2 58.5 (*)     POC PO2 619 (*)     POC HCO3 20.3 (*)     All other components within normal limits   ISTAT PROCEDURE - Abnormal; Notable for the following components:    POC PH 7.246 (*)     POC PCO2 45.5 (*)     POC PO2 290 (*)     POC HCO3 19.7 (*)     All other components within normal limits   CULTURE, BLOOD   CULTURE, BLOOD   TROPONIN I   PROCALCITONIN   DRUG SCREEN PANEL, URINE EMERGENCY   B-TYPE NATRIURETIC PEPTIDE   D DIMER, QUANTITATIVE     Narrative:      DDIMR  result(s) called and verbal readback obtained from LESTER YOUNGER , 10/07/2018 04:45        All Lab Results:  Results for orders placed or performed during the hospital encounter of 10/07/18   CBC auto differential   Result Value Ref Range    WBC 9.62 3.90 - 12.70 K/uL    RBC 4.51 4.00 - 5.40 M/uL    Hemoglobin 12.8 12.0 - 16.0 g/dL    Hematocrit 43.0 37.0 - 48.5 %    MCV 95 82 - 98 fL    MCH 28.4 27.0 - 31.0 pg    MCHC 29.8 (L) 32.0 - 36.0 g/dL    RDW 14.2 11.5 - 14.5 %    Platelets 115 (L) 150 - 350 K/uL    MPV 13.2 (H) 9.2 - 12.9 fL    Gran # (ANC) 3.9 1.8 - 7.7 K/uL    Lymph # 4.9 (H) 1.0 - 4.8 K/uL    Mono # 0.4 0.3 - 1.0 K/uL    Eos # 0.5 0.0 - 0.5 K/uL    Baso # 0.03 0.00 - 0.20 K/uL    Gran% 40.3 38.0 - 73.0 %    Lymph% 50.8 (H) 18.0 - 48.0 %    Mono% 3.8 (L) 4.0 - 15.0 %    Eosinophil% 4.8 0.0 - 8.0 %    Basophil% 0.3 0.0 - 1.9 %    Differential Method Automated    Comprehensive metabolic panel   Result Value Ref Range    Sodium 137 136 - 145 mmol/L    Potassium 4.1 3.5 - 5.1 mmol/L    Chloride 100 95 - 110 mmol/L    CO2 13 (L) 23 - 29 mmol/L    Glucose 298 (H) 70 - 110 mg/dL    BUN, Bld 17 8 - 23 mg/dL    Creatinine 1.6 (H) 0.5 - 1.4 mg/dL    Calcium 9.0 8.7 - 10.5 mg/dL    Total Protein 7.0 6.0 - 8.4 g/dL    Albumin 3.5 3.5 - 5.2 g/dL    Total Bilirubin 0.4 0.1 - 1.0 mg/dL    Alkaline Phosphatase 144 (H) 55 - 135 U/L     (H) 10 - 40 U/L    ALT 59 (H) 10 - 44 U/L    Anion Gap 24 (H) 8 - 16 mmol/L    eGFR if African American 35 (A) >60 mL/min/1.73 m^2    eGFR if non African American 30 (A) >60 mL/min/1.73 m^2   Troponin I   Result Value Ref Range    Troponin I <0.006 0.000 - 0.026 ng/mL   Procalcitonin   Result Value Ref Range    Procalcitonin 0.03 <0.25 ng/mL   Lactic acid, plasma   Result Value Ref Range    Lactate (Lactic Acid) >12.0 (HH) 0.5 - 2.2 mmol/L   Urinalysis - Cath.   Result Value Ref Range    Specimen UA Urine, Catheterized     Color, UA Yellow Yellow,  Straw, Yenny    Appearance, UA Clear Clear    pH, UA 7.0 5.0 - 8.0    Specific Gravity, UA 1.025 1.005 - 1.030    Protein, UA 2+ (A) Negative    Glucose, UA 1+ (A) Negative    Ketones, UA Negative Negative    Bilirubin (UA) Negative Negative    Occult Blood UA 2+ (A) Negative    Nitrite, UA Negative Negative    Urobilinogen, UA 1.0 <2.0 EU/dL    Leukocytes, UA Negative Negative   Drug screen panel, emergency   Result Value Ref Range    Benzodiazepines Negative     Methadone metabolites Negative     Cocaine (Metab.) Negative     Opiate Scrn, Ur Negative     Barbiturate Screen, Ur Negative     Amphetamine Screen, Ur Negative     THC Negative     Phencyclidine Negative     Creatinine, Random Ur 36.1 15.0 - 325.0 mg/dL    Toxicology Information SEE COMMENT    Protime-INR   Result Value Ref Range    Prothrombin Time 13.4 (H) 9.0 - 12.5 sec    INR 1.3 (H) 0.8 - 1.2   B-Type natriuretic peptide (BNP)   Result Value Ref Range    BNP 14 0 - 99 pg/mL   D dimer, quantitative   Result Value Ref Range    D-Dimer SEE COMMENT <0.50 mg/L FEU   Urinalysis Microscopic   Result Value Ref Range    RBC, UA 10 (H) 0 - 4 /hpf    WBC, UA 1 0 - 5 /hpf    Bacteria, UA Few (A) None-Occ /hpf    Hyaline Casts, UA 1 0-1/lpf /lpf    Microscopic Comment SEE COMMENT    ISTAT PROCEDURE   Result Value Ref Range    POC PH 7.147 (LL) 7.35 - 7.45    POC PCO2 58.5 (HH) 35 - 45 mmHg    POC PO2 619 (H) 80 - 100 mmHg    POC HCO3 20.3 (L) 24 - 28 mmol/L    POC BE -9 -2 to 2 mmol/L    POC SATURATED O2 100 95 - 100 %    Rate 16     Sample ARTERIAL     Site RR     Allens Test Pass     DelSys Adult Vent     Mode AC/PRVC     Vt 380     PEEP 5     FiO2 100    ISTAT PROCEDURE   Result Value Ref Range    POC PH 7.246 (LL) 7.35 - 7.45    POC PCO2 45.5 (H) 35 - 45 mmHg    POC PO2 290 (H) 80 - 100 mmHg    POC HCO3 19.7 (L) 24 - 28 mmol/L    POC BE -8 -2 to 2 mmol/L    POC SATURATED O2 100 95 - 100 %    Rate 22     Sample ARTERIAL     Site LR     Allens Test Pass      DelSys Adult Vent     Mode AC/PRVC     Vt 380     PEEP 5     FiO2 60        Imaging Results:  Imaging Results          X-Ray Chest 1 View (In process)                CT Chest Without Contrast (In process)                CT Head Without Contrast (In process)                X-Ray Chest 1 View (In process)                X-Ray Chest AP Portable (In process)               Per Virtual radiology, pt's CT Head results   1. No intracranial hemorrhage or other acute intracranial abnormality.  2. Extra-axial dural-based calcification versus calcified meningioma measuring 10 x 14 x 8 mm overlying the right superior frontal gyrus.  3. Chronic microvascular ischemia changes.  4. Right parietal scalp contusion.     Per [facility] radiology, pt's CT Chest results   1. Right anterior second through sixth rib fractures and sternum fracture presumably related to the reported chest compressions.  2. Thoracostomy tube, endotracheal tube, and enteric tube as described.  3. Small right pneumothorax status post thoracostomy tube placement.  3. Right lung contusions and pneumatocele.    Ordered, reviewed, and independently interpreted by the ED provider.  Study: X-ray Chest  Findings: ET tube in place. R pneumothorax.     Ordered, reviewed, and independently interpreted by the ED provider.  Study: X-ray Chest  Findings: Right lung inflated. Chest tube in place.    Ordered, reviewed, and independently interpreted by the ED provider.  Study: X-ray Chest  Findings: Central line in place      The EKG was ordered, reviewed, and independently interpreted by the ED provider.  Interpretation time: 0157  Rate: 123 BPM  Rhythm: sinus tachycardia  Interpretation: Septal infarct, age undetermined. Abnormal ECG. No STEMI.         The Emergency Provider reviewed the vital signs and test results, which are outlined above.    ED Discussion     2:00 AM: Chest x-ray reviewed by provider, R pneumothorax noted.    2:08 AM: Chest tube placed.    2:27 AM:  Re-evaluated pt. Patient remains stable. Patient being taken to CT.    2:28 AM: Patient's  arrived to ED. Discussed patient's case with .  denies any recent illness.  does not want to discuss advance directives at this time.    2:43 AM: Patient's son arrived to ED. Discussed patient's case with son.    2:47 AM: Re-evaluated patient. Patient remains unresponsive.    3:25 AM: Re-evaluated patient. Patient's BP is now 84/43. Will plan on central line.    3:35 AM: Discussed risks and benefits of central line placement with patient's .  signed consent.    3:50 AM: Central line placed.    4:15 AM:  and son at bedside. I have discussed test results, shared treatment plan, and the need for admission with patient's  and son.  and son express understanding at this time and agree with all information. All questions answered. Family has no further questions or concerns at this time.     4:21 AM: Dr. Christianson discussed the pt's case with Dr. Guthrie ([facility]) who states he will come evaluate the patient.    5:07 AM: Called to bedside by nurse. Family has lots of questions. Answered all questions. Family expresses understanding at this time.    5:24 AM: Dr. Guthrie at bedside.    5:20 AM: Discussed case with Dr. Guthrie ([facility]) who agrees with current care and management of pt and accepts admission.   Admitting Service: [facility] medicine   Admitting Physician: Dr. Guthrie  Admit to: ICU          ED Medication(s):  Medications   lorazepam (ATIVAN) injection 2 mg (2 mg Intravenous Not Given 10/7/18 0300)   vancomycin in dextrose 5 % 1 gram/250 mL IVPB 1,000 mg (1,000 mg Intravenous New Bag 10/7/18 8709)   norepinephrine 4 mg in dextrose 5% 250 mL infusion (premix) (titrating) (0.1 mcg/kg/min × 71.8 kg Intravenous Rate/Dose Change 10/7/18 8507)   hydrALAZINE injection 10 mg (10 mg Intravenous Given 10/7/18 7319)   sodium chloride 0.9% bolus 1,000 mL (0  mLs Intravenous Stopped 10/7/18 0334)   lorazepam (ATIVAN) 2 mg/mL injection (2 mg  Given 10/7/18 0300)   sodium chloride 0.9% bolus 2,154 mL (0 mL/kg × 71.8 kg Intravenous Stopped 10/7/18 0425)   piperacillin-tazobactam 4.5 g in dextrose 5 % 100 mL IVPB (ready to mix system) (4.5 g Intravenous New Bag 10/7/18 0430)   lorazepam (ATIVAN) injection 2 mg (2 mg Intravenous Given 10/7/18 0451)             Medical Decision Making    Medical Decision Making:   Clinical Tests:   Lab Tests: Ordered and Reviewed  Radiological Study: Ordered and Reviewed  Medical Tests: Ordered and Reviewed           Scribe Attestation:   Scribe #1: I performed the above scribed service and the documentation accurately describes the services I performed. I attest to the accuracy of the note.    Attending:   Physician Attestation Statement for Scribe #1: I, Dixie Christianson MD, personally performed the services described in this documentation, as scribed by Nkechi Vaz, in my presence, and it is both accurate and complete.          Clinical Impression       ICD-10-CM ICD-9-CM   1. Cardiac arrest I46.9 427.5   2. SOB (shortness of breath) R06.02 786.05   3. Chest tube in place Z96.89 V49.89   4. Encounter for central line placement Z45.2 V58.81       Disposition:   Disposition: Admitted (ICU)  Condition: Critical         Dixie Christianson MD  10/07/18 2014

## 2018-10-07 NOTE — ASSESSMENT & PLAN NOTE
Resume home Plavix, statin and ASA  Resume low dose Lopressor as BP tolerates  ICU cardiac monitoring  Echo pending

## 2018-10-07 NOTE — H&P
[facility] Hospital Medicine  History & Physical    Patient Name: Charline Messer  MRN: [id number]  Admission Date: 10/7/2018  Attending Physician: Low Guthrie MD  Primary Care Provider: Primary Doctor No         Patient information was obtained from spouse/SO, past medical records and ER records.     Subjective:     Principal Problem:Cardiac arrest    Chief Complaint:   Chief Complaint   Patient presents with    Cardiac Arrest        HPI: Ms. Messer is a 83-year-old  female with PMH significant for HTN, CAD, was working at [employer] sn[employer], when she had a witnessed cardiac arrest at around midnight.  Patient was complaining of shortness of breath and collapsed.  Medical staff immediately started CPR, received two rounds of epinephrine, one round of atropine.  She was intubated on the scene and brought to the ED.  GCS upon arrival 3.  No corneal reflex, no gag reflex, not breathing over the vent.  Laboratory workup reveals lactic acid greater than 12, hypotensive with systolic in the 80s, initiated on Levophed drip.  Currently not on sedation except for IV Ativan as needed.  Chest x-ray reveals sternal fracture and multiple anterior rib fractures from the CPR earlier today.  CT head negative for ischemia or hemorrhage.  Patient had a pneumothorax, chest tube placed in the ED. Patient's  and son at the bedside hysterical.  Do not want to discuss any further resuscitative measures.  Patient remains full code at this time.  Patient to be initiated on hypothermia protocol.    Past Medical History:   Diagnosis Date    Allergic rhinitis, cause unspecified     Arthritis     Benign colonic polyp     Breast cancer mid 1980s    right; per patient s/p right mastectomy and chemo, unsure about radiation    CHF (congestive heart failure)     Coronary artery disease     Coronary artery disease involving native coronary artery of native heart without angina pectoris 8/2/2017     Depression     Diverticular disease     External hemorrhoids     Gout attack     Hyperlipidemia     Hypertension     Hypothyroidism     Obesity (BMI 30-39.9) 10/24/2016    Osteoarthritis     Peripheral vascular disease     Postmenopausal     Pulmonary embolism     reported per patient; unsure date    ST elevation myocardial infarction (STEMI) of anterior wall 6/27/2017    Thrombocytopenia     Tinnitus aurium     Vitamin D deficiency disease        Past Surgical History:   Procedure Laterality Date    BREAST SURGERY      CARDIAC CATHETERIZATION      HEART CATH-LEFT Left 6/27/2017    Performed by Carlos [facility]er [facility] at [facility] CATH LAB    PARTIAL HYSTERECTOMY      Due to fibroids    Right mastectomy         Review of patient's allergies indicates:   Allergen Reactions    Codeine Nausea And Vomiting    Ibuprofen Hives and Itching           Zetia [ezetimibe] Nausea And Vomiting       Current Facility-Administered Medications on File Prior to Encounter   Medication    lidocaine-EPINEPHrine 1%-1:100,000 injection 5 mL     Current Outpatient Medications on File Prior to Encounter   Medication Sig    albuterol 90 mcg/actuation inhaler Inhale 1-2 puffs into the lungs every 6 (six) hours as needed.     aspirin (ECOTRIN) 81 MG EC tablet Take 1 tablet by mouth Daily.    atorvastatin (LIPITOR) 80 MG tablet TAKE ONE TABLET BY MOUTH ONCE DAILY    clopidogrel (PLAVIX) 75 mg tablet TAKE ONE TABLET BY MOUTH ONCE DAILY    colchicine 0.6 mg tablet Take 1 pill daily prn    cyclobenzaprine (FLEXERIL) 10 MG tablet 1/2 to one tab po qhs prn muscle spasm    fluticasone (FLONASE) 50 mcg/actuation nasal spray 2 sprays by Each Nare route daily as needed for Rhinitis.    furosemide (LASIX) 20 MG tablet Take 1 tablet by mouth Daily. prn (Patient taking differently: Take 20 mg by mouth as needed. Take 1 tablet by mouth Daily. prn)    isosorbide mononitrate (IMDUR) 30 MG 24 hr tablet TAKE ONE TABLET BY MOUTH ONCE DAILY     metoprolol tartrate (LOPRESSOR) 50 MG tablet Take 1 tablet (50 mg total) by mouth 2 (two) times daily.     Family History     Problem Relation (Age of Onset)    Heart disease Mother, Sister    Hypertension Mother, Father    No Known Problems Son    Stroke Mother        Tobacco Use    Smoking status: Never Smoker    Smokeless tobacco: Never Used   Substance and Sexual Activity    Alcohol use: Yes     Comment: ocassionally    Drug use: No    Sexual activity: No     Review of Systems   Unable to perform ROS: Patient unresponsive     Objective:     Vital Signs (Most Recent):  Temp: (!) 92.9 °F (33.8 °C) (10/07/18 0531)  Pulse: 66 (10/07/18 0531)  Resp: 16 (10/07/18 0531)  BP: (!) 104/58 (10/07/18 0531)  SpO2: 100 % (10/07/18 0531) Vital Signs (24h Range):  Temp:  [92.9 °F (33.8 °C)] 92.9 °F (33.8 °C)  Pulse:  [] 66  Resp:  [10-29] 16  SpO2:  [94 %-100 %] 100 %  BP: ()/() 104/58     Weight: 71.8 kg (158 lb 3 oz)  Body mass index is 30.89 kg/m².    Physical Exam   Constitutional: She is intubated.   HENT:   Head: Normocephalic and atraumatic.   Eyes: Conjunctivae are normal. No scleral icterus. Right pupil is not reactive. Left pupil is not reactive.   Cardiovascular: Normal rate and regular rhythm.   No murmur heard.  Pulmonary/Chest: She is intubated. She has no wheezes. She has no rhonchi.   Abdominal: Soft. No hernia.   Musculoskeletal: She exhibits no edema.   Lymphadenopathy:     She has no cervical adenopathy.   Neurological: She is unresponsive. She exhibits normal muscle tone. GCS eye subscore is 1. GCS verbal subscore is 1. GCS motor subscore is 1.   GCS 3. Not on propofol drip. But on ativan IV as needed.   Skin: Skin is warm. No erythema.   Psychiatric:   Unable to evaluate   Nursing note and vitals reviewed.          Significant Labs:   ABGs:   Recent Labs   Lab  10/07/18   0330   PH  7.246*   PCO2  45.5*   HCO3  19.7*   POCSATURATED  100   BE  -8     BMP:   Recent Labs   Lab   10/07/18   0204   GLU  298*   NA  137   K  4.1   CL  100   CO2  13*   BUN  17   CREATININE  1.6*   CALCIUM  9.0     CBC:   Recent Labs   Lab  10/07/18   0204   WBC  9.62   HGB  12.8   HCT  43.0   PLT  115*     CMP:   Recent Labs   Lab  10/07/18   0204   NA  137   K  4.1   CL  100   CO2  13*   GLU  298*   BUN  17   CREATININE  1.6*   CALCIUM  9.0   PROT  7.0   ALBUMIN  3.5   BILITOT  0.4   ALKPHOS  144*   AST  105*   ALT  59*   ANIONGAP  24*   EGFRNONAA  30*     Cardiac Markers:   Recent Labs   Lab  10/07/18   0321   BNP  14     Lactic Acid:   Recent Labs   Lab  10/07/18   0204   LACTATE  >12.0*     Magnesium: No results for input(s): MG in the last 48 hours.  Troponin:   Recent Labs   Lab  10/07/18   0204   TROPONINI  <0.006     Urine Studies:   Recent Labs   Lab  10/07/18   0320   COLORU  Yellow   APPEARANCEUA  Clear   PHUR  7.0   SPECGRAV  1.025   PROTEINUA  2+*   GLUCUA  1+*   KETONESU  Negative   BILIRUBINUA  Negative   OCCULTUA  2+*   NITRITE  Negative   UROBILINOGEN  1.0   LEUKOCYTESUR  Negative   RBCUA  10*   WBCUA  1   BACTERIA  Few*   HYALINECASTS  1     All pertinent labs within the past 24 hours have been reviewed.    Significant Imaging: I have reviewed and interpreted all pertinent imaging results/findings within the past 24 hours.     Imaging Results          X-Ray Chest 1 View (In process)                CT Chest Without Contrast (In process)                CT Head Without Contrast (In process)                X-Ray Chest 1 View (In process)                X-Ray Chest AP Portable (In process)               Per Virtual radiology, pt's CT Head results   1. No intracranial hemorrhage or other acute intracranial abnormality.  2. Extra-axial dural-based calcification versus calcified meningioma measuring 10 x 14 x 8 mm overlying the right superior frontal gyrus.  3. Chronic microvascular ischemia changes.  4. Right parietal scalp contusion.      Per Virtual radiology, pt's CT Chest results   1. Right  anterior second through sixth rib fractures and sternum fracture presumably related to the reported chest compressions.  2. Thoracostomy tube, endotracheal tube, and enteric tube as described.  3. Small right pneumothorax status post thoracostomy tube placement.  3. Right lung contusions and pneumatocele.     Ordered, reviewed, and independently interpreted.  Study: X-ray Chest  Findings: ET tube in place. Central line in place. Right lung inflated. R pneumothorax.          I have independently reviewed and interpreted the EKG.    I have independently reviewed all pertinent labs within the past 24 hours.    I have independently reviewed, visualized and interpreted all pertinent imaging results within the past 24 hours and discussed the findings with the ED physician, Dr. Christianson.            Assessment/Plan:     * Cardiac arrest    Unclear etiology.  CPR done for approximately 30 min per nursing staff.  Initial rhythm was bradycardic, received atropine and epinephrine with return of spontaneous circulation after two rounds of epi.  Currently GCS 3, unresponsive.  No corneal, no gag reflex.  Note on propofol drip.  Not breathing over the vent.  To be initiated on hypothermia protocol.  Discussed with Dr. Christianson, ED physician.  Pulmonary, Neurology consulted.              Acute hypoxemic respiratory failure    Intubated, not breathing over the vent.  ABGs reviewed.  Pulmonary consult for vent management.          Lactic acidemia    Lactic acid greater than 12.  Secondary to cardiac arrest.  Repeat every 4 hr x2.  Continue IV vancomycin, IV Zosyn empirically.  Follow up on cultures.          Closed fracture of multiple ribs of right side    Sternal fracture, multiple rib fractures as a result of CP earlier today.  Per family, patient remains full code at this time.  They are unwilling to discuss further code status.          VTE Risk Mitigation (From admission, onward)        Ordered     IP VTE HIGH RISK PATIENT  Once       10/07/18 0559     Place sequential compression device  Until discontinued      10/07/18 0559     Place sequential compression device  Until discontinued      10/07/18 0541        Critical care time: 45 minutes.  Critical care time was exclusive of separately billable procedures and treating other patients.     Low Guthrie MD  Department of Hospital Medicine   [facility]

## 2018-10-07 NOTE — PROGRESS NOTES
Called to see patient for emergent chest tube insertion. Pt is s/p cardiac arrest who is on hypothermia protocol. Pt was noticed to have a Rt. pneumothrax in the ED. Chest tube placed in the ED was in the subcut space. Pt is intubated and sedated on propofol.    Chest tube placed in the Rt pleural space emergently.

## 2018-10-07 NOTE — ED NOTES
Central line placement confirmed by Dr Christianson with xray. Ok to begin using central line for infusions.

## 2018-10-07 NOTE — ASSESSMENT & PLAN NOTE
Likely anoxic injury  TTM started  Consult Neuro post TTM  ICU neuro monitoring  Supportive care  Propofol for TTM and possible underlying seizures  Check EEG if posturing persist on Propofol infusion  CT head unremarkable in ED for acute process

## 2018-10-07 NOTE — ASSESSMENT & PLAN NOTE
Chest tube displaced  Ask surgery to replace right chest tube  Blood and sputum cultures pending  Add Zosyn  1 dose Vanc  Follow fever curve and repeat CBC in AM

## 2018-10-07 NOTE — ED NOTES
Dr Christianson explained need, risks, benefits, and procedure of central line to pt's spouse. Spouse and Dr Christianson signed consent at this time. I signed as witness.

## 2018-10-07 NOTE — HPI
Ms Messer is a 82 yo BF with a PMH of CAD, CHF, HTN, HLD, Hypothyroidism, PAD, OA and Right Breast CA.  Per EMS, patient was working at [employer] [facility] [employer] last night when coworkers noticed patient was having difficulty breathing and then collapsed. EMS reports when they arrived patient was bradycardic in 40s and then shortly after pulse was lost. EMS and FD initiated CPR and administered epi and regained pulse. EMS reports shortly after, pulse was lost and another dose of epi was administered and regained pulse. Patient was intubated on scene with 7.0 ET tube. She was presented to [facility] ED about 0230 hr this AM w/ GCS 3, LA > 12, SBP in 80s and started on Levophed infusion once CL obtained.  CT head unremarkable and CT chest with multiple right rib fractures.  She had right tension PTX and chest tube placed.

## 2018-10-07 NOTE — ED NOTES
Chest xray reviewed, central line placement unclear, central line infusions paused.  Hospital medicine paged.  Dr. Redd returned page, central line placement review and pt evaluation requested.  States placement of central line is not good.  Will see pt.

## 2018-10-07 NOTE — ED NOTES
Directives discussed by Dr. Redd with pt family.  Dr. Redd states pt is a DNR.  DNR form to be filled out upon arrival to ICU.

## 2018-10-07 NOTE — HPI
Ms. Messer is a 83-year-old  female with PMH significant for HTN, CAD, was working at [employer], when she had a witnessed cardiac arrest at around midnight.  Patient was complaining of shortness of breath and collapsed.  Medical staff immediately started CPR, received two rounds of epinephrine, one round of atropine.  She was intubated on the scene and brought to the ED.  GCS upon arrival 3.  No corneal reflex, no gag reflex, not breathing over the vent.  Laboratory workup reveals lactic acid greater than 12, hypotensive with systolic in the 80s, initiated on Levophed drip.  Currently not on sedation except for IV Ativan as needed.  Chest x-ray reveals sternal fracture and multiple anterior rib fractures from the CPR earlier today.  CT head negative for ischemia or hemorrhage.  Patient had a pneumothorax, chest tube placed in the ED. Patient's  and son at the bedside hysterical.  Do not want to discuss any further resuscitative measures.  Patient remains full code at this time.  Patient to be initiated on hypothermia protocol.

## 2018-10-07 NOTE — ASSESSMENT & PLAN NOTE
Cont full vent support  Vent settings reviewed and adjusted multiple times  VAP prophylaxis  SAT/SBT post TTM and neuro improvement

## 2018-10-08 PROBLEM — E87.20 METABOLIC ACIDOSIS: Status: ACTIVE | Noted: 2018-10-07

## 2018-10-08 LAB
ALBUMIN SERPL BCP-MCNC: 2.5 G/DL
ALLENS TEST: ABNORMAL
ALP SERPL-CCNC: 109 U/L
ALT SERPL W/O P-5'-P-CCNC: 58 U/L
ANION GAP SERPL CALC-SCNC: 10 MMOL/L
ANION GAP SERPL CALC-SCNC: 11 MMOL/L
ANION GAP SERPL CALC-SCNC: 12 MMOL/L
ANION GAP SERPL CALC-SCNC: 9 MMOL/L
APTT BLDCRRT: 27.3 SEC
AST SERPL-CCNC: 69 U/L
BACTERIA UR CULT: NO GROWTH
BASOPHILS # BLD AUTO: 0.01 K/UL
BASOPHILS # BLD AUTO: 0.01 K/UL
BASOPHILS NFR BLD: 0.1 %
BASOPHILS NFR BLD: 0.1 %
BILIRUB SERPL-MCNC: 0.4 MG/DL
BUN SERPL-MCNC: 18 MG/DL
BUN SERPL-MCNC: 19 MG/DL
BUN SERPL-MCNC: 20 MG/DL
BUN SERPL-MCNC: 20 MG/DL
CALCIUM SERPL-MCNC: 6.8 MG/DL
CALCIUM SERPL-MCNC: 6.9 MG/DL
CALCIUM SERPL-MCNC: 7 MG/DL
CALCIUM SERPL-MCNC: 7.1 MG/DL
CALCIUM SERPL-MCNC: 7.1 MG/DL
CALCIUM SERPL-MCNC: 7.3 MG/DL
CHLORIDE SERPL-SCNC: 107 MMOL/L
CHLORIDE SERPL-SCNC: 107 MMOL/L
CHLORIDE SERPL-SCNC: 109 MMOL/L
CHLORIDE SERPL-SCNC: 110 MMOL/L
CO2 SERPL-SCNC: 13 MMOL/L
CO2 SERPL-SCNC: 14 MMOL/L
CO2 SERPL-SCNC: 14 MMOL/L
CO2 SERPL-SCNC: 15 MMOL/L
CO2 SERPL-SCNC: 15 MMOL/L
CO2 SERPL-SCNC: 17 MMOL/L
CREAT SERPL-MCNC: 0.7 MG/DL
CREAT SERPL-MCNC: 0.8 MG/DL
DELSYS: ABNORMAL
DIFFERENTIAL METHOD: ABNORMAL
DIFFERENTIAL METHOD: ABNORMAL
EOSINOPHIL # BLD AUTO: 0 K/UL
EOSINOPHIL # BLD AUTO: 0.2 K/UL
EOSINOPHIL NFR BLD: 0.2 %
EOSINOPHIL NFR BLD: 1 %
ERYTHROCYTE [DISTWIDTH] IN BLOOD BY AUTOMATED COUNT: 13.7 %
ERYTHROCYTE [DISTWIDTH] IN BLOOD BY AUTOMATED COUNT: 14.2 %
ERYTHROCYTE [SEDIMENTATION RATE] IN BLOOD BY WESTERGREN METHOD: 18 MM/H
EST. GFR  (AFRICAN AMERICAN): >60 ML/MIN/1.73 M^2
EST. GFR  (NON AFRICAN AMERICAN): >60 ML/MIN/1.73 M^2
FIO2: 35
GLUCOSE SERPL-MCNC: 102 MG/DL
GLUCOSE SERPL-MCNC: 116 MG/DL
GLUCOSE SERPL-MCNC: 120 MG/DL
GLUCOSE SERPL-MCNC: 122 MG/DL
GLUCOSE SERPL-MCNC: 140 MG/DL
GLUCOSE SERPL-MCNC: 160 MG/DL
HCO3 UR-SCNC: 15.3 MMOL/L (ref 24–28)
HCO3 UR-SCNC: 16.1 MMOL/L (ref 24–28)
HCO3 UR-SCNC: 17.5 MMOL/L (ref 24–28)
HCO3 UR-SCNC: 17.6 MMOL/L (ref 24–28)
HCT VFR BLD AUTO: 26.9 %
HCT VFR BLD AUTO: 30.8 %
HGB BLD-MCNC: 10 G/DL
HGB BLD-MCNC: 8.8 G/DL
INR PPP: 1.1
IP: 23
IT: 0.71
LYMPHOCYTES # BLD AUTO: 0.5 K/UL
LYMPHOCYTES # BLD AUTO: 0.6 K/UL
LYMPHOCYTES NFR BLD: 3.3 %
LYMPHOCYTES NFR BLD: 3.8 %
MAGNESIUM SERPL-MCNC: 1.9 MG/DL
MAGNESIUM SERPL-MCNC: 2 MG/DL
MAGNESIUM SERPL-MCNC: 2 MG/DL
MAGNESIUM SERPL-MCNC: 2.1 MG/DL
MAGNESIUM SERPL-MCNC: 2.3 MG/DL
MAGNESIUM SERPL-MCNC: 2.4 MG/DL
MAP: 9.8
MCH RBC QN AUTO: 27.7 PG
MCH RBC QN AUTO: 27.8 PG
MCHC RBC AUTO-ENTMCNC: 32.5 G/DL
MCHC RBC AUTO-ENTMCNC: 32.7 G/DL
MCV RBC AUTO: 85 FL
MCV RBC AUTO: 86 FL
MODE: ABNORMAL
MONOCYTES # BLD AUTO: 0.4 K/UL
MONOCYTES # BLD AUTO: 0.4 K/UL
MONOCYTES NFR BLD: 2.5 %
MONOCYTES NFR BLD: 2.7 %
NEUTROPHILS # BLD AUTO: 14.3 K/UL
NEUTROPHILS # BLD AUTO: 15.2 K/UL
NEUTROPHILS NFR BLD: 92.7 %
NEUTROPHILS NFR BLD: 93.9 %
PCO2 BLDA: 29.4 MMHG (ref 35–45)
PCO2 BLDA: 29.4 MMHG (ref 35–45)
PCO2 BLDA: 29.6 MMHG (ref 35–45)
PCO2 BLDA: 33.2 MMHG (ref 35–45)
PEEP: 5
PH SMN: 7.27 [PH] (ref 7.35–7.45)
PH SMN: 7.35 [PH] (ref 7.35–7.45)
PH SMN: 7.38 [PH] (ref 7.35–7.45)
PH SMN: 7.38 [PH] (ref 7.35–7.45)
PHOSPHATE SERPL-MCNC: 2.6 MG/DL
PHOSPHATE SERPL-MCNC: 2.6 MG/DL
PHOSPHATE SERPL-MCNC: 2.9 MG/DL
PHOSPHATE SERPL-MCNC: 3 MG/DL
PHOSPHATE SERPL-MCNC: 3.4 MG/DL
PHOSPHATE SERPL-MCNC: 4.4 MG/DL
PIP: 28
PIP: 28
PLATELET # BLD AUTO: 111 K/UL
PLATELET # BLD AUTO: 129 K/UL
PMV BLD AUTO: 12.3 FL
PMV BLD AUTO: 12.7 FL
PO2 BLDA: 124 MMHG (ref 80–100)
PO2 BLDA: 125 MMHG (ref 80–100)
PO2 BLDA: 144 MMHG (ref 80–100)
PO2 BLDA: 156 MMHG (ref 80–100)
POC BE: -10 MMOL/L
POC BE: -12 MMOL/L
POC BE: -7 MMOL/L
POC BE: -8 MMOL/L
POC SATURATED O2: 98 % (ref 95–100)
POC SATURATED O2: 99 % (ref 95–100)
POCT GLUCOSE: 109 MG/DL (ref 70–110)
POCT GLUCOSE: 117 MG/DL (ref 70–110)
POCT GLUCOSE: 140 MG/DL (ref 70–110)
POCT GLUCOSE: 141 MG/DL (ref 70–110)
POCT GLUCOSE: 92 MG/DL (ref 70–110)
POCT GLUCOSE: 99 MG/DL (ref 70–110)
POTASSIUM SERPL-SCNC: 3.8 MMOL/L
POTASSIUM SERPL-SCNC: 4 MMOL/L
POTASSIUM SERPL-SCNC: 4.2 MMOL/L
POTASSIUM SERPL-SCNC: 4.4 MMOL/L
POTASSIUM SERPL-SCNC: 4.6 MMOL/L
POTASSIUM SERPL-SCNC: 4.9 MMOL/L
PROT SERPL-MCNC: 5.1 G/DL
PROTHROMBIN TIME: 11.5 SEC
RBC # BLD AUTO: 3.18 M/UL
RBC # BLD AUTO: 3.6 M/UL
SAMPLE: ABNORMAL
SITE: ABNORMAL
SODIUM SERPL-SCNC: 133 MMOL/L
SODIUM SERPL-SCNC: 133 MMOL/L
SODIUM SERPL-SCNC: 134 MMOL/L
SODIUM SERPL-SCNC: 135 MMOL/L
SP02: 100
VT: 437
WBC # BLD AUTO: 15.43 K/UL
WBC # BLD AUTO: 16.15 K/UL

## 2018-10-08 PROCEDURE — 97802 MEDICAL NUTRITION INDIV IN: CPT

## 2018-10-08 PROCEDURE — 80053 COMPREHEN METABOLIC PANEL: CPT

## 2018-10-08 PROCEDURE — 85025 COMPLETE CBC W/AUTO DIFF WBC: CPT | Mod: 91

## 2018-10-08 PROCEDURE — 63600175 PHARM REV CODE 636 W HCPCS: Performed by: NURSE PRACTITIONER

## 2018-10-08 PROCEDURE — 84100 ASSAY OF PHOSPHORUS: CPT | Mod: 91

## 2018-10-08 PROCEDURE — 37799 UNLISTED PX VASCULAR SURGERY: CPT

## 2018-10-08 PROCEDURE — 25000003 PHARM REV CODE 250: Performed by: FAMILY MEDICINE

## 2018-10-08 PROCEDURE — 99900026 HC AIRWAY MAINTENANCE (STAT)

## 2018-10-08 PROCEDURE — 85610 PROTHROMBIN TIME: CPT

## 2018-10-08 PROCEDURE — S0028 INJECTION, FAMOTIDINE, 20 MG: HCPCS | Performed by: NURSE PRACTITIONER

## 2018-10-08 PROCEDURE — 83735 ASSAY OF MAGNESIUM: CPT

## 2018-10-08 PROCEDURE — 99900035 HC TECH TIME PER 15 MIN (STAT)

## 2018-10-08 PROCEDURE — 99291 CRITICAL CARE FIRST HOUR: CPT | Mod: ,,, | Performed by: NURSE PRACTITIONER

## 2018-10-08 PROCEDURE — 80048 BASIC METABOLIC PNL TOTAL CA: CPT | Mod: 91

## 2018-10-08 PROCEDURE — 80048 BASIC METABOLIC PNL TOTAL CA: CPT

## 2018-10-08 PROCEDURE — 63600175 PHARM REV CODE 636 W HCPCS: Performed by: INTERNAL MEDICINE

## 2018-10-08 PROCEDURE — 25000003 PHARM REV CODE 250: Performed by: STUDENT IN AN ORGANIZED HEALTH CARE EDUCATION/TRAINING PROGRAM

## 2018-10-08 PROCEDURE — 99199 UNLISTED SPECIAL SVC PX/RPRT: CPT

## 2018-10-08 PROCEDURE — 25000003 PHARM REV CODE 250: Performed by: NURSE PRACTITIONER

## 2018-10-08 PROCEDURE — 99223 1ST HOSP IP/OBS HIGH 75: CPT | Mod: ,,, | Performed by: PSYCHIATRY & NEUROLOGY

## 2018-10-08 PROCEDURE — 94003 VENT MGMT INPAT SUBQ DAY: CPT

## 2018-10-08 PROCEDURE — 20000000 HC ICU ROOM

## 2018-10-08 PROCEDURE — 82803 BLOOD GASES ANY COMBINATION: CPT

## 2018-10-08 PROCEDURE — 83735 ASSAY OF MAGNESIUM: CPT | Mod: 91

## 2018-10-08 PROCEDURE — 25000003 PHARM REV CODE 250: Performed by: INTERNAL MEDICINE

## 2018-10-08 PROCEDURE — 85730 THROMBOPLASTIN TIME PARTIAL: CPT

## 2018-10-08 RX ORDER — NOREPINEPHRINE BITARTRATE/D5W 4MG/250ML
0.02 PLASTIC BAG, INJECTION (ML) INTRAVENOUS CONTINUOUS
Status: DISCONTINUED | OUTPATIENT
Start: 2018-10-08 | End: 2018-10-09

## 2018-10-08 RX ORDER — HEPARIN SODIUM 5000 [USP'U]/ML
5000 INJECTION, SOLUTION INTRAVENOUS; SUBCUTANEOUS EVERY 12 HOURS
Status: DISCONTINUED | OUTPATIENT
Start: 2018-10-08 | End: 2018-10-08

## 2018-10-08 RX ADMIN — ATORVASTATIN CALCIUM 80 MG: 40 TABLET, FILM COATED ORAL at 08:10

## 2018-10-08 RX ADMIN — CHLORHEXIDINE GLUCONATE 15 ML: 1.2 RINSE ORAL at 08:10

## 2018-10-08 RX ADMIN — FAMOTIDINE 20 MG: 10 INJECTION, SOLUTION INTRAVENOUS at 08:10

## 2018-10-08 RX ADMIN — ACETAMINOPHEN 650 MG: 160 SOLUTION ORAL at 05:10

## 2018-10-08 RX ADMIN — SODIUM PHOSPHATE, MONOBASIC, MONOHYDRATE 15 MMOL: 276; 142 INJECTION, SOLUTION INTRAVENOUS at 06:10

## 2018-10-08 RX ADMIN — SODIUM BICARBONATE: 84 INJECTION, SOLUTION INTRAVENOUS at 09:10

## 2018-10-08 RX ADMIN — PIPERACILLIN SODIUM AND TAZOBACTAM SODIUM 4.5 G: 4; .5 INJECTION, POWDER, LYOPHILIZED, FOR SOLUTION INTRAVENOUS at 08:10

## 2018-10-08 RX ADMIN — Medication 0.02 MCG/KG/MIN: at 02:10

## 2018-10-08 RX ADMIN — PROPOFOL 50 MCG/KG/MIN: 10 INJECTION, EMULSION INTRAVENOUS at 02:10

## 2018-10-08 RX ADMIN — PIPERACILLIN AND TAZOBACTAM 4.5 G: 4; .5 INJECTION, POWDER, LYOPHILIZED, FOR SOLUTION INTRAVENOUS; PARENTERAL at 04:10

## 2018-10-08 RX ADMIN — POTASSIUM CHLORIDE 40 MEQ: 400 INJECTION, SOLUTION INTRAVENOUS at 02:10

## 2018-10-08 RX ADMIN — PROPOFOL 50 MCG/KG/MIN: 10 INJECTION, EMULSION INTRAVENOUS at 10:10

## 2018-10-08 RX ADMIN — PIPERACILLIN SODIUM AND TAZOBACTAM SODIUM 4.5 G: 4; .5 INJECTION, POWDER, LYOPHILIZED, FOR SOLUTION INTRAVENOUS at 12:10

## 2018-10-08 RX ADMIN — PROPOFOL 50 MCG/KG/MIN: 10 INJECTION, EMULSION INTRAVENOUS at 07:10

## 2018-10-08 RX ADMIN — METOPROLOL TARTRATE 12.5 MG: 25 TABLET, FILM COATED ORAL at 08:10

## 2018-10-08 RX ADMIN — ACETAMINOPHEN 650 MG: 160 SOLUTION ORAL at 12:10

## 2018-10-08 RX ADMIN — SODIUM PHOSPHATE, MONOBASIC, MONOHYDRATE 15 MMOL: 276; 142 INJECTION, SOLUTION INTRAVENOUS at 02:10

## 2018-10-08 RX ADMIN — SODIUM BICARBONATE: 84 INJECTION, SOLUTION INTRAVENOUS at 06:10

## 2018-10-08 RX ADMIN — CLOPIDOGREL BISULFATE 75 MG: 75 TABLET ORAL at 08:10

## 2018-10-08 RX ADMIN — VANCOMYCIN HYDROCHLORIDE 1000 MG: 1 INJECTION, POWDER, LYOPHILIZED, FOR SOLUTION INTRAVENOUS at 04:10

## 2018-10-08 RX ADMIN — POTASSIUM CHLORIDE 40 MEQ: 400 INJECTION, SOLUTION INTRAVENOUS at 05:10

## 2018-10-08 RX ADMIN — ASPIRIN 81 MG CHEWABLE TABLET 81 MG: 81 TABLET CHEWABLE at 08:10

## 2018-10-08 NOTE — PROGRESS NOTES
[facility] Hospital Medicine  Progress Note    Patient Name: Charline Messer  MRN: [id number]  Patient Class: IP- Inpatient   Admission Date: 10/7/2018  Length of Stay: 1 days  Attending Physician: Jose Eduardo Meek, *  Primary Care Provider: Primary Doctor No        Subjective:     Principal Problem:Hypoxic encephalopathy    HPI:  Ms. Messer is a 83-year-old  female with PMH significant for HTN, CAD, was working at [employer] halfway, when she had a witnessed cardiac arrest at around midnight.  Patient was complaining of shortness of breath and collapsed.  Medical staff immediately started CPR, received two rounds of epinephrine, one round of atropine.  She was intubated on the scene and brought to the ED.  GCS upon arrival 3.  No corneal reflex, no gag reflex, not breathing over the vent.  Laboratory workup reveals lactic acid greater than 12, hypotensive with systolic in the 80s, initiated on Levophed drip.  Currently not on sedation except for IV Ativan as needed.  Chest x-ray reveals sternal fracture and multiple anterior rib fractures from the CPR earlier today.  CT head negative for ischemia or hemorrhage.  Patient had a pneumothorax, chest tube placed in the ED. Patient's  and son at the bedside hysterical.  Do not want to discuss any further resuscitative measures.  Patient remains full code at this time.  Patient to be initiated on hypothermia protocol.    Hospital Course:  82 y/o aaf admitted to icu  On mechanical ventilation   After cardiac arrest .  She was started on hypothermic  Protocol. Pt is s/p right side chest tube  2/2/ to  Traumatic pneumothorax . Patient with no cough, gag or corneal reflexes  .  There is no motor , sensory  Or painful response  . The TTE  Show  Normal EF , no WMA  And mild PHT . Pt was started on Cardene drip  Due to elevated bp    sbp > 200 . Pt  Became hypotensive and levophed was started to keep a map > 65 .         Interval  History: Pt was seen and examined at bedside . She is requesting mechanical ventilation  . There is no neurological changes . Pt was started on vasopressor support to keep a map > 65     Review of Systems   Unable to perform ROS: Intubated     Objective:     Vital Signs (Most Recent):  Temp: (!) 91.4 °F (33 °C) (10/08/18 0900)  Pulse: (!) 56 (10/08/18 0926)  Resp: 18 (10/08/18 0926)  BP: (!) 156/56 (10/08/18 0851)  SpO2: 100 % (10/08/18 0926) Vital Signs (24h Range):  Temp:  [90 °F (32.2 °C)-93.2 °F (34 °C)] 91.4 °F (33 °C)  Pulse:  [45-73] 56  Resp:  [5-32] 18  SpO2:  [94 %-100 %] 100 %  BP: ()/(37-76) 156/56     Weight: 76.8 kg (169 lb 5 oz)  Body mass index is 33.24 kg/m².    Intake/Output Summary (Last 24 hours) at 10/8/2018 1003  Last data filed at 10/8/2018 0904  Gross per 24 hour   Intake 3603.09 ml   Output 1210 ml   Net 2393.09 ml      Physical Exam   Constitutional: She appears well-developed and well-nourished. She has a sickly appearance. No distress. She is intubated.   HENT:   Head: Normocephalic and atraumatic.   Mouth/Throat: Oropharynx is clear and moist.   Eyes: Conjunctivae and lids are normal. Pupils are equal, round, and reactive to light.   Neck: Trachea normal. Neck supple. No JVD present. Carotid bruit is not present. No tracheal deviation present. No thyroid mass and no thyromegaly present.       Cardiovascular: Normal rate, regular rhythm and intact distal pulses.   Pulses:       Radial pulses are 1+ on the right side, and 1+ on the left side.        Dorsalis pedis pulses are 1+ on the right side, and 1+ on the left side.   Pulmonary/Chest: She is intubated. No respiratory distress. She has rhonchi. She has rales.           Abdominal: Soft. Bowel sounds are normal. She exhibits no ascites and no mass.   Genitourinary:   Genitourinary Comments: Swain secured, patent, and draining   Musculoskeletal: She exhibits no edema or deformity.   Neurological: She is unresponsive.   No gag  reflex, no corneal reflex. Decorticate posturing   Skin: Skin is dry and intact. Capillary refill takes 2 to 3 seconds. No rash noted. She is not diaphoretic.            Significant Labs: All pertinent labs within the past 24 hours have been reviewed.    Significant Imaging: I have reviewed all pertinent imaging results/findings within the past 24 hours.    Assessment/Plan:      * Hypoxic encephalopathy    - cont hypothermia protocol  -neurology on board             Lactic acidemia    Lactic acid greater than 12.  Secondary to cardiac arrest.  Repeat every 4 hr x2.  Continue IV vancomycin, IV Zosyn empirically.  Follow up on cultures.          Closed fracture of multiple ribs of right side    Sternal fracture, multiple rib fractures as a result of  CPR  S/P right side chest tube  Due to traumatic pneumothorax         Acute hypoxemic respiratory failure    2/2 to cardiopulmonary arrest   Cont ventilation support  . Wean  As tolerated         Cardiac arrest    -Unclear etiology.  CPR done for approximately 30 min per nursing staff.  -Initial rhythm was bradycardic, received atropine and epinephrine with return of spontaneous circulation after two rounds of epi.  -No corneal, no gag reflex.  Note on propofol drip..  -On hypothermia protocol   - Neurology on board               Coronary artery disease involving native coronary artery of native heart without angina pectoris    Cont BB , asa , plavix  and statin   TTE show nl EF  And no WMA           PAD (peripheral artery disease)    Cont  Current  tx         Essential hypertension    Hold bp medication due to  Hypotension             VTE Risk Mitigation (From admission, onward)        Ordered     IP VTE HIGH RISK PATIENT  Once      10/07/18 0922     Place sequential compression device  Until discontinued      10/07/18 0585          Critical care time spent on the evaluation and treatment of severe organ dysfunction, review of pertinent labs and imaging studies,  discussions with consulting providers and discussions with patient/family: 35 minutes.    Plan:  Cont supportive tx  On hypothermia protocol     -Condition:  Critical   -Prognosis: Poor   -Code Status: DNR    -Disposition plan:   Pending acute illness resolve    -Consultant:  Pulmonology , Neurology  And CT surgery   -Nutrition:    -PT/OT:  ----  -Antibiotic :   Zosyn   -Family :  Case was discussed in detail with family .   -DVT Prophylaxis :  Heparin sc   -Out patient Follow up :  -----    -Out Patient  Test :------     -New medication /medication changes:   -----  -Pertinent Lab/Cultures : -----  -Pertinent test:  ----  -Surgery /Procedure : s/p right side chest tube       Jose Eduardo Meek MD  Department of Hospital Medicine   [facility]

## 2018-10-08 NOTE — PROGRESS NOTES
Pharmacist Renal Dose Adjustment Note    Charline Messer is a 83 y.o. female being treated with the medication Zosyn     Patient Data:    Vital Signs (Most Recent):  Temp: (!) 92.1 °F (33.4 °C) (10/08/18 0700)  Pulse: (!) 58 (10/08/18 0721)  Resp: 18 (10/08/18 0721)  BP: (!) 95/49 (10/08/18 0700)  SpO2: 100 % (10/08/18 0721)   Vital Signs (72h Range):  Temp:  [90 °F (32.2 °C)-97.1 °F (36.2 °C)]   Pulse:  []   Resp:  [5-32]   BP: ()/()   SpO2:  [94 %-100 %]      Recent Labs   Lab  10/07/18   2015  10/08/18   0000  10/08/18   0400   CREATININE  0.8  0.8  0.8     Serum creatinine: 0.8 mg/dL 10/08/18 0400  Estimated creatinine clearance: 66.9 mL/min    Medication: Zosyn 4.5 g every 12 hours will be changed to Zosyn 4.5 g every 8 hours, due to CrCl of 66.9 ml/min     Pharmacist's Name: Loree Bush

## 2018-10-08 NOTE — SUBJECTIVE & OBJECTIVE
Review of Systems   Unable to perform ROS: Patient unresponsive       Objective:     Vital Signs (Most Recent):  Temp: (!) 91.6 °F (33.1 °C) (10/08/18 0800)  Pulse: (!) 59 (10/08/18 0736)  Resp: 19 (10/08/18 0736)  BP: (!) 95/49 (10/08/18 0700)  SpO2: 99 % (10/08/18 0736) Vital Signs (24h Range):  Temp:  [90 °F (32.2 °C)-93.2 °F (34 °C)] 91.6 °F (33.1 °C)  Pulse:  [45-73] 59  Resp:  [5-32] 19  SpO2:  [94 %-100 %] 99 %  BP: ()/() 95/49     Weight: 76.8 kg (169 lb 5 oz)  Body mass index is 33.24 kg/m².      Intake/Output Summary (Last 24 hours) at 10/8/2018 0834  Last data filed at 10/8/2018 0700  Gross per 24 hour   Intake 3304.76 ml   Output 1325 ml   Net 1979.76 ml       Physical Exam   Constitutional: She appears well-developed and well-nourished. She has a sickly appearance. No distress. She is sedated and intubated.   HENT:   Head: Normocephalic and atraumatic.   Mouth/Throat: Oropharynx is clear and moist.   Eyes: Conjunctivae and lids are normal. Pupils are equal, round, and reactive to light.   Neck: Trachea normal. Neck supple. No JVD present. Carotid bruit is not present. No tracheal deviation present. No thyroid mass and no thyromegaly present.       Cardiovascular: Regular rhythm and intact distal pulses. Bradycardia present.   Pulses:       Radial pulses are 1+ on the right side, and 1+ on the left side.        Dorsalis pedis pulses are 1+ on the right side, and 1+ on the left side.   Pulmonary/Chest: She is intubated. No respiratory distress. She has rhonchi. She has rales.           Abdominal: Soft. She exhibits no ascites and no mass. Bowel sounds are decreased.   Genitourinary:   Genitourinary Comments: Swain secured, patent, and draining   Musculoskeletal: She exhibits no edema or deformity.   No edema   Lymphadenopathy:     She has no cervical adenopathy.   Neurological: She is unresponsive.   No gag reflex, no corneal reflex. Decorticate posturing.  Min withdrawal of LEs to pain    Skin: Skin is dry and intact. Capillary refill takes 2 to 3 seconds. No rash noted. She is not diaphoretic.            Vents:  Vent Mode: A/C (10/08/18 0736)  Set Rate: 18 bmp (10/08/18 0736)  Vt Set: 0 mL (10/08/18 0736)  Pressure Support: 0 cmH20 (10/08/18 0736)  PEEP/CPAP: 5 cmH20 (10/08/18 0736)  Oxygen Concentration (%): 35 (10/08/18 0736)  Peak Airway Pressure: 28 cmH2O (10/08/18 0736)  Plateau Pressure: 0 cmH20 (10/08/18 0736)  Total Ve: 10.4 mL (10/08/18 0736)  F/VT Ratio<105 (RSBI): (!) 54.29 (10/08/18 0736)    Lines/Drains/Airways     Central Venous Catheter Line                 Percutaneous Central Line Insertion/Assessment - triple lumen  10/07/18 1105 left internal jugular less than 1 day          Drain                 NG/OG Tube 10/07/18 0230 orogastric 16 Fr. Right mouth 1 day         Chest Tube 10/07/18 1238 1 Right Fourth intercostal space 24 Fr. less than 1 day         Urethral Catheter 10/07/18 1200 Temperature probe 16 Fr. less than 1 day          Airway                 Airway - Non-Surgical 10/07/18 0145 Endotracheal Tube 1 day          Arterial Line                 Arterial Line 10/07/18 1100 Left Radial less than 1 day          Peripheral Intravenous Line                 Peripheral IV - Double Lumen 10/07/18 0157 Right Antecubital 1 day                Significant Labs:    CBC/Anemia Profile:  Recent Labs   Lab  10/07/18   0622  10/07/18   1606  10/08/18   0400   WBC  14.72*  17.15*  16.15*   HGB  10.6*  11.5*  10.0*   HCT  33.3*  35.4*  30.8*   PLT  140*  125*  129*   MCV  88  87  86   RDW  14.2  13.9  13.7        Chemistries:  Recent Labs   Lab  10/07/18   0204   10/07/18   2015  10/08/18   0000  10/08/18   0400   NA  137   < >  136  134*  134*   K  4.1   < >  3.6  4.9  4.4   CL  100   < >  108  109  109   CO2  13*   < >  13*  14*  14*   BUN  17   < >  21  20  20   CREATININE  1.6*   < >  0.8  0.8  0.8   CALCIUM  9.0   < >  7.1*  7.1*  7.1*   ALBUMIN  3.5   --    --    --   2.5*   PROT  7.0    --    --    --   5.1*   BILITOT  0.4   --    --    --   0.4   ALKPHOS  144*   --    --    --   109   ALT  59*   --    --    --   58*   AST  105*   --    --    --   69*   MG   --    < >  2.8*  2.4  2.3   PHOS   --    < >  2.8  3.0  2.6*    < > = values in this interval not displayed.       All pertinent labs within the past 24 hours have been reviewed.    Significant Imaging:  I have reviewed and interpreted all pertinent imaging results/findings within the past 24 hours.   CXR with small left PTX

## 2018-10-08 NOTE — PLAN OF CARE
0900 - Per MDR, 12n will start the re-warming process for 12 hr. Then Neurology will f/u.  Dr. Guillen  Will meet with the family to give an update of the plan for today.  1026 -  Dr. Guillen met with spouse, son, niece and nephew and spouse's  and explained the plan of care for today. He the 12 hr warming process and the next steps to follow. The family insisted on asking questions regarding how did she get her ribs cracked, why so many people told them different stories. They appear to be upset  And speaking loudly. Dr Guillen reiterated he could not speak to what transpired from EMS and ED. He can attest to what happened while in ICU.   Since the family wants answers , Dr. Guillen suggested getting someone from patient relations involved. The family has questions that need to be answered. CM called Patient relations and spoke with Ms. Raygoza. She has agreed to meet and speak with the family in the ICU family conference room.   1046 - Idalmis, from patient relations meet with the family as well as CM present. CM noted the family wanted to know about who initiated CPR, why her ribs were cracked, when she left the facility ( [facility]) she was clean and when they were able to see her in the ED she was all bloody, they were told different reports from different people while in ED about her care  So who was telling the truth, did she have a heart attack or not , and why was the tube to her chest removed  And replaced from ED to ICU. Family basically had questions regarding her care from her being found down on her job to the care provided in EDJosé Luis Raygoza she would f/u with the family. Spouse stated he had a  here with him this morning but no one would tell them anything. Ms Raygoza stated she will do the investigation and give them a written report of what was found. She will also info them of any immediate finding as she investigates the case.  The spouse mention he had a  come here and an atty.  Ms.  "Idalmis explained if an atty is involved, the hospital will have to our atty speak with the family atty. Spouse stated " no, he was told to get paperwork first and then they can go back to them.    "

## 2018-10-08 NOTE — PROGRESS NOTES
Pt again hypotensive. Propofol currently at 50mcg/kg/hr needed for vent synchronicity. eICU Dr Verma notified. New order for levophed. Will continue to monitor.

## 2018-10-08 NOTE — SUBJECTIVE & OBJECTIVE
Interval History: Pt was seen and examined at bedside . She is requesting mechanical ventilation  . There is no neurological changes . Pt was started on vasopressor support to keep a map > 65     Review of Systems   Unable to perform ROS: Intubated     Objective:     Vital Signs (Most Recent):  Temp: (!) 91.4 °F (33 °C) (10/08/18 0900)  Pulse: (!) 56 (10/08/18 0926)  Resp: 18 (10/08/18 0926)  BP: (!) 156/56 (10/08/18 0851)  SpO2: 100 % (10/08/18 0926) Vital Signs (24h Range):  Temp:  [90 °F (32.2 °C)-93.2 °F (34 °C)] 91.4 °F (33 °C)  Pulse:  [45-73] 56  Resp:  [5-32] 18  SpO2:  [94 %-100 %] 100 %  BP: ()/(37-76) 156/56     Weight: 76.8 kg (169 lb 5 oz)  Body mass index is 33.24 kg/m².    Intake/Output Summary (Last 24 hours) at 10/8/2018 1003  Last data filed at 10/8/2018 0904  Gross per 24 hour   Intake 3603.09 ml   Output 1210 ml   Net 2393.09 ml      Physical Exam   Constitutional: She appears well-developed and well-nourished. She has a sickly appearance. No distress. She is intubated.   HENT:   Head: Normocephalic and atraumatic.   Mouth/Throat: Oropharynx is clear and moist.   Eyes: Conjunctivae and lids are normal. Pupils are equal, round, and reactive to light.   Neck: Trachea normal. Neck supple. No JVD present. Carotid bruit is not present. No tracheal deviation present. No thyroid mass and no thyromegaly present.       Cardiovascular: Normal rate, regular rhythm and intact distal pulses.   Pulses:       Radial pulses are 1+ on the right side, and 1+ on the left side.        Dorsalis pedis pulses are 1+ on the right side, and 1+ on the left side.   Pulmonary/Chest: She is intubated. No respiratory distress. She has rhonchi. She has rales.           Abdominal: Soft. Bowel sounds are normal. She exhibits no ascites and no mass.   Genitourinary:   Genitourinary Comments: Swain secured, patent, and draining   Musculoskeletal: She exhibits no edema or deformity.   Neurological: She is unresponsive.   No  gag reflex, no corneal reflex. Decorticate posturing   Skin: Skin is dry and intact. Capillary refill takes 2 to 3 seconds. No rash noted. She is not diaphoretic.            Significant Labs: All pertinent labs within the past 24 hours have been reviewed.    Significant Imaging: I have reviewed all pertinent imaging results/findings within the past 24 hours.

## 2018-10-08 NOTE — CONSULTS
[facility] -   Adult Nutrition  Consult Note    SUMMARY     Recommendations    Recommendation/Intervention:   1. If pt remains intubated, recommend initate TF when medically able:       -Peptamen Intense (Bariatric) @ 40 mL/hr. W/ current propofol infusion, provides 1567 kcal, 89 gm protein, 806 mL free H2O.       -Water flushes for tube patency (30 mL q4hrs) and for hydration per MD/NP.       -Check residuals q4hrs, hold TF if >500 cc's  2. If pt extubated and safe for all PO intake, recommend Cardiac diet.   3. Will continue to monitor, adjust recs PRN    Goals: Diet advancement or initiation of nutrition support by RD f/u  Nutrition Goal Status: new  Communication of RD Recs: discussed on rounds, POC review, communicated w/ NP Randy    Reason for Assessment    Reason for Assessment: consult  Dx:  1. Cardiac arrest    2. SOB (shortness of breath)    3. Chest tube in place    4. Encounter for central line placement    5. Hypoxic encephalopathy    6. Acute hypoxemic respiratory failure    7. RYLIE (acute kidney injury)    8. Closed fracture of multiple ribs of right side, initial encounter    9. Coronary artery disease involving native coronary artery of native heart without angina pectoris    10. Essential hypertension    11. Hyperglycemia, unspecified    12. Hypothyroidism, unspecified type    13. Loculated right lateral pneumothorax    14. PAD (peripheral artery disease)    15. Metabolic acidosis      Past Medical History:   Diagnosis Date    Acute hypoxemic respiratory failure 10/7/2018    RYLIE (acute kidney injury) 10/7/2018    Allergic rhinitis, cause unspecified     Arthritis     Benign colonic polyp     Breast cancer mid 1980s    right; per patient s/p right mastectomy and chemo, unsure about radiation    CHF (congestive heart failure)     Coronary artery disease     Coronary artery disease involving native coronary artery of native heart without angina pectoris 8/2/2017    Depression  "    Diverticular disease     External hemorrhoids     Gout attack     Hyperlipidemia     Hypertension     Hypothyroidism     Obesity (BMI 30-39.9) 10/24/2016    Osteoarthritis     Peripheral vascular disease     Postmenopausal     Pulmonary embolism     reported per patient; unsure date    ST elevation myocardial infarction (STEMI) of anterior wall 6/27/2017    Thrombocytopenia     Tinnitus aurium     Vitamin D deficiency disease        Interdisciplinary Rounds: attended  General Information Comments: Pt admitted to ICU, currently intubated and sedated w/ propofol. TTM started in ED, continues.   Nutrition Discharge Planning: pending medical course    Nutrition Risk Screen    Nutrition Risk Screen: no indicators present    Nutrition/Diet History    Do you have any cultural, spiritual, Temple conflicts, given your current situation?: JESSI  Food Allergies: NKFA    Anthropometrics    Temp: (!) 91.8 °F (33.2 °C)  Height: 4' 11.84" (152 cm)  Height (inches): 59.84 in  Weight Method: Bed Scale  Weight: 76.8 kg (169 lb 5 oz)  Weight (lb): 169.31 lb  Ideal Body Weight (IBW), Female: 99.2 lb  % Ideal Body Weight, Female (lb): 170.68 lb  BMI (Calculated): 33.3  BMI Grade: 30 - 34.9- obesity - grade I       Lab/Procedures/Meds    Pertinent Labs Reviewed: reviewed  BMP  Lab Results   Component Value Date     (L) 10/08/2018    K 3.8 10/08/2018     10/08/2018    CO2 15 (L) 10/08/2018    BUN 19 10/08/2018    CREATININE 0.7 10/08/2018    CALCIUM 7.0 (L) 10/08/2018    ANIONGAP 10 10/08/2018    ESTGFRAFRICA >60 10/08/2018    EGFRNONAA >60 10/08/2018     Lab Results   Component Value Date    CALCIUM 7.0 (L) 10/08/2018    PHOS 2.6 (L) 10/08/2018     Lab Results   Component Value Date    ALBUMIN 2.5 (L) 10/08/2018     Recent Labs   Lab  10/08/18   1230   POCTGLUCOSE  92     No results found for: LABA1C, HGBA1C    Pertinent Medications Reviewed: reviewed  Scheduled Meds:   acetaminophen  650 mg Per NG tube " Q6H    aspirin  81 mg Oral Daily    atorvastatin  80 mg Oral Daily    busPIRone  30 mg Per NG tube Once    chlorhexidine  15 mL Mouth/Throat BID    clopidogrel  75 mg Oral Daily    famotidine (PF)  20 mg Intravenous Daily    metoprolol tartrate  12.5 mg Oral BID    piperacillin-tazobactam 4.5 g in dextrose 5 % 100 mL IVPB (ready to mix system)  4.5 g Intravenous Q8H    polyethylene glycol  17 g Oral Daily     Continuous Infusions:   niCARdipine Stopped (10/07/18 1900)    norepinephrine bitartrate-D5W 0.02 mcg/kg/min (10/08/18 1300)    propofol 50 mcg/kg/min (10/08/18 1300)    custom IV infusion builder 125 mL/hr at 10/08/18 1300     PRN Meds:.bisacodyl, dextrose 50%, glucagon (human recombinant), influenza, insulin aspart U-100, magnesium sulfate IVPB, magnesium sulfate IVPB, potassium chloride in water **AND** potassium chloride in water **AND** potassium chloride in water, sodium phosphate IVPB, sodium phosphate IVPB, sodium phosphate IVPB    Physical Findings/Assessment    Overall Physical Appearance: obese, on ventilator support  Tubes: orogastric tube  Oral/Mouth Cavity: tooth/teeth missing  Skin: (Eric=8)    Estimated/Assessed Needs    Weight Used For Calorie Calculations: 76.8 kg (169 lb 5 oz)  Energy Calorie Requirements (kcal): 1289  Energy Need Method: [facility] (modified)  Protein Requirements: 90  Weight Used For Protein Calculations: 45 kg (99 lb 3.2 oz)(Using IBW x 2 - Obese in ICU)     Fluid Need Method: RDA Method(or per MD/NP)  RDA Method (mL): 1289  CHO Requirement: n/a      Nutrition Prescription Ordered    Current Diet Order: NPO    Evaluation of Received Nutrient/Fluid Intake    Other Calories (kcal): 607(Propofol @ 23 mL/hr)  % Intake of Estimated Energy Needs: 25 - 50 %  % Meal Intake: NPO    Nutrition Risk    Level of Risk/Frequency of Follow-up: (f/u x2 weekly)     Assessment and Plan    Nutrition Problem  Inadequate oral intake    Related to (etiology):   On mechanical  ventilation    Signs and Symptoms (as evidenced by):   Current diet order (NPO)    Interventions/Recommendations (treatment strategy):  See above    Nutrition Diagnosis Status:   New         Monitor and Evaluation    Food and Nutrient Intake: energy intake, food and beverage intake  Food and Nutrient Adminstration: diet order  Anthropometric Measurements: weight  Biochemical Data, Medical Tests and Procedures: electrolyte and renal panel, gastrointestinal profile  Nutrition-Focused Physical Findings: overall appearance     Nutrition Follow-Up    RD Follow-up?: Yes

## 2018-10-08 NOTE — CONSULTS
Consult Note  Neurological ICU      Consult Requested By: Jose Eduardo Meek, *  Reason for Consult:  Neurological evaluation for post Cardiac arrest patient     SUBJECTIVE:     History of Present Illness: from the chart.  Ms. Messer is a 83-year-old  female with PMH significant for HTN, CAD, was working at [employer], when she had a witnessed cardiac arrest at around midnight.  Patient was complaining of shortness of breath and collapsed.  Medical staff immediately started CPR, received two rounds of epinephrine, one round of atropine.  She was intubated on the scene and brought to the ED.  GCS upon arrival 3.  No corneal reflex, no gag reflex, not breathing over the vent.  Laboratory workup reveals lactic acid greater than 12, hypotensive with systolic in the 80s, initiated on Levophed drip.  Currently not on sedation except for IV Ativan as needed.  Chest x-ray reveals sternal fracture and multiple anterior rib fractures from the CPR earlier today.  CT head negative for ischemia or hemorrhage.  Patient had a pneumothorax, chest tube placed in the ED. Patient's  and son at the bedside hysterical.  Do not want to discuss any further resuscitative measures.  Patient remains full code at this time.  Patient to be initiated on hypothermia protocol.  She is now warming up , intubated and has propofol drip running.          Review of patient's allergies indicates:   Allergen Reactions    Codeine Nausea And Vomiting    Ibuprofen Hives and Itching           Zetia [ezetimibe] Nausea And Vomiting       Past Medical History:   Diagnosis Date    Acute hypoxemic respiratory failure 10/7/2018    RYLIE (acute kidney injury) 10/7/2018    Allergic rhinitis, cause unspecified     Arthritis     Benign colonic polyp     Breast cancer mid 1980s    right; per patient s/p right mastectomy and chemo, unsure about radiation    CHF (congestive heart failure)     Coronary artery disease      Coronary artery disease involving native coronary artery of native heart without angina pectoris 8/2/2017    Depression     Diverticular disease     External hemorrhoids     Gout attack     Hyperlipidemia     Hypertension     Hypothyroidism     Obesity (BMI 30-39.9) 10/24/2016    Osteoarthritis     Peripheral vascular disease     Postmenopausal     Pulmonary embolism     reported per patient; unsure date    ST elevation myocardial infarction (STEMI) of anterior wall 6/27/2017    Thrombocytopenia     Tinnitus aurium     Vitamin D deficiency disease      Past Surgical History:   Procedure Laterality Date    BREAST SURGERY      CARDIAC CATHETERIZATION      HEART CATH-LEFT Left 6/27/2017    [facility]formed by [facility]    PARTIAL HYSTERECTOMY      Due to fibroids    Right mastectomy       Family History   Problem Relation Age of Onset    Heart disease Mother     Hypertension Mother     Stroke Mother     Hypertension Father     Heart disease Sister     No Known Problems Son     Cancer Neg Hx     Diabetes Neg Hx     Kidney disease Neg Hx      Social History     Tobacco Use    Smoking status: Never Smoker    Smokeless tobacco: Never Used   Substance Use Topics    Alcohol use: Yes     Comment: ocassionally    Drug use: No        Review of Systems:  Review of systems not obtained due to patient factors coma.    OBJECTIVE:     Vital Signs (Most Recent)  Temp: (!) 92.1 °F (33.4 °C) (10/08/18 1500)  Pulse: (!) 56 (10/08/18 1513)  Resp: 18 (10/08/18 1513)  BP: (!) 94/23 (10/08/18 1500)  SpO2: 97 % (10/08/18 1513)    Vital Signs Range (Last 24H):  Temp:  [89.8 °F (32.1 °C)-93 °F (33.9 °C)]   Pulse:  [45-89]   Resp:  [5-29]   BP: ()/()   SpO2:  [93 %-100 %]   Ventilator Data (Last 24H):     Vent Mode: A/C  Oxygen Concentration (%):  [35] 35  Resp Rate Total:  [18 br/min-34 br/min] 18 br/min  Vt Set:  [0 mL] 0 mL  PEEP/CPAP:  [5 cmH20] 5 cmH20  Pressure Support:  [0  cmH20] 0 cmH20  Mean Airway Pressure:  [9.6 sxS91-66 cmH20] 9.7 cmH20    Hemodynamic Parameters (Last 24H):       Physical Exam:  No exam performed today, is warming up and unconscious. .    Lines/Drains:       Percutaneous Central Line Insertion/Assessment - triple lumen  10/07/18 1105 left internal jugular (Active)   Dressing biopatch in place;dressing dry and intact 10/8/2018 11:05 AM   Securement secured w/ sutures;catheter securement device utilized 10/8/2018 11:05 AM   Additional Site Signs no drainage;no streak formation;no palpable cord;no edema;no warmth;no erythema;no pain 10/8/2018 11:05 AM   Distal Patency/Care blood return present;flushed w/o difficulty;infusing 10/8/2018 11:05 AM   Medial Patency/Care flushed w/o difficulty;blood return present;infusing 10/8/2018 11:05 AM   Proximal Patency/Care flushed w/o difficulty;blood return present;normal saline locked 10/8/2018 11:05 AM   Dressing Change Due 10/14/18 10/8/2018  3:05 AM   Daily Line Review Performed 10/8/2018  3:05 AM   Number of days: 1            Peripheral IV - Double Lumen 10/07/18 0157 Right Antecubital (Active)   Site Assessment Clean;Dry;Intact 10/8/2018 11:05 AM   Line 1 Status Flushed;Saline locked 10/8/2018 11:05 AM   Line 2 Status Flushed;Saline locked 10/8/2018 11:05 AM   Dressing Status Clean;Dry;Intact 10/8/2018 11:05 AM   Dressing Intervention Dressing reinforced 10/8/2018 11:05 AM   Dressing Change Due 10/10/18 10/7/2018  3:05 PM   Site Change Due 10/10/18 10/7/2018  9:30 AM   Reason Not Rotated Not due 10/8/2018 11:05 AM   Number of days: 1            Arterial Line 10/07/18 1100 Left Radial (Active)   Site Assessment Clean;Intact;Dry;No redness;No swelling 10/8/2018  7:05 AM   Line Status Pulsatile blood flow 10/8/2018 11:05 AM   Art Line Waveform Appropriate;Square wave test performed 10/8/2018 11:05 AM   Arterial Line Interventions Zeroed and calibrated;Leveled;Connections checked and tightened;Flushed per protocol 10/8/2018  11:05 AM   Color/Movement/Sensation Capillary refill less than 3 sec;Cool fingers/toes 10/8/2018 11:05 AM   Dressing Type Transparent 10/8/2018 11:05 AM   Dressing Status Biopatch in place;Clean;Dry;Intact 10/8/2018 11:05 AM   Dressing Intervention New dressing 10/8/2018  7:05 AM   Dressing Change Due 10/14/18 10/8/2018  3:05 AM   Number of days: 1            Chest Tube 10/07/18 1238 1 Right Fourth intercostal space 24 Fr. (Active)   Chest Tube WDL WDL 10/8/2018  7:05 AM   Function -20 cm H2O 10/8/2018 11:05 AM   Air Leak/Fluctuation air leak not present;connections tightened;dependent drainage cleared 10/8/2018 11:05 AM   Safety all tubing connections taped;all connections secured;suction checked 10/8/2018 11:05 AM   Securement tubing anchored to body distal to insertion site w/ tape 10/8/2018 11:05 AM   Patency Intervention Tip/tilt;Milked 10/8/2018 11:05 AM   Drainage Description Sanguineous 10/8/2018 11:05 AM   Dressing Appearance saturated 10/8/2018 11:05 AM   Dressing Care dressing changed 10/8/2018 11:05 AM   Left Subcutaneous Emphysema none present 10/8/2018 11:05 AM   Right Subcutaneous Emphysema none present 10/8/2018 11:05 AM   Site Assessment Clean;Dry;Intact 10/8/2018 11:05 AM   Surrounding Skin Intact;Dry 10/8/2018 11:05 AM   Output (mL) 5 mL 10/8/2018 11:00 AM   Number of days: 1            NG/OG Tube 10/07/18 0230 orogastric 16 Fr. Right mouth (Active)   Placement Check placement verified by x-ray 10/8/2018 11:05 AM   pH Aspirate Result 4 10/7/2018  7:05 PM   Tube advanced (cm) 53 10/7/2018  9:30 AM   Tolerance no signs/symptoms of discomfort 10/8/2018 11:05 AM   Securement taped to commercial device 10/8/2018 11:05 AM   Clamp Status/Tolerance clamped;no restlessness;no emesis;no abdominal distention 10/8/2018  3:05 AM   Suction Setting/Drainage Method low;intermittent setting;suction at the bedside 10/7/2018  3:05 PM   Insertion Site Appearance no redness, warmth, tenderness, skin breakdown,  "drainage 10/8/2018 11:05 AM   Drainage Brown;Thin 10/8/2018 11:05 AM   Flush/Irrigation flushed w/;water;no resistance met 10/8/2018 11:05 AM   Intake (mL) 100 mL 10/8/2018 12:00 AM   Tube Output(mL)(Include Discarded Residual) 25 mL 10/7/2018  8:00 AM   Residual Amount (ml) 100 ml 10/7/2018  7:05 PM   Number of days: 1            Urethral Catheter 10/07/18 1200 Temperature probe 16 Fr. (Active)   Site Assessment Clean;Intact;Dry 10/8/2018 11:05 AM   Collection Container Other (Comment) 10/8/2018 11:05 AM   Securement Method secured to top of thigh w/ adhesive device 10/8/2018 11:05 AM   Catheter Care Performed yes 10/8/2018 11:05 AM   Reason for Continuing Urinary Catheterization Critically ill in ICU requiring intensive monitoring 10/8/2018 11:05 AM   CAUTI Prevention Bundle StatLock in place w 1" slack;Intact seal between catheter & drainage tubing;Drainage bag off the floor;Drainage bag below bladder;No dependent loops or kinks;Drainage bag not overfilled (<2/3 full) 10/8/2018  7:05 AM   Output (mL) 19 mL 10/8/2018  3:00 PM   Number of days: 1       Laboratory:  CBC:   Recent Labs   Lab  10/08/18   0400   WBC  16.15*   RBC  3.60*   HGB  10.0*   HCT  30.8*   PLT  129*   MCV  86   MCH  27.8   MCHC  32.5     CMP:   Recent Labs   Lab  10/08/18   0400   10/08/18   1147   GLU  140*   < >  102   CALCIUM  7.1*   < >  7.0*   ALBUMIN  2.5*   --    --    PROT  5.1*   --    --    NA  134*   < >  134*   K  4.4   < >  3.8   CO2  14*   < >  15*   CL  109   < >  109   BUN  20   < >  19   CREATININE  0.8   < >  0.7   ALKPHOS  109   --    --    ALT  58*   --    --    AST  69*   --    --    BILITOT  0.4   --    --     < > = values in this interval not displayed.     LFTs:   Recent Labs   Lab  10/08/18   0400   ALT  58*   AST  69*   ALKPHOS  109   BILITOT  0.4   PROT  5.1*   ALBUMIN  2.5*     Coagulation:   Recent Labs   Lab  10/08/18   0400   LABPROT  11.5   INR  1.1   APTT  27.3     ABGs:   Recent Labs   Lab  10/08/18   1341   PH "  7.382   PCO2  29.4*   PO2  124*   HCO3  17.5*   POCSATURATED  99   BE  -8         Diagnostic Results:  CT: no hemorhage     ASSESSMENT/PLAN:     Post cardiac arrest on resuscitation process . Unable to do neuro assessment now but will do tomorrow.  Will do EEG for AM.    Plan:  Will follow.    Critical Care Time greater than: 30 Minutes

## 2018-10-08 NOTE — ASSESSMENT & PLAN NOTE
Likely anoxic injury  TTM continues  Consult Neuro post TTM  ICU neuro monitoring  Supportive care  Propofol for TTM and possible underlying seizures  Posturing continues. Some neurological improvement -withdraws from painful stimuli and pupils reactive  Check EEG if posturing persist on Propofol infusion  CT head unremarkable in ED for acute process

## 2018-10-08 NOTE — EICU
Notified of ABG result and need to turn off propofol due to relative hypotension    82 y/o F history of CAD, HTN was apparently well when she started complaining of shortness of breath and collapsed as witnessed by co-workers at [facility].  Initially bradycardic and eventually lost pulse twice.  ROSC and intubated on scene.  Chest tube placed in ED for a right sided pneumothorax likely from chest compressions as with anterior right rib fractures noted on CT.  Initial chest tube replaced by CT surgery as side port was in the soft tissue.    Camera assessment:  Still appears sedated off propofol  Pressure control 23, TV 400s, PEEP 5 FiO2 35%, peak pressure 23    ABG 7.39/27/157  Not breathing significantly over the set rate    · Decrease rate to 18  · Re-ordered propofol as titratable      BP decreased to 76/47  · Ordered to start levophed to maintain SBP >65 and titrate down propofol as tolerated

## 2018-10-08 NOTE — ASSESSMENT & PLAN NOTE
Sternal fracture, multiple rib fractures as a result of  CPR  S/P right side chest tube  Due to traumatic pneumothorax

## 2018-10-08 NOTE — NURSING
Neuro reassessment done after propofol decreased. Patient now withdrawing to pain on Bilateral LE, Decorticate posturing to Bilateral UE. Pupils are 3mm brisk round, equal and reactive. Patient is breathing over vent rate. Patient still has no cough, gag or corneal reflexes present.

## 2018-10-08 NOTE — PROGRESS NOTES
Spoke with spouse and son  Updated  Still emotional with questions about pre hospital events  Ref to patient relations    Continues TTM    Jose Guillen MD

## 2018-10-08 NOTE — ASSESSMENT & PLAN NOTE
Continue home Plavix, statin and ASA  Continue low dose Lopressor as BP tolerates  ICU cardiac monitoring  Echo EF 55%

## 2018-10-08 NOTE — PLAN OF CARE
Problem: Patient Care Overview  Goal: Plan of Care Review  Outcome: Ongoing (interventions implemented as appropriate)  Pt remains nonresponse. Pt maintained at goal temp 33C on TTM. BPs very labile. Cardene gtt stopped at beginning of shift and Levo gtt added this AM. Propofol gtt now titrating; pt in sync w/vent, no shivering noted. IVF and IV abx per orders. R chest tube patent, 32mL of serosanguinous drainage this shift. CBGs WNL. SB on monitor. Good UOP per criticore torres.  Q2 turns w/wedge, no skin breakdown noted. POC discussed w/pt's spouse and sister, verbalized understanding.

## 2018-10-08 NOTE — PLAN OF CARE
Problem: Patient Care Overview  Goal: Plan of Care Review  Outcome: Ongoing (interventions implemented as appropriate)  Patient remained stable throughout the shift. TTM rewarming began at 1250. Patient remains intubated, sedated and on pressors for blood pressure management. Family had a chance to speak with NP and MD today at bedside.

## 2018-10-08 NOTE — ASSESSMENT & PLAN NOTE
Continue to control with Propofol and Cardene infusion  BP remains labile at times needed Levophed infusion  Arterial line hemodynamic monitoring

## 2018-10-08 NOTE — PROGRESS NOTES
[facility]  Critical Care Medicine  Progress Note    Patient Name: Charline Messer  MRN: [id number]  Admission Date: 10/7/2018  Hospital Length of Stay: 1 days  Code Status: DNR  Attending Provider: Jose Eduardo Meek, *  Primary Care Provider: Primary Doctor No   Principal Problem: Hypoxic encephalopathy    Subjective:     HPI:  Ms Messer is a 82 yo BF with a PMH of CAD, CHF, HTN, HLD, Hypothyroidism, PAD, OA and Right Breast CA.  Per EMS, patient was working at [employer] last night when coworkers noticed patient was having difficulty breathing and then collapsed. EMS reports when they arrived patient was bradycardic in 40s and then shortly after pulse was lost. EMS and FD initiated CPR and administered epi and regained pulse. EMS reports shortly after, pulse was lost and another dose of epi was administered and regained pulse. Patient was intubated on scene with 7.0 ET tube.  She was presented to [facility] ED about 0230 hr this AM w/ GCS 3, LA > 12, SBP in 80s and started on Levophed infusion once CL obtained.  CT head unremarkable and CT chest with multiple right rib fractures.  She had right tension PTX and chest tube placed.          Hospital/ICU Course:  10/7 - Admitted to ICU this AM unresponsive on no sedation with some decorticate posturing.  SBP 180s off Levophed infusion.  Intubated on mech ventilation.  External cooling TTM started in ED.    10/8 -Remains unresponsive with decorticate posturing on no sedation. Some neurological improvement. Withdraws legs from painful stimuli. Pupils are reactive. Remains intubated on mech vent. BP is labile requiring alternating levophed and cardene.TTM continues. Good urine output.     Review of Systems   Unable to perform ROS: Patient unresponsive       Objective:     Vital Signs (Most Recent):  Temp: (!) 91.6 °F (33.1 °C) (10/08/18 0800)  Pulse: (!) 59 (10/08/18 0736)  Resp: 19 (10/08/18 0736)  BP: (!) 95/49 (10/08/18 0700)  SpO2: 99  % (10/08/18 0736) Vital Signs (24h Range):  Temp:  [90 °F (32.2 °C)-93.2 °F (34 °C)] 91.6 °F (33.1 °C)  Pulse:  [45-73] 59  Resp:  [5-32] 19  SpO2:  [94 %-100 %] 99 %  BP: ()/() 95/49     Weight: 76.8 kg (169 lb 5 oz)  Body mass index is 33.24 kg/m².      Intake/Output Summary (Last 24 hours) at 10/8/2018 0834  Last data filed at 10/8/2018 0700  Gross per 24 hour   Intake 3304.76 ml   Output 1325 ml   Net 1979.76 ml       Physical Exam   Constitutional: She appears well-developed and well-nourished. She has a sickly appearance. No distress. She is sedated and intubated.   HENT:   Head: Normocephalic and atraumatic.   Mouth/Throat: Oropharynx is clear and moist.   Eyes: Conjunctivae and lids are normal. Pupils are equal, round, and reactive to light.   Neck: Trachea normal. Neck supple. No JVD present. Carotid bruit is not present. No tracheal deviation present. No thyroid mass and no thyromegaly present.       Cardiovascular: Regular rhythm and intact distal pulses. Bradycardia present.   Pulses:       Radial pulses are 1+ on the right side, and 1+ on the left side.        Dorsalis pedis pulses are 1+ on the right side, and 1+ on the left side.   Pulmonary/Chest: She is intubated. No respiratory distress. She has rhonchi. She has rales.           Abdominal: Soft. She exhibits no ascites and no mass. Bowel sounds are decreased.   Genitourinary:   Genitourinary Comments: Swain secured, patent, and draining   Musculoskeletal: She exhibits no edema or deformity.   No edema   Lymphadenopathy:     She has no cervical adenopathy.   Neurological: She is unresponsive.   No gag reflex, no corneal reflex. Decorticate posturing.  Min withdrawal of LEs to pain   Skin: Skin is dry and intact. Capillary refill takes 2 to 3 seconds. No rash noted. She is not diaphoretic.            Vents:  Vent Mode: A/C (10/08/18 0736)  Set Rate: 18 bmp (10/08/18 0736)  Vt Set: 0 mL (10/08/18 0736)  Pressure Support: 0 cmH20 (10/08/18  0736)  PEEP/CPAP: 5 cmH20 (10/08/18 0736)  Oxygen Concentration (%): 35 (10/08/18 0736)  Peak Airway Pressure: 28 cmH2O (10/08/18 0736)  Plateau Pressure: 0 cmH20 (10/08/18 0736)  Total Ve: 10.4 mL (10/08/18 0736)  F/VT Ratio<105 (RSBI): (!) 54.29 (10/08/18 0736)    Lines/Drains/Airways     Central Venous Catheter Line                 Percutaneous Central Line Insertion/Assessment - triple lumen  10/07/18 1105 left internal jugular less than 1 day          Drain                 NG/OG Tube 10/07/18 0230 orogastric 16 Fr. Right mouth 1 day         Chest Tube 10/07/18 1238 1 Right Fourth intercostal space 24 Fr. less than 1 day         Urethral Catheter 10/07/18 1200 Temperature probe 16 Fr. less than 1 day          Airway                 Airway - Non-Surgical 10/07/18 0145 Endotracheal Tube 1 day          Arterial Line                 Arterial Line 10/07/18 1100 Left Radial less than 1 day          Peripheral Intravenous Line                 Peripheral IV - Double Lumen 10/07/18 0157 Right Antecubital 1 day                Significant Labs:    CBC/Anemia Profile:  Recent Labs   Lab  10/07/18   0622  10/07/18   1606  10/08/18   0400   WBC  14.72*  17.15*  16.15*   HGB  10.6*  11.5*  10.0*   HCT  33.3*  35.4*  30.8*   PLT  140*  125*  129*   MCV  88  87  86   RDW  14.2  13.9  13.7        Chemistries:  Recent Labs   Lab  10/07/18   0204   10/07/18   2015  10/08/18   0000  10/08/18   0400   NA  137   < >  136  134*  134*   K  4.1   < >  3.6  4.9  4.4   CL  100   < >  108  109  109   CO2  13*   < >  13*  14*  14*   BUN  17   < >  21  20  20   CREATININE  1.6*   < >  0.8  0.8  0.8   CALCIUM  9.0   < >  7.1*  7.1*  7.1*   ALBUMIN  3.5   --    --    --   2.5*   PROT  7.0   --    --    --   5.1*   BILITOT  0.4   --    --    --   0.4   ALKPHOS  144*   --    --    --   109   ALT  59*   --    --    --   58*   AST  105*   --    --    --   69*   MG   --    < >  2.8*  2.4  2.3   PHOS   --    < >  2.8  3.0  2.6*    < > = values in  this interval not displayed.       All pertinent labs within the past 24 hours have been reviewed.    Significant Imaging:  I have reviewed and interpreted all pertinent imaging results/findings within the past 24 hours.   CXR with small right PTX    Assessment/Plan:     Neuro   * Hypoxic encephalopathy    Likely anoxic injury  TTM continues  Consult Neuro post TTM  ICU neuro monitoring  Supportive care  Propofol for TTM and possible underlying seizures  Posturing continues. Some neurological improvement -withdraws from painful stimuli and pupils reactive  Check EEG if posturing persist on Propofol infusion  CT head unremarkable in ED for acute process        Pulmonary   Acute hypoxemic respiratory failure    Cont full vent support  Vent settings reviewed and adjusted multiple times  VAP prophylaxis  SAT/SBT post TTM and neuro improvement        Cardiac/Vascular   Coronary artery disease involving native coronary artery of native heart without angina pectoris    Continue home Plavix, statin and ASA  Continue low dose Lopressor as BP tolerates  ICU cardiac monitoring  Echo EF 55%        PAD (peripheral artery disease)    Continue home Plavix and ASA        Essential hypertension    Continue to control with Propofol and Cardene infusion  BP remains labile at times needed Levophed infusion  Arterial line hemodynamic monitoring        Cardiac arrest    ICU cardiac monitoring  Echo preserved systolic function with EF 55%  Cont supportive care        Renal/   Metabolic acidosis    Cont IVFs and support BP  Follow up BMPs  Good urine output  Start bicarb infusion          Endocrine   Hyperglycemia, unspecified    Good glucose control  Continue SSI        Orthopedic   Closed fracture of multiple ribs of right side    Right chest tube for PTX remains secured, patent, and draining.        Other   Loculated right lateral pneumothorax    Chest tube secured and changed from water seal to -20 suction this AM due to persistent  PTX on AM CXR  Blood cultures no growth  Sputum cultures gram + cocci and gram - rods  Continue zosyn  Follow fever curve and repeat CBC in AM          Preventive Measures and Monitoring:   Stress Ulcer: Pepcid  Nutrition: NPO  Glucose control: SSI  Bowel prophylaxis: Miralax  DVT prophylaxis: SCDs  Hx CAD on B-Blocker: Lopressor  Head of Bed/Reposition: Elevate HOB and turn Q1-2 hours   Early Mobility: bed rest  SAT/SBT: post TTM  Vent Day: #2  OG Day: #2  Central Line Left IJ Day: #2  Right chest tube Day: #2  Left Radial Arterial Line Day: #2  Swain Day: #2  IVAB Day: #2  Code Status: DNR  Pneumonia Vaccine: UTD  Flu Vaccine: ordered     Counseling/Consultation:I have discussed the care of this patient in detail with the bedside nursing staff and Dr. Guillen and Dr. Redd    Critical Care Time: 61 minutes  Critical secondary to Patient has a condition that poses threat to life and bodily function: Resp Failure post Cardiac Arrest  Patient has an abrupt change in neurologic status: Anoxic Encephalopathy  Patient is currently on drug therapy requiring intensive monitoring for toxicity: Levophed infusion  Patient is currently receiving parenteral controlled substances: Propofol infusion      Critical care was time spent personally by me on the following activities: development of treatment plan with patient or surrogate and bedside caregivers, discussions with consultants, evaluation of patient's response to treatment, examination of patient, ordering and performing treatments and interventions, ordering and review of laboratory studies, ordering and review of radiographic studies, pulse oximetry, re-evaluation of patient's condition. This critical care time did not overlap with that of any other provider or involve time for any procedures.     Wes Padilla NP  Critical Care Medicine  [facility]

## 2018-10-08 NOTE — PLAN OF CARE
Problem: Patient Care Overview  Goal: Plan of Care Review  Outcome: Ongoing (interventions implemented as appropriate)  Patient admitted from ER at 930. Central line, chest tube replaced at bedside as well as art line insertion and torres exchange. Family updated by NP and MD and aware if patients condition. Arctic sun hypothermia protocol initiated and maintained. Propofol for vent synchrony, cardene added for BP management, patient turned q2 hours. MAGY notified.

## 2018-10-08 NOTE — PLAN OF CARE
Problem: Patient Care Overview  Goal: Plan of Care Review  Outcome: Ongoing (interventions implemented as appropriate)  Recommendations    Recommendation/Intervention:   1. If pt remains intubated, recommend initate TF when medically able:       -Peptamen Intense (Bariatric) @ 40 mL/hr. W/ current propofol infusion, provides 1567 kcal, 89 gm protein, 806 mL free H2O.       -Water flushes for tube patency (30 mL q4hrs) and for hydration per MD/NP.       -Check residuals q4hrs, hold TF if >500 cc's  2. If pt extubated and safe for all PO intake, recommend Cardiac diet.   3. Will continue to monitor, adjust recs PRN    Goals: Diet advancement or initiation of nutrition support by RD f/u  Nutrition Goal Status: new  Communication of RD Recs: discussed on rounds, POC review, communicated w/ NP Randy

## 2018-10-08 NOTE — PROGRESS NOTES
Pt hypotensive. Has been off Cardene since 1900. Propofol turned off and BP improved. No shivering or movement noted in pt at this time. Pt not breathing over venty eICU Dr Verma notified. Per Dr Verma she will change to a titrating Propofol order.

## 2018-10-08 NOTE — ASSESSMENT & PLAN NOTE
Chest tube secured and changed from water seal to -20 suction this AM due to persistent PTX on AM CXR  Blood cultures no growth  Sputum cultures gram + cocci and gram - rods  Continue zosyn  Follow fever curve and repeat CBC in AM

## 2018-10-08 NOTE — ASSESSMENT & PLAN NOTE
-Unclear etiology.  CPR done for approximately 30 min per nursing staff.  -Initial rhythm was bradycardic, received atropine and epinephrine with return of spontaneous circulation after two rounds of epi.  -No corneal, no gag reflex.  Note on propofol drip..  -On hypothermia protocol   - Neurology on board

## 2018-10-09 PROBLEM — D64.9 ANEMIA, UNSPECIFIED: Status: ACTIVE | Noted: 2018-10-09

## 2018-10-09 PROBLEM — R56.9: Status: ACTIVE | Noted: 2018-10-09

## 2018-10-09 LAB
ALBUMIN SERPL BCP-MCNC: 2.3 G/DL
ALLENS TEST: ABNORMAL
ALLENS TEST: ABNORMAL
ALP SERPL-CCNC: 82 U/L
ALT SERPL W/O P-5'-P-CCNC: 41 U/L
ANION GAP SERPL CALC-SCNC: 10 MMOL/L
ANION GAP SERPL CALC-SCNC: 11 MMOL/L
ANION GAP SERPL CALC-SCNC: 9 MMOL/L
APTT BLDCRRT: 28.2 SEC
AST SERPL-CCNC: 78 U/L
BACTERIA SPEC AEROBE CULT: NORMAL
BASOPHILS # BLD AUTO: 0.01 K/UL
BASOPHILS # BLD AUTO: 0.02 K/UL
BASOPHILS NFR BLD: 0.1 %
BASOPHILS NFR BLD: 0.2 %
BILIRUB SERPL-MCNC: 0.4 MG/DL
BUN SERPL-MCNC: 17 MG/DL
BUN SERPL-MCNC: 18 MG/DL
BUN SERPL-MCNC: 18 MG/DL
CALCIUM SERPL-MCNC: 6.8 MG/DL
CALCIUM SERPL-MCNC: 6.9 MG/DL
CALCIUM SERPL-MCNC: 7 MG/DL
CHLORIDE SERPL-SCNC: 107 MMOL/L
CO2 SERPL-SCNC: 17 MMOL/L
CO2 SERPL-SCNC: 18 MMOL/L
CO2 SERPL-SCNC: 18 MMOL/L
CO2 SERPL-SCNC: 19 MMOL/L
CO2 SERPL-SCNC: 20 MMOL/L
CREAT SERPL-MCNC: 0.8 MG/DL
CREAT SERPL-MCNC: 0.8 MG/DL
CREAT SERPL-MCNC: 0.9 MG/DL
CREAT SERPL-MCNC: 0.9 MG/DL
CREAT SERPL-MCNC: 1 MG/DL
DELSYS: ABNORMAL
DELSYS: ABNORMAL
DIFFERENTIAL METHOD: ABNORMAL
DIFFERENTIAL METHOD: ABNORMAL
EOSINOPHIL # BLD AUTO: 0.4 K/UL
EOSINOPHIL # BLD AUTO: 0.4 K/UL
EOSINOPHIL NFR BLD: 3 %
EOSINOPHIL NFR BLD: 3.5 %
ERYTHROCYTE [DISTWIDTH] IN BLOOD BY AUTOMATED COUNT: 14.4 %
ERYTHROCYTE [DISTWIDTH] IN BLOOD BY AUTOMATED COUNT: 14.9 %
ERYTHROCYTE [SEDIMENTATION RATE] IN BLOOD BY WESTERGREN METHOD: 18 MM/H
EST. GFR  (AFRICAN AMERICAN): >60 ML/MIN/1.73 M^2
EST. GFR  (NON AFRICAN AMERICAN): 53 ML/MIN/1.73 M^2
EST. GFR  (NON AFRICAN AMERICAN): >60 ML/MIN/1.73 M^2
FIO2: 35
GLUCOSE SERPL-MCNC: 86 MG/DL
GLUCOSE SERPL-MCNC: 89 MG/DL
GLUCOSE SERPL-MCNC: 94 MG/DL
GLUCOSE SERPL-MCNC: 94 MG/DL
GLUCOSE SERPL-MCNC: 96 MG/DL
GRAM STN SPEC: NORMAL
HCO3 UR-SCNC: 18.5 MMOL/L (ref 24–28)
HCO3 UR-SCNC: 20.4 MMOL/L (ref 24–28)
HCT VFR BLD AUTO: 25.7 %
HCT VFR BLD AUTO: 26.1 %
HGB BLD-MCNC: 8.2 G/DL
HGB BLD-MCNC: 8.6 G/DL
INR PPP: 1.1
IP: 23
IT: 0.71
LYMPHOCYTES # BLD AUTO: 0.7 K/UL
LYMPHOCYTES # BLD AUTO: 0.9 K/UL
LYMPHOCYTES NFR BLD: 5.1 %
LYMPHOCYTES NFR BLD: 6.8 %
MAGNESIUM SERPL-MCNC: 1.8 MG/DL
MAGNESIUM SERPL-MCNC: 1.9 MG/DL
MAGNESIUM SERPL-MCNC: 2 MG/DL
MAGNESIUM SERPL-MCNC: 2.6 MG/DL
MCH RBC QN AUTO: 27.4 PG
MCH RBC QN AUTO: 28 PG
MCHC RBC AUTO-ENTMCNC: 31.9 G/DL
MCHC RBC AUTO-ENTMCNC: 33 G/DL
MCV RBC AUTO: 85 FL
MCV RBC AUTO: 86 FL
MODE: ABNORMAL
MONOCYTES # BLD AUTO: 0.4 K/UL
MONOCYTES # BLD AUTO: 0.5 K/UL
MONOCYTES NFR BLD: 3 %
MONOCYTES NFR BLD: 3.7 %
NEUTROPHILS # BLD AUTO: 10.8 K/UL
NEUTROPHILS # BLD AUTO: 11.6 K/UL
NEUTROPHILS NFR BLD: 86.3 %
NEUTROPHILS NFR BLD: 88.9 %
PCO2 BLDA: 27.8 MMHG (ref 35–45)
PCO2 BLDA: 28.5 MMHG (ref 35–45)
PEEP: 5
PH SMN: 7.42 [PH] (ref 7.35–7.45)
PH SMN: 7.47 [PH] (ref 7.35–7.45)
PHOSPHATE SERPL-MCNC: 3 MG/DL
PHOSPHATE SERPL-MCNC: 3 MG/DL
PHOSPHATE SERPL-MCNC: 3.1 MG/DL
PHOSPHATE SERPL-MCNC: 3.2 MG/DL
PLATELET # BLD AUTO: 104 K/UL
PLATELET # BLD AUTO: 93 K/UL
PLATELET BLD QL SMEAR: ABNORMAL
PMV BLD AUTO: 12.4 FL
PMV BLD AUTO: ABNORMAL FL
PO2 BLDA: 132 MMHG (ref 80–100)
PO2 BLDA: 144 MMHG (ref 80–100)
POC BE: -3 MMOL/L
POC BE: -6 MMOL/L
POC SATURATED O2: 99 % (ref 95–100)
POC SATURATED O2: 99 % (ref 95–100)
POCT GLUCOSE: 61 MG/DL (ref 70–110)
POCT GLUCOSE: 79 MG/DL (ref 70–110)
POCT GLUCOSE: 79 MG/DL (ref 70–110)
POCT GLUCOSE: 80 MG/DL (ref 70–110)
POCT GLUCOSE: 81 MG/DL (ref 70–110)
POCT GLUCOSE: 83 MG/DL (ref 70–110)
POCT GLUCOSE: 87 MG/DL (ref 70–110)
POCT GLUCOSE: 98 MG/DL (ref 70–110)
POTASSIUM SERPL-SCNC: 3.9 MMOL/L
POTASSIUM SERPL-SCNC: 4.1 MMOL/L
POTASSIUM SERPL-SCNC: 4.3 MMOL/L
POTASSIUM SERPL-SCNC: 4.3 MMOL/L
POTASSIUM SERPL-SCNC: 4.7 MMOL/L
PROT SERPL-MCNC: 5 G/DL
PROTHROMBIN TIME: 11.5 SEC
RBC # BLD AUTO: 2.99 M/UL
RBC # BLD AUTO: 3.07 M/UL
SAMPLE: ABNORMAL
SAMPLE: ABNORMAL
SITE: ABNORMAL
SITE: ABNORMAL
SODIUM SERPL-SCNC: 134 MMOL/L
SODIUM SERPL-SCNC: 135 MMOL/L
SODIUM SERPL-SCNC: 135 MMOL/L
SODIUM SERPL-SCNC: 136 MMOL/L
SODIUM SERPL-SCNC: 137 MMOL/L
WBC # BLD AUTO: 12.55 K/UL
WBC # BLD AUTO: 13.12 K/UL

## 2018-10-09 PROCEDURE — S0028 INJECTION, FAMOTIDINE, 20 MG: HCPCS | Performed by: NURSE PRACTITIONER

## 2018-10-09 PROCEDURE — 25000003 PHARM REV CODE 250: Performed by: FAMILY MEDICINE

## 2018-10-09 PROCEDURE — 83520 IMMUNOASSAY QUANT NOS NONAB: CPT

## 2018-10-09 PROCEDURE — 84100 ASSAY OF PHOSPHORUS: CPT | Mod: 91

## 2018-10-09 PROCEDURE — 85730 THROMBOPLASTIN TIME PARTIAL: CPT

## 2018-10-09 PROCEDURE — 99233 SBSQ HOSP IP/OBS HIGH 50: CPT | Mod: ,,, | Performed by: PSYCHIATRY & NEUROLOGY

## 2018-10-09 PROCEDURE — 63600175 PHARM REV CODE 636 W HCPCS: Performed by: NURSE PRACTITIONER

## 2018-10-09 PROCEDURE — 99900035 HC TECH TIME PER 15 MIN (STAT)

## 2018-10-09 PROCEDURE — 83735 ASSAY OF MAGNESIUM: CPT

## 2018-10-09 PROCEDURE — 20000000 HC ICU ROOM

## 2018-10-09 PROCEDURE — 37799 UNLISTED PX VASCULAR SURGERY: CPT

## 2018-10-09 PROCEDURE — 95822 EEG COMA OR SLEEP ONLY: CPT | Mod: 26,,, | Performed by: PSYCHIATRY & NEUROLOGY

## 2018-10-09 PROCEDURE — 85610 PROTHROMBIN TIME: CPT

## 2018-10-09 PROCEDURE — 95816 EEG AWAKE AND DROWSY: CPT

## 2018-10-09 PROCEDURE — 84100 ASSAY OF PHOSPHORUS: CPT

## 2018-10-09 PROCEDURE — 80048 BASIC METABOLIC PNL TOTAL CA: CPT | Mod: 91

## 2018-10-09 PROCEDURE — 82803 BLOOD GASES ANY COMBINATION: CPT

## 2018-10-09 PROCEDURE — 85025 COMPLETE CBC W/AUTO DIFF WBC: CPT

## 2018-10-09 PROCEDURE — 25000003 PHARM REV CODE 250: Performed by: INTERNAL MEDICINE

## 2018-10-09 PROCEDURE — 80053 COMPREHEN METABOLIC PANEL: CPT

## 2018-10-09 PROCEDURE — 63600175 PHARM REV CODE 636 W HCPCS: Performed by: INTERNAL MEDICINE

## 2018-10-09 PROCEDURE — 99291 CRITICAL CARE FIRST HOUR: CPT | Mod: ,,, | Performed by: NURSE PRACTITIONER

## 2018-10-09 PROCEDURE — 83735 ASSAY OF MAGNESIUM: CPT | Mod: 91

## 2018-10-09 PROCEDURE — 80048 BASIC METABOLIC PNL TOTAL CA: CPT

## 2018-10-09 PROCEDURE — 94003 VENT MGMT INPAT SUBQ DAY: CPT

## 2018-10-09 PROCEDURE — 25000003 PHARM REV CODE 250: Performed by: NURSE PRACTITIONER

## 2018-10-09 RX ORDER — METOPROLOL TARTRATE 25 MG/1
6.25 TABLET, FILM COATED ORAL 2 TIMES DAILY
Status: DISCONTINUED | OUTPATIENT
Start: 2018-10-10 | End: 2018-10-10

## 2018-10-09 RX ORDER — LEVETIRACETAM 10 MG/ML
1000 INJECTION INTRAVASCULAR EVERY 12 HOURS
Status: DISCONTINUED | OUTPATIENT
Start: 2018-10-09 | End: 2018-10-10

## 2018-10-09 RX ORDER — CALCIUM GLUCONATE 98 MG/ML
0.5 INJECTION, SOLUTION INTRAVENOUS ONCE
Status: COMPLETED | OUTPATIENT
Start: 2018-10-09 | End: 2018-10-09

## 2018-10-09 RX ORDER — METOPROLOL TARTRATE 25 MG/1
6.25 TABLET, FILM COATED ORAL 2 TIMES DAILY
Status: DISCONTINUED | OUTPATIENT
Start: 2018-10-10 | End: 2018-10-09

## 2018-10-09 RX ORDER — ENOXAPARIN SODIUM 100 MG/ML
40 INJECTION SUBCUTANEOUS EVERY 24 HOURS
Status: DISCONTINUED | OUTPATIENT
Start: 2018-10-09 | End: 2018-10-11

## 2018-10-09 RX ADMIN — ATORVASTATIN CALCIUM 80 MG: 40 TABLET, FILM COATED ORAL at 08:10

## 2018-10-09 RX ADMIN — FAMOTIDINE 20 MG: 10 INJECTION, SOLUTION INTRAVENOUS at 08:10

## 2018-10-09 RX ADMIN — PROPOFOL 50 MCG/KG/MIN: 10 INJECTION, EMULSION INTRAVENOUS at 08:10

## 2018-10-09 RX ADMIN — ACETAMINOPHEN 650 MG: 160 SOLUTION ORAL at 12:10

## 2018-10-09 RX ADMIN — PROPOFOL 50 MCG/KG/MIN: 10 INJECTION, EMULSION INTRAVENOUS at 04:10

## 2018-10-09 RX ADMIN — POTASSIUM CHLORIDE 40 MEQ: 400 INJECTION, SOLUTION INTRAVENOUS at 05:10

## 2018-10-09 RX ADMIN — PIPERACILLIN SODIUM AND TAZOBACTAM SODIUM 4.5 G: 4; .5 INJECTION, POWDER, LYOPHILIZED, FOR SOLUTION INTRAVENOUS at 12:10

## 2018-10-09 RX ADMIN — PIPERACILLIN SODIUM AND TAZOBACTAM SODIUM 4.5 G: 4; .5 INJECTION, POWDER, LYOPHILIZED, FOR SOLUTION INTRAVENOUS at 03:10

## 2018-10-09 RX ADMIN — LEVETIRACETAM INJECTION 1000 MG: 10 INJECTION INTRAVENOUS at 08:10

## 2018-10-09 RX ADMIN — PIPERACILLIN SODIUM AND TAZOBACTAM SODIUM 4.5 G: 4; .5 INJECTION, POWDER, LYOPHILIZED, FOR SOLUTION INTRAVENOUS at 08:10

## 2018-10-09 RX ADMIN — MAGNESIUM SULFATE HEPTAHYDRATE 2 G: 40 INJECTION, SOLUTION INTRAVENOUS at 11:10

## 2018-10-09 RX ADMIN — CALCIUM GLUCONATE 0.5 G: 98 INJECTION, SOLUTION INTRAVENOUS at 05:10

## 2018-10-09 RX ADMIN — ACETAMINOPHEN 650 MG: 160 SOLUTION ORAL at 05:10

## 2018-10-09 RX ADMIN — PROPOFOL 50 MCG/KG/MIN: 10 INJECTION, EMULSION INTRAVENOUS at 10:10

## 2018-10-09 RX ADMIN — ACETAMINOPHEN 650 MG: 160 SOLUTION ORAL at 11:10

## 2018-10-09 RX ADMIN — ASPIRIN 81 MG CHEWABLE TABLET 81 MG: 81 TABLET CHEWABLE at 08:10

## 2018-10-09 RX ADMIN — CLOPIDOGREL BISULFATE 75 MG: 75 TABLET ORAL at 08:10

## 2018-10-09 RX ADMIN — ACETAMINOPHEN 650 MG: 160 SOLUTION ORAL at 06:10

## 2018-10-09 RX ADMIN — LEVETIRACETAM INJECTION 1000 MG: 10 INJECTION INTRAVENOUS at 10:10

## 2018-10-09 RX ADMIN — PROPOFOL 50 MCG/KG/MIN: 10 INJECTION, EMULSION INTRAVENOUS at 05:10

## 2018-10-09 RX ADMIN — PROPOFOL 50 MCG/KG/MIN: 10 INJECTION, EMULSION INTRAVENOUS at 12:10

## 2018-10-09 RX ADMIN — CHLORHEXIDINE GLUCONATE 15 ML: 1.2 RINSE ORAL at 09:10

## 2018-10-09 RX ADMIN — ENOXAPARIN SODIUM 40 MG: 100 INJECTION SUBCUTANEOUS at 04:10

## 2018-10-09 RX ADMIN — METOPROLOL TARTRATE 12.5 MG: 25 TABLET, FILM COATED ORAL at 08:10

## 2018-10-09 RX ADMIN — CHLORHEXIDINE GLUCONATE 15 ML: 1.2 RINSE ORAL at 08:10

## 2018-10-09 RX ADMIN — SODIUM BICARBONATE: 84 INJECTION, SOLUTION INTRAVENOUS at 03:10

## 2018-10-09 RX ADMIN — SODIUM BICARBONATE: 84 INJECTION, SOLUTION INTRAVENOUS at 08:10

## 2018-10-09 RX ADMIN — SODIUM CHLORIDE 500 ML: 0.9 INJECTION, SOLUTION INTRAVENOUS at 11:10

## 2018-10-09 NOTE — PROGRESS NOTES
[facility] Hospital Medicine  Progress Note    Patient Name: Charline Messer  MRN: [id number]  Patient Class: IP- Inpatient   Admission Date: 10/7/2018  Length of Stay: 2 days  Attending Physician: Jose Eduardo Meek, *  Primary Care Provider: Primary Doctor No        Subjective:     Principal Problem:Anoxic brain injury    HPI:  Ms. Messer is a 83-year-old  female with PMH significant for HTN, CAD, was working at [employer], when she had a witnessed cardiac arrest at around midnight.  Patient was complaining of shortness of breath and collapsed.  Medical staff immediately started CPR, received two rounds of epinephrine, one round of atropine.  She was intubated on the scene and brought to the ED.  GCS upon arrival 3.  No corneal reflex, no gag reflex, not breathing over the vent.  Laboratory workup reveals lactic acid greater than 12, hypotensive with systolic in the 80s, initiated on Levophed drip.  Currently not on sedation except for IV Ativan as needed.  Chest x-ray reveals sternal fracture and multiple anterior rib fractures from the CPR earlier today.  CT head negative for ischemia or hemorrhage.  Patient had a pneumothorax, chest tube placed in the ED. Patient's  and son at the bedside hysterical.  Do not want to discuss any further resuscitative measures.  Patient remains full code at this time.  Patient to be initiated on hypothermia protocol.    Hospital Course:  84 y/o aaf admitted to icu  On mechanical ventilation   After cardiac arrest .  She was started on hypothermic  Protocol. Pt is s/p right side chest tube  2/2/ to  Traumatic pneumothorax . Patient with no cough, gag or corneal reflexes  .  There is no motor , sensory  Or painful response  . The TTE  Show  Normal EF , no WMA  And mild PHT . Pt was started on Cardene drip  Due to elevated bp    sbp > 200 . Pt  Became hypotensive and levophed was started to keep a map > 65 .  Pt was re- warm  .Now  she has new seizures onset .  The pt  Has  A corneal reflex and mild withdrawal to pain          Interval History: Pt was seen and examined at bedside .  She still requiring ventilation support .     Review of Systems   Unable to perform ROS: Intubated     Objective:     Vital Signs (Most Recent):  Temp: 98.6 °F (37 °C) (10/09/18 1400)  Pulse: 82 (10/09/18 1430)  Resp: 18 (10/09/18 1430)  BP: (!) 123/31 (10/09/18 1400)  SpO2: 100 % (10/09/18 1430) Vital Signs (24h Range):  Temp:  [92.7 °F (33.7 °C)-98.8 °F (37.1 °C)] 98.6 °F (37 °C)  Pulse:  [51-92] 82  Resp:  [18-34] 18  SpO2:  [91 %-100 %] 100 %  BP: (107-160)/(27-70) 123/31     Weight: 76.8 kg (169 lb 5 oz)  Body mass index is 33.24 kg/m².    Intake/Output Summary (Last 24 hours) at 10/9/2018 1604  Last data filed at 10/9/2018 1043  Gross per 24 hour   Intake 3644.98 ml   Output 415 ml   Net 3229.98 ml      Physical Exam   Constitutional: Vital signs are normal. She appears well-developed and well-nourished. She has a sickly appearance. She appears ill. No distress. She is sedated and intubated.   HENT:   Head: Normocephalic and atraumatic.   Mouth/Throat: Oropharynx is clear and moist.   Eyes: Conjunctivae and lids are normal. Pupils are equal, round, and reactive to light.   Neck: Trachea normal. Neck supple. No JVD present. Carotid bruit is not present. No tracheal deviation present. No thyroid mass and no thyromegaly present.       Cardiovascular: Normal rate, regular rhythm and intact distal pulses.   Pulses:       Radial pulses are 1+ on the right side, and 1+ on the left side.        Dorsalis pedis pulses are 1+ on the right side, and 1+ on the left side.   Pulmonary/Chest: She is intubated. No respiratory distress. She has wheezes. She has rhonchi. She has rales.           Abdominal: Soft. She exhibits no ascites and no mass. Bowel sounds are decreased.   Genitourinary:   Genitourinary Comments: Swain secured, patent, and draining   Musculoskeletal: She  exhibits no edema or deformity.   Diffuse mild edema with more so Right hand edema noted   Lymphadenopathy:     She has no cervical adenopathy.   Neurological: She is unresponsive.   Positive gag reflex but no corneal reflex. No Decorticate posturing.  Min withdrawal of LEs to pain.  Breathing above vent set rate.  No spont or purposeful movements off sedation.  Seizures with upward fixed gaze and biting on OET off sedation.    Skin: Skin is warm, dry and intact. Capillary refill takes less than 2 seconds. No rash noted. She is not diaphoretic.            Significant Labs: All pertinent labs within the past 24 hours have been reviewed.    Significant Imaging: I have reviewed all pertinent imaging results/findings within the past 24 hours.    Assessment/Plan:      * Anoxic brain injury    - cont hypothermia protocol  -neurology on board             Anemia and Thrombocytopenia    Unknown etiology           Anoxic seizures    Cont supportive  tx           Loculated right lateral pneumothorax    Chest tube per CT surgery           Lactic acidemia    Lactic acid greater than 12.  Secondary to cardiac arrest.  Repeat every 4 hr x2.  Continue IV vancomycin, IV Zosyn empirically.  Follow up on cultures.          Closed fracture of multiple ribs of right side    Sternal fracture, multiple rib fractures as a result of  CPR  S/P right side chest tube  Due to traumatic pneumothorax         Acute hypoxemic respiratory failure    2/2 to cardiopulmonary arrest   Cont ventilation support  . Wean  As tolerated         Cardiac arrest    -Unclear etiology.  CPR done for approximately 30 min per nursing staff.  -Initial rhythm was bradycardic, received atropine and epinephrine with return of spontaneous circulation after two rounds of epi.  -No corneal, no gag reflex.  Note on propofol drip..  -s/p hypothermia protocol   - Neurology on board               Coronary artery disease involving native coronary artery of native heart without  angina pectoris    Cont BB , asa , plavix  and statin   TTE show nl EF  And no WMA           PAD (peripheral artery disease)    Cont  Current  tx         Essential hypertension    Hold bp medication due to  Hypotension             VTE Risk Mitigation (From admission, onward)        Ordered     enoxaparin injection 40 mg  Daily      10/09/18 0920     IP VTE HIGH RISK PATIENT  Once      10/07/18 0922     Place sequential compression device  Until discontinued      10/07/18 0559          Critical care time spent on the evaluation and treatment of severe organ dysfunction, review of pertinent labs and imaging studies, discussions with consulting providers and discussions with patient/family: 35  minutes.    Jose Eduardo Meek MD  Department of Hospital Medicine   [facility]

## 2018-10-09 NOTE — ASSESSMENT & PLAN NOTE
TTM complete  Neuro following  Seizures off Propofol  ICU neuro monitoring  Supportive care  Propofol for underlying seizures  EEG revealing seizure activity  CT head unremarkable in ED for acute process

## 2018-10-09 NOTE — PROGRESS NOTES
[facility]  Critical Care Medicine  Progress Note    Patient Name: Charline Messer  MRN: [id number]  Admission Date: 10/7/2018  Hospital Length of Stay: 2 days  Code Status: DNR  Attending Provider: Jose Eduardo Meek, *  Primary Care Provider: Primary Doctor No   Principal Problem: Anoxic brain injury    Subjective:     HPI:  Ms Messer is a 82 yo BF with a PMH of CAD, CHF, HTN, HLD, Hypothyroidism, PAD, OA and Right Breast CA.  Per EMS, patient was working at [employer] [employer] last night when coworkers noticed patient was having difficulty breathing and then collapsed. EMS reports when they arrived patient was bradycardic in 40s and then shortly after pulse was lost. EMS and FD initiated CPR and administered epi and regained pulse. EMS reports shortly after, pulse was lost and another dose of epi was administered and regained pulse. Patient was intubated on scene with 7.0 ET tube.  She was presented to [facility] ED about 0230 hr this AM w/ GCS 3, LA > 12, SBP in 80s and started on Levophed infusion once CL obtained.  CT head unremarkable and CT chest with multiple right rib fractures.  She had right tension PTX and chest tube placed.          Hospital/ICU Course:  10/7 - Admitted to ICU this AM unresponsive on no sedation with some decorticate posturing.  SBP 180s off Levophed infusion.  Intubated on mech ventilation.  External cooling TTM started in ED.    10/8 -Remains unresponsive with decorticate posturing on no sedation. Some neurological improvement. Withdraws legs from painful stimuli. Pupils are reactive. Remains intubated on mech vent. BP is labile requiring alternating levophed and cardene.TTM continues. Good urine output.   10/9 - Re-warmed overnight completing TTM.  Silent Seizures this AM with attempted weaning of Propofol and confirmed with bedside EEG.  Still on vent with BP more stable not requiring Levophed or Cardene infusions    Review of Systems   Unable to perform ROS:  Intubated       Objective:     Vital Signs (Most Recent):  Temp: 98.4 °F (36.9 °C) (10/09/18 1100)  Pulse: 81 (10/09/18 1204)  Resp: 18 (10/09/18 1204)  BP: (!) 133/38 (10/09/18 1204)  SpO2: 100 % (10/09/18 1204) Vital Signs (24h Range):  Temp:  [92.1 °F (33.4 °C)-98.8 °F (37.1 °C)] 98.4 °F (36.9 °C)  Pulse:  [51-92] 81  Resp:  [17-34] 18  SpO2:  [91 %-100 %] 100 %  BP: ()/(23-70) 133/38     Weight: 76.8 kg (169 lb 5 oz)  Body mass index is 33.24 kg/m².      Intake/Output Summary (Last 24 hours) at 10/9/2018 1248  Last data filed at 10/9/2018 1043  Gross per 24 hour   Intake 3994.98 ml   Output 453 ml   Net 3541.98 ml       Physical Exam   Constitutional: Vital signs are normal. She appears well-developed and well-nourished. She has a sickly appearance. She appears ill. No distress. She is sedated and intubated.   HENT:   Head: Normocephalic and atraumatic.   Mouth/Throat: Oropharynx is clear and moist.   Eyes: Conjunctivae and lids are normal. Pupils are equal, round, and reactive to light.   Neck: Trachea normal. Neck supple. No JVD present. Carotid bruit is not present. No tracheal deviation present. No thyroid mass and no thyromegaly present.       Cardiovascular: Normal rate, regular rhythm and intact distal pulses.   Pulses:       Radial pulses are 1+ on the right side, and 1+ on the left side.        Dorsalis pedis pulses are 1+ on the right side, and 1+ on the left side.   Pulmonary/Chest: She is intubated. No respiratory distress. She has wheezes. She has rhonchi. She has rales.           Abdominal: Soft. She exhibits no ascites and no mass. Bowel sounds are decreased.   Genitourinary:   Genitourinary Comments: Swain secured, patent, and draining   Musculoskeletal: She exhibits no edema or deformity.   Diffuse mild edema with more so Right hand edema noted   Lymphadenopathy:     She has no cervical adenopathy.   Neurological: She is unresponsive.   Positive gag reflex but no corneal reflex. No  Decorticate posturing.  Min withdrawal of LEs to pain.  Breathing above vent set rate.  No spont or purposeful movements off sedation.  Seizures with upward fixed gaze and biting on OET off sedation.    Skin: Skin is warm, dry and intact. Capillary refill takes less than 2 seconds. No rash noted. She is not diaphoretic.            Vents:  Vent Mode: A/C (10/09/18 1204)  Set Rate: 18 bmp (10/09/18 1204)  Vt Set: 0 mL (10/09/18 1204)  Pressure Support: 0 cmH20 (10/09/18 1204)  PEEP/CPAP: 5 cmH20 (10/09/18 1204)  Oxygen Concentration (%): 35 (10/09/18 1204)  Peak Airway Pressure: 28 cmH2O (10/09/18 1204)  Plateau Pressure: 0 cmH20 (10/09/18 1204)  Total Ve: 7.39 mL (10/09/18 1204)  F/VT Ratio<105 (RSBI): (!) 43.8 (10/09/18 1204)    Lines/Drains/Airways     Central Venous Catheter Line                 Percutaneous Central Line Insertion/Assessment - triple lumen  10/07/18 1105 left internal jugular 2 days          Drain                 Chest Tube 10/07/18 1238 1 Right Fourth intercostal space 24 Fr. 2 days         NG/OG Tube 10/07/18 0230 orogastric 16 Fr. Right mouth 2 days         Urethral Catheter 10/07/18 1200 Temperature probe 16 Fr. 2 days          Airway                 Airway - Non-Surgical 10/07/18 0145 Endotracheal Tube 2 days          Arterial Line                 Arterial Line 10/07/18 1100 Left Radial 2 days          Peripheral Intravenous Line                 Peripheral IV - Double Lumen 10/07/18 0157 Right Antecubital 2 days                Significant Labs:    CBC/Anemia Profile:  Recent Labs   Lab  10/08/18   0400  10/08/18   1608  10/09/18   0415   WBC  16.15*  15.43*  13.12*   HGB  10.0*  8.8*  8.6*   HCT  30.8*  26.9*  26.1*   PLT  129*  111*  93*   MCV  86  85  85   RDW  13.7  14.2  14.4        Chemistries:  Recent Labs   Lab  10/08/18   0400   10/09/18   0015  10/09/18   0415  10/09/18   0750   NA  134*   < >  134*  135*  135*   K  4.4   < >  4.1  3.9  4.7   CL  109   < >  107  107  107   CO2  14*    < >  17*  18*  18*   BUN  20   < >  18  18  17   CREATININE  0.8   < >  0.8  0.8  0.9   CALCIUM  7.1*   < >  6.8*  6.9*  7.0*   ALBUMIN  2.5*   --    --    --    --    PROT  5.1*   --    --    --    --    BILITOT  0.4   --    --    --    --    ALKPHOS  109   --    --    --    --    ALT  58*   --    --    --    --    AST  69*   --    --    --    --    MG  2.3   < >  2.0  1.9  1.8   PHOS  2.6*   < >  3.2  3.0  3.1    < > = values in this interval not displayed.       ABGs:   Recent Labs   Lab  10/09/18   0812   PH  7.473*   PCO2  27.8*   HCO3  20.4*   POCSATURATED  99   BE  -3     All pertinent labs within the past 24 hours have been reviewed.    Significant Imaging:  CXR: I have reviewed all pertinent results/findings within the past 24 hours and my personal findings are:  mild interstitial infiltrates essentially unchanged from prior film and no PTX noted with chest tube in place      Assessment/Plan:     Neuro   * Anoxic brain injury    TTM complete  Neuro following  Seizures off Propofol  ICU neuro monitoring  Supportive care  Propofol for underlying seizures  EEG revealing seizure activity  CT head unremarkable in ED for acute process        Anoxic seizures    Cont Propofol infusion and start Keppra  Neuro following        Pulmonary   Acute hypoxemic respiratory failure    Cont full vent support  Vent settings reviewed   VAP prophylaxis  SAT/SBT post neuro improvement and no seizures off Propofol        Cardiac/Vascular   Coronary artery disease involving native coronary artery of native heart without angina pectoris    Continue home Plavix, statin and ASA  Continue low dose Lopressor as BP tolerates  ICU cardiac monitoring  Echo EF 55%        PAD (peripheral artery disease)    Continue home Plavix, statin and ASA        Essential hypertension    Continue to control with Propofol  infusion  Arterial line hemodynamic monitoring        Cardiac arrest    ICU cardiac monitoring  Echo preserved systolic function with  EF 55%  Cont supportive care        Renal/   Metabolic acidosis    Cont IVFs w/ Bicarb  Follow up BMPs  Good urine output          Oncology   Anemia and Thrombocytopenia    No active bleeding  Conservative transfusion protocol  Follow CBC  Will stop LMWH if Plts cont to drop        Endocrine   Hyperglycemia, unspecified    Good glucose control  Continue SSI        Orthopedic   Closed fracture of multiple ribs of right side    Right chest tube for PTX, remains secured, patent, and draining.        Other   Loculated right lateral pneumothorax    Chest tube secured and cont suction  Daily CXR  Blood cultures no growth  Sputum cultures rare gram + cocci and gram - rods  Continue zosyn  Follow fever curve and repeat CBC in AM          Preventive Measures and Monitoring:   Stress Ulcer: Pepcid  Nutrition: start TF  Glucose control: SSI  Bowel prophylaxis: Miralax  DVT prophylaxis: SCDs and LMWH  Hx CAD on B-Blocker: Lopressor  Head of Bed/Reposition: Elevate HOB and turn Q1-2 hours   Early Mobility: bed rest  SAT/SBT: post seizure control  Vent Day: #3  OG Day: #3  Central Line Left IJ Day: #3  Right chest tube Day: #3  Left Radial Arterial Line Day: #3  Swain Day: #3  IVAB Day: #3  Code Status: Full  Pneumonia Vaccine: UTD  Flu Vaccine: ordered     Counseling/Consultation:I have discussed the care of this patient in detail with the bedside nursing staff and Dr. Guillen and Dr. Redd.  I have discussed care with family of spouse and son every day including today and all questions answered.     Critical Care Time: 58 minutes  Critical secondary to Patient has a condition that poses threat to life and bodily function: Resp Failure on mech vent  Patient has an abrupt change in neurologic status: Anoxic Brain Injury and Seizures  Patient is currently receiving parenteral controlled substances: Propofol infusion      Critical care was time spent personally by me on the following activities: development of treatment plan  with patient or surrogate and bedside caregivers, discussions with consultants, evaluation of patient's response to treatment, examination of patient, ordering and performing treatments and interventions, ordering and review of laboratory studies, ordering and review of radiographic studies, pulse oximetry, re-evaluation of patient's condition. This critical care time did not overlap with that of any other provider or involve time for any procedures.     Wes Padilla NP  Critical Care Medicine  [facility]

## 2018-10-09 NOTE — SUBJECTIVE & OBJECTIVE
Review of Systems   Unable to perform ROS: Intubated       Objective:     Vital Signs (Most Recent):  Temp: 98.4 °F (36.9 °C) (10/09/18 1100)  Pulse: 81 (10/09/18 1204)  Resp: 18 (10/09/18 1204)  BP: (!) 133/38 (10/09/18 1204)  SpO2: 100 % (10/09/18 1204) Vital Signs (24h Range):  Temp:  [92.1 °F (33.4 °C)-98.8 °F (37.1 °C)] 98.4 °F (36.9 °C)  Pulse:  [51-92] 81  Resp:  [17-34] 18  SpO2:  [91 %-100 %] 100 %  BP: ()/(23-70) 133/38     Weight: 76.8 kg (169 lb 5 oz)  Body mass index is 33.24 kg/m².      Intake/Output Summary (Last 24 hours) at 10/9/2018 1248  Last data filed at 10/9/2018 1043  Gross per 24 hour   Intake 3994.98 ml   Output 453 ml   Net 3541.98 ml       Physical Exam   Constitutional: Vital signs are normal. She appears well-developed and well-nourished. She has a sickly appearance. She appears ill. No distress. She is sedated and intubated.   HENT:   Head: Normocephalic and atraumatic.   Mouth/Throat: Oropharynx is clear and moist.   Eyes: Conjunctivae and lids are normal. Pupils are equal, round, and reactive to light.   Neck: Trachea normal. Neck supple. No JVD present. Carotid bruit is not present. No tracheal deviation present. No thyroid mass and no thyromegaly present.       Cardiovascular: Normal rate, regular rhythm and intact distal pulses.   Pulses:       Radial pulses are 1+ on the right side, and 1+ on the left side.        Dorsalis pedis pulses are 1+ on the right side, and 1+ on the left side.   Pulmonary/Chest: She is intubated. No respiratory distress. She has wheezes. She has rhonchi. She has rales.           Abdominal: Soft. She exhibits no ascites and no mass. Bowel sounds are decreased.   Genitourinary:   Genitourinary Comments: Swain secured, patent, and draining   Musculoskeletal: She exhibits no edema or deformity.   Diffuse mild edema with more so Right hand edema noted   Lymphadenopathy:     She has no cervical adenopathy.   Neurological: She is unresponsive.   Positive  gag reflex but no corneal reflex. No Decorticate posturing.  Min withdrawal of LEs to pain.  Breathing above vent set rate.  No spont or purposeful movements off sedation.  Seizures with upward fixed gaze and biting on OET off sedation.    Skin: Skin is warm, dry and intact. Capillary refill takes less than 2 seconds. No rash noted. She is not diaphoretic.            Vents:  Vent Mode: A/C (10/09/18 1204)  Set Rate: 18 bmp (10/09/18 1204)  Vt Set: 0 mL (10/09/18 1204)  Pressure Support: 0 cmH20 (10/09/18 1204)  PEEP/CPAP: 5 cmH20 (10/09/18 1204)  Oxygen Concentration (%): 35 (10/09/18 1204)  Peak Airway Pressure: 28 cmH2O (10/09/18 1204)  Plateau Pressure: 0 cmH20 (10/09/18 1204)  Total Ve: 7.39 mL (10/09/18 1204)  F/VT Ratio<105 (RSBI): (!) 43.8 (10/09/18 1204)    Lines/Drains/Airways     Central Venous Catheter Line                 Percutaneous Central Line Insertion/Assessment - triple lumen  10/07/18 1105 left internal jugular 2 days          Drain                 Chest Tube 10/07/18 1238 1 Right Fourth intercostal space 24 Fr. 2 days         NG/OG Tube 10/07/18 0230 orogastric 16 Fr. Right mouth 2 days         Urethral Catheter 10/07/18 1200 Temperature probe 16 Fr. 2 days          Airway                 Airway - Non-Surgical 10/07/18 0145 Endotracheal Tube 2 days          Arterial Line                 Arterial Line 10/07/18 1100 Left Radial 2 days          Peripheral Intravenous Line                 Peripheral IV - Double Lumen 10/07/18 0157 Right Antecubital 2 days                Significant Labs:    CBC/Anemia Profile:  Recent Labs   Lab  10/08/18   0400  10/08/18   1608  10/09/18   0415   WBC  16.15*  15.43*  13.12*   HGB  10.0*  8.8*  8.6*   HCT  30.8*  26.9*  26.1*   PLT  129*  111*  93*   MCV  86  85  85   RDW  13.7  14.2  14.4        Chemistries:  Recent Labs   Lab  10/08/18   0400   10/09/18   0015  10/09/18   0415  10/09/18   0750   NA  134*   < >  134*  135*  135*   K  4.4   < >  4.1  3.9  4.7   CL   109   < >  107  107  107   CO2  14*   < >  17*  18*  18*   BUN  20   < >  18  18  17   CREATININE  0.8   < >  0.8  0.8  0.9   CALCIUM  7.1*   < >  6.8*  6.9*  7.0*   ALBUMIN  2.5*   --    --    --    --    PROT  5.1*   --    --    --    --    BILITOT  0.4   --    --    --    --    ALKPHOS  109   --    --    --    --    ALT  58*   --    --    --    --    AST  69*   --    --    --    --    MG  2.3   < >  2.0  1.9  1.8   PHOS  2.6*   < >  3.2  3.0  3.1    < > = values in this interval not displayed.       ABGs:   Recent Labs   Lab  10/09/18   0812   PH  7.473*   PCO2  27.8*   HCO3  20.4*   POCSATURATED  99   BE  -3     All pertinent labs within the past 24 hours have been reviewed.    Significant Imaging:  CXR: I have reviewed all pertinent results/findings within the past 24 hours and my personal findings are:  mild interstitial infiltrates essentially unchanged from prior film and no PTX noted with chest tube in place

## 2018-10-09 NOTE — EICU
Compensated Metabolic acidosis with mild resp alkalosis.  On AC PCV mode on Vent.  Rate at 18.  Not breathing over the vent.  On hypothermia protocol.  Continue care.

## 2018-10-09 NOTE — ASSESSMENT & PLAN NOTE
-Unclear etiology.  CPR done for approximately 30 min per nursing staff.  -Initial rhythm was bradycardic, received atropine and epinephrine with return of spontaneous circulation after two rounds of epi.  -No corneal, no gag reflex.  Note on propofol drip..  -s/p hypothermia protocol   - Neurology on board

## 2018-10-09 NOTE — SUBJECTIVE & OBJECTIVE
Interval History: Pt was seen and examined at bedside .  She still requiring ventilation support .     Review of Systems   Unable to perform ROS: Intubated     Objective:     Vital Signs (Most Recent):  Temp: 98.6 °F (37 °C) (10/09/18 1400)  Pulse: 82 (10/09/18 1430)  Resp: 18 (10/09/18 1430)  BP: (!) 123/31 (10/09/18 1400)  SpO2: 100 % (10/09/18 1430) Vital Signs (24h Range):  Temp:  [92.7 °F (33.7 °C)-98.8 °F (37.1 °C)] 98.6 °F (37 °C)  Pulse:  [51-92] 82  Resp:  [18-34] 18  SpO2:  [91 %-100 %] 100 %  BP: (107-160)/(27-70) 123/31     Weight: 76.8 kg (169 lb 5 oz)  Body mass index is 33.24 kg/m².    Intake/Output Summary (Last 24 hours) at 10/9/2018 1604  Last data filed at 10/9/2018 1043  Gross per 24 hour   Intake 3644.98 ml   Output 415 ml   Net 3229.98 ml      Physical Exam   Constitutional: Vital signs are normal. She appears well-developed and well-nourished. She has a sickly appearance. She appears ill. No distress. She is sedated and intubated.   HENT:   Head: Normocephalic and atraumatic.   Mouth/Throat: Oropharynx is clear and moist.   Eyes: Conjunctivae and lids are normal. Pupils are equal, round, and reactive to light.   Neck: Trachea normal. Neck supple. No JVD present. Carotid bruit is not present. No tracheal deviation present. No thyroid mass and no thyromegaly present.       Cardiovascular: Normal rate, regular rhythm and intact distal pulses.   Pulses:       Radial pulses are 1+ on the right side, and 1+ on the left side.        Dorsalis pedis pulses are 1+ on the right side, and 1+ on the left side.   Pulmonary/Chest: She is intubated. No respiratory distress. She has wheezes. She has rhonchi. She has rales.           Abdominal: Soft. She exhibits no ascites and no mass. Bowel sounds are decreased.   Genitourinary:   Genitourinary Comments: Swain secured, patent, and draining   Musculoskeletal: She exhibits no edema or deformity.   Diffuse mild edema with more so Right hand edema noted    Lymphadenopathy:     She has no cervical adenopathy.   Neurological: She is unresponsive.   Positive gag reflex but no corneal reflex. No Decorticate posturing.  Min withdrawal of LEs to pain.  Breathing above vent set rate.  No spont or purposeful movements off sedation.  Seizures with upward fixed gaze and biting on OET off sedation.    Skin: Skin is warm, dry and intact. Capillary refill takes less than 2 seconds. No rash noted. She is not diaphoretic.            Significant Labs: All pertinent labs within the past 24 hours have been reviewed.    Significant Imaging: I have reviewed all pertinent imaging results/findings within the past 24 hours.

## 2018-10-09 NOTE — ASSESSMENT & PLAN NOTE
Cont full vent support  Vent settings reviewed   VAP prophylaxis  SAT/SBT post neuro improvement and no seizures off Propofol

## 2018-10-09 NOTE — ASSESSMENT & PLAN NOTE
Chest tube secured and cont suction  Daily CXR  Blood cultures no growth  Sputum cultures rare gram + cocci and gram - rods  Continue zosyn  Follow fever curve and repeat CBC in AM

## 2018-10-09 NOTE — PROGRESS NOTES
Progress note  Neurology      Neurology follow up:  For: Post Cardiac arrest , neuro follow up.    SUBJECTIVE:      HPI:   This morning around 10 am, EEG tech. Called that she was having   Seizure while recording EEG. She did show some twitching of eye lids . EEG was ordered yesterday. Dr. Gutierrez came down and saw the EEG and confirmed the diagnosis. Keppra was started on and propofol was back turned on.      Past Medical History:   Diagnosis Date    Acute hypoxemic respiratory failure 10/7/2018    RYLIE (acute kidney injury) 10/7/2018    Allergic rhinitis, cause unspecified     Anemia, unspecified 10/9/2018    Anoxic seizures 10/9/2018    Arthritis     Benign colonic polyp     Breast cancer mid 1980s    right; per patient s/p right mastectomy and chemo, unsure about radiation    CHF (congestive heart failure)     Coronary artery disease     Coronary artery disease involving native coronary artery of native heart without angina pectoris 8/2/2017    Depression     Diverticular disease     External hemorrhoids     Gout attack     Hyperlipidemia     Hypertension     Hypothyroidism     Obesity (BMI 30-39.9) 10/24/2016    Osteoarthritis     Peripheral vascular disease     Postmenopausal     Pulmonary embolism     reported per patient; unsure date    ST elevation myocardial infarction (STEMI) of anterior wall 6/27/2017    Thrombocytopenia     Tinnitus aurium     Vitamin D deficiency disease      Past Surgical History:   Procedure Laterality Date    BREAST SURGERY      CARDIAC CATHETERIZATION      HEART CATH-LEFT Left 6/27/2017    Performed by [facility] at [facility]    PARTIAL HYSTERECTOMY      Due to fibroids    Right mastectomy       Family History   Problem Relation Age of Onset    Heart disease Mother     Hypertension Mother     Stroke Mother     Hypertension Father     Heart disease Sister     No Known Problems Son     Cancer Neg Hx     Diabetes Neg Hx     Kidney  disease Neg Hx      Social History     Tobacco Use    Smoking status: Never Smoker    Smokeless tobacco: Never Used   Substance Use Topics    Alcohol use: Yes     Comment: ocassionally    Drug use: No       Review of patient's allergies indicates:   Allergen Reactions    Codeine Nausea And Vomiting    Ibuprofen Hives and Itching           Zetia [ezetimibe] Nausea And Vomiting      Patient Active Problem List   Diagnosis    Essential hypertension    Allergic rhinitis, cause unspecified    Hyperlipidemia    Generalized osteoarthrosis, involving multiple sites    Gout, arthritis    Vitamin D insufficiency    PAD (peripheral artery disease)    Atherosclerosis of both carotid arteries    History of breast cancer    Thrombocytopenia    Obesity (BMI 30-39.9)    ST elevation myocardial infarction (STEMI) of anterior wall    AMI anterior wall    Coronary artery disease involving native coronary artery of native heart without angina pectoris    Cardiac arrest    Acute hypoxemic respiratory failure    Closed fracture of multiple ribs of right side    Lactic acidemia    Loculated right lateral pneumothorax    Metabolic acidosis    Hyperglycemia, unspecified    Anoxic brain injury    Anoxic seizures    Anemia and Thrombocytopenia         Scheduled Meds:   acetaminophen  650 mg Per NG tube Q6H    aspirin  81 mg Oral Daily    atorvastatin  80 mg Oral Daily    busPIRone  30 mg Per NG tube Once    chlorhexidine  15 mL Mouth/Throat BID    clopidogrel  75 mg Oral Daily    enoxaparin  40 mg Subcutaneous Daily    famotidine (PF)  20 mg Intravenous Daily    levetiracetam IVPB  1,000 mg Intravenous Q12H    metoprolol tartrate  12.5 mg Oral BID    piperacillin-tazobactam 4.5 g in dextrose 5 % 100 mL IVPB (ready to mix system)  4.5 g Intravenous Q8H    polyethylene glycol  17 g Oral Daily     Continuous Infusions:   niCARdipine Stopped (10/07/18 1900)    propofol 50 mcg/kg/min (10/09/18 1619)     custom IV infusion builder 125 mL/hr at 10/09/18 0600     PRN Meds:bisacodyl, dextrose 50%, glucagon (human recombinant), influenza, insulin aspart U-100, magnesium sulfate IVPB, magnesium sulfate IVPB, potassium chloride in water **AND** potassium chloride in water **AND** potassium chloride in water, sodium phosphate IVPB, sodium phosphate IVPB, sodium phosphate IVPB      Review of Systems:   Review of systems not obtained due to patient factors she is in coma and intubated..        OBJECTIVE:     Vital Signs (Most Recent)  Temp: 98.6 °F (37 °C) (10/09/18 1620)  Pulse: 87 (10/09/18 1600)  Resp: 17 (10/09/18 1600)  BP: (!) 156/100 (10/09/18 1600)  SpO2: (!) 92 % (10/09/18 1600)    Vital Signs Range (Last 24H):  Temp:  [92.7 °F (33.7 °C)-98.8 °F (37.1 °C)]   Pulse:  [53-92]   Resp:  [17-34]   BP: (107-160)/()   SpO2:  [91 %-100 %]       Physical Exam:  She is nonverbal, in coma and intubated.  There is no nystagmus.     [place] Coma Scale (GCS)    Score   EYE OPENING   Spontaneous 4   Response to verbal command 3   Response to pain 2   No eye opening 1               VERBAL response   Oriented 5   Confused 4   Inappropriate words 3   Incomprehensible sounds 2   No verbal response 1         MOTOR response   Obeys commands 6   Localizing response to pain 5   Withdrawal response to pain 4   Flexion to pain 3   Extension to pain 2   No motor response 1   Total  6     The GCS is scored between 3 and 15, 3 being the worst and 15 the best. It is composed of three parameters: best eye response (E), best verbal response (V), and best motor response (M). The components of the GCS should be recorded individually; for example, [id number] results in a GCS score of 9. A score of 13 or higher correlates with mild brain injury, a score of 9 to 12 correlates with moderate injury, and a score of 8 or less represents severe brain injury.        Laboratory:  Lab Results   Component Value Date    WBC 13.12 (H) 10/09/2018    HGB 8.6 (L)  10/09/2018    HCT 26.1 (L) 10/09/2018    PLT 93 (L) 10/09/2018    CHOL 165 07/31/2018    TRIG 53 07/31/2018    HDL 57 07/31/2018    ALT 58 (H) 10/08/2018    AST 69 (H) 10/08/2018     10/09/2018    K 4.3 10/09/2018     10/09/2018    CREATININE 0.9 10/09/2018    BUN 17 10/09/2018    CO2 20 (L) 10/09/2018    TSH 3.104 10/07/2018    INR 1.1 10/09/2018           Diagnostic Results:  CT head reviewed.      ASSESSMENT/PLAN:     Assessment:   1. Cardiac arrest : Clinical history indicates that possibility of hypoxic damage to the brain happened. It is a little early for prediction .  2. Seizure: Post hypoxic injury to the brain can provoke seizure and we will treat.     Patient Active Problem List   Diagnosis    Essential hypertension    Allergic rhinitis, cause unspecified    Hyperlipidemia    Generalized osteoarthrosis, involving multiple sites    Gout, arthritis    Vitamin D insufficiency    PAD (peripheral artery disease)    Atherosclerosis of both carotid arteries    History of breast cancer    Thrombocytopenia    Obesity (BMI 30-39.9)    ST elevation myocardial infarction (STEMI) of anterior wall    AMI anterior wall    Coronary artery disease involving native coronary artery of native heart without angina pectoris    Cardiac arrest    Acute hypoxemic respiratory failure    Closed fracture of multiple ribs of right side    Lactic acidemia    Loculated right lateral pneumothorax    Metabolic acidosis    Hyperglycemia, unspecified    Anoxic brain injury    Anoxic seizures    Anemia and Thrombocytopenia         Plan:  Discussed with Hospital medicine , Her son and nurse .  Will follow.

## 2018-10-09 NOTE — ASSESSMENT & PLAN NOTE
No active bleeding  Conservative transfusion protocol  Follow CBC  Will stop LMWH if Plts cont to drop

## 2018-10-09 NOTE — PROCEDURES
"Charline Messer is a 83 y.o. female patient.    Temp: 98.6 °F (37 °C) (10/09/18 1400)  Pulse: 82 (10/09/18 1430)  Resp: 18 (10/09/18 1430)  BP: (!) 123/31 (10/09/18 1400)  SpO2: 100 % (10/09/18 1430)  Weight: 76.8 kg (169 lb 5 oz) (10/08/18 0836)  Height: 4' 11.84" (152 cm) (10/08/18 0839)         Referring physician: Dr. Gutierrez     Technician : Marquis Perez     Recording time:               20 minutes    Artifacts:   Eye movements , muscle .    Age:                83                                  Sex: F      History:     83 year old lady admitted with post Cardiac arrest resuscitation. EEG has been ordered to evaluate encephalopathy. EEG was done while Propofol drip was turned off..  Technique : Electrodes are placed according to International 10-20 system . Bipolar and referential montages are used. Hyperventilation was  Not done   . Photic was not done.      Findings:  This is a multichannel digital EEG recording using the international 10-20 placement     system.   There was continuous spike waves activities through out the screen. No physiological alpha or beta waves. There is no focal  Attenuation or electrographic silence.     IMPRESSION:   EEG is abnormal consistent with seizure activities. AED has been advised.      Ada Gutierrez  10/9/2018  "

## 2018-10-10 PROBLEM — E87.20 METABOLIC ACIDOSIS: Status: RESOLVED | Noted: 2018-10-07 | Resolved: 2018-10-10

## 2018-10-10 PROBLEM — S22.41XA TRAUMATIC FRACTURE OF RIBS WITH PNEUMOTHORAX, RIGHT, CLOSED, INITIAL ENCOUNTER: Status: ACTIVE | Noted: 2018-10-07

## 2018-10-10 PROBLEM — S27.0XXA TRAUMATIC FRACTURE OF RIBS WITH PNEUMOTHORAX, RIGHT, CLOSED, INITIAL ENCOUNTER: Status: ACTIVE | Noted: 2018-10-07

## 2018-10-10 LAB
ALBUMIN SERPL BCP-MCNC: 2.1 G/DL
ALBUMIN SERPL BCP-MCNC: 2.2 G/DL
ALLENS TEST: ABNORMAL
ALP SERPL-CCNC: 82 U/L
ALP SERPL-CCNC: 83 U/L
ALT SERPL W/O P-5'-P-CCNC: 36 U/L
ALT SERPL W/O P-5'-P-CCNC: 40 U/L
ANION GAP SERPL CALC-SCNC: 8 MMOL/L
ANION GAP SERPL CALC-SCNC: 8 MMOL/L
APTT BLDCRRT: 30.6 SEC
AST SERPL-CCNC: 83 U/L
AST SERPL-CCNC: 95 U/L
BASOPHILS # BLD AUTO: 0.01 K/UL
BASOPHILS # BLD AUTO: 0.02 K/UL
BASOPHILS NFR BLD: 0.1 %
BASOPHILS NFR BLD: 0.2 %
BILIRUB SERPL-MCNC: 0.4 MG/DL
BILIRUB SERPL-MCNC: 0.5 MG/DL
BUN SERPL-MCNC: 14 MG/DL
BUN SERPL-MCNC: 16 MG/DL
CALCIUM SERPL-MCNC: 7 MG/DL
CALCIUM SERPL-MCNC: 7.1 MG/DL
CHLORIDE SERPL-SCNC: 108 MMOL/L
CHLORIDE SERPL-SCNC: 108 MMOL/L
CO2 SERPL-SCNC: 23 MMOL/L
CO2 SERPL-SCNC: 24 MMOL/L
CREAT SERPL-MCNC: 0.8 MG/DL
CREAT SERPL-MCNC: 0.9 MG/DL
DELSYS: ABNORMAL
DIFFERENTIAL METHOD: ABNORMAL
DIFFERENTIAL METHOD: ABNORMAL
EOSINOPHIL # BLD AUTO: 0.3 K/UL
EOSINOPHIL # BLD AUTO: 0.4 K/UL
EOSINOPHIL NFR BLD: 3.6 %
EOSINOPHIL NFR BLD: 4.1 %
ERYTHROCYTE [DISTWIDTH] IN BLOOD BY AUTOMATED COUNT: 15.1 %
ERYTHROCYTE [DISTWIDTH] IN BLOOD BY AUTOMATED COUNT: 15.2 %
ERYTHROCYTE [SEDIMENTATION RATE] IN BLOOD BY WESTERGREN METHOD: 18 MM/H
EST. GFR  (AFRICAN AMERICAN): >60 ML/MIN/1.73 M^2
EST. GFR  (AFRICAN AMERICAN): >60 ML/MIN/1.73 M^2
EST. GFR  (NON AFRICAN AMERICAN): >60 ML/MIN/1.73 M^2
EST. GFR  (NON AFRICAN AMERICAN): >60 ML/MIN/1.73 M^2
FIO2: 35
GIANT PLATELETS BLD QL SMEAR: PRESENT
GLUCOSE SERPL-MCNC: 76 MG/DL
GLUCOSE SERPL-MCNC: 87 MG/DL
HCO3 UR-SCNC: 25.5 MMOL/L (ref 24–28)
HCT VFR BLD AUTO: 24.6 %
HCT VFR BLD AUTO: 25.3 %
HGB BLD-MCNC: 7.9 G/DL
HGB BLD-MCNC: 7.9 G/DL
INR PPP: 1
IP: 23
IT: 0.71
LYMPHOCYTES # BLD AUTO: 1 K/UL
LYMPHOCYTES # BLD AUTO: 1.2 K/UL
LYMPHOCYTES NFR BLD: 12.8 %
LYMPHOCYTES NFR BLD: 9.6 %
MAGNESIUM SERPL-MCNC: 2.2 MG/DL
MCH RBC QN AUTO: 27.3 PG
MCH RBC QN AUTO: 28.2 PG
MCHC RBC AUTO-ENTMCNC: 31.2 G/DL
MCHC RBC AUTO-ENTMCNC: 32.1 G/DL
MCV RBC AUTO: 88 FL
MCV RBC AUTO: 88 FL
MODE: ABNORMAL
MONOCYTES # BLD AUTO: 0.3 K/UL
MONOCYTES # BLD AUTO: 0.4 K/UL
MONOCYTES NFR BLD: 3.3 %
MONOCYTES NFR BLD: 3.9 %
NEUTROPHILS # BLD AUTO: 7.3 K/UL
NEUTROPHILS # BLD AUTO: 8.4 K/UL
NEUTROPHILS NFR BLD: 79.6 %
NEUTROPHILS NFR BLD: 83.1 %
NSE SERPL-MCNC: 87 NG/ML
PCO2 BLDA: 41.6 MMHG (ref 35–45)
PEEP: 5
PH SMN: 7.4 [PH] (ref 7.35–7.45)
PHOSPHATE SERPL-MCNC: 2.9 MG/DL
PLATELET # BLD AUTO: 97 K/UL
PLATELET # BLD AUTO: 97 K/UL
PLATELET BLD QL SMEAR: ABNORMAL
PMV BLD AUTO: 13.4 FL
PMV BLD AUTO: ABNORMAL FL
PO2 BLDA: 110 MMHG (ref 80–100)
POC BE: 1 MMOL/L
POC SATURATED O2: 98 % (ref 95–100)
POCT GLUCOSE: 89 MG/DL (ref 70–110)
POCT GLUCOSE: >500 MG/DL (ref 70–110)
POTASSIUM SERPL-SCNC: 3.5 MMOL/L
POTASSIUM SERPL-SCNC: 4 MMOL/L
PROT SERPL-MCNC: 4.9 G/DL
PROT SERPL-MCNC: 5 G/DL
PROTHROMBIN TIME: 10.7 SEC
RBC # BLD AUTO: 2.8 M/UL
RBC # BLD AUTO: 2.89 M/UL
SAMPLE: ABNORMAL
SITE: ABNORMAL
SODIUM SERPL-SCNC: 139 MMOL/L
SODIUM SERPL-SCNC: 140 MMOL/L
WBC # BLD AUTO: 10.12 K/UL
WBC # BLD AUTO: 9.17 K/UL

## 2018-10-10 PROCEDURE — 63600175 PHARM REV CODE 636 W HCPCS: Performed by: NURSE PRACTITIONER

## 2018-10-10 PROCEDURE — 99900035 HC TECH TIME PER 15 MIN (STAT)

## 2018-10-10 PROCEDURE — S0028 INJECTION, FAMOTIDINE, 20 MG: HCPCS | Performed by: NURSE PRACTITIONER

## 2018-10-10 PROCEDURE — 99233 SBSQ HOSP IP/OBS HIGH 50: CPT | Mod: ,,, | Performed by: PSYCHIATRY & NEUROLOGY

## 2018-10-10 PROCEDURE — 37799 UNLISTED PX VASCULAR SURGERY: CPT

## 2018-10-10 PROCEDURE — 20000000 HC ICU ROOM

## 2018-10-10 PROCEDURE — 83735 ASSAY OF MAGNESIUM: CPT

## 2018-10-10 PROCEDURE — 25000003 PHARM REV CODE 250

## 2018-10-10 PROCEDURE — 63600175 PHARM REV CODE 636 W HCPCS: Performed by: INTERNAL MEDICINE

## 2018-10-10 PROCEDURE — 25000003 PHARM REV CODE 250: Performed by: NURSE PRACTITIONER

## 2018-10-10 PROCEDURE — 85025 COMPLETE CBC W/AUTO DIFF WBC: CPT

## 2018-10-10 PROCEDURE — 83520 IMMUNOASSAY QUANT NOS NONAB: CPT

## 2018-10-10 PROCEDURE — 25000003 PHARM REV CODE 250: Performed by: INTERNAL MEDICINE

## 2018-10-10 PROCEDURE — 99900026 HC AIRWAY MAINTENANCE (STAT)

## 2018-10-10 PROCEDURE — 82803 BLOOD GASES ANY COMBINATION: CPT

## 2018-10-10 PROCEDURE — 94003 VENT MGMT INPAT SUBQ DAY: CPT

## 2018-10-10 PROCEDURE — 99291 CRITICAL CARE FIRST HOUR: CPT | Mod: ,,, | Performed by: NURSE PRACTITIONER

## 2018-10-10 PROCEDURE — 85730 THROMBOPLASTIN TIME PARTIAL: CPT

## 2018-10-10 PROCEDURE — 85610 PROTHROMBIN TIME: CPT

## 2018-10-10 PROCEDURE — 80053 COMPREHEN METABOLIC PANEL: CPT | Mod: 91

## 2018-10-10 PROCEDURE — 84100 ASSAY OF PHOSPHORUS: CPT

## 2018-10-10 RX ORDER — SODIUM CHLORIDE 9 MG/ML
INJECTION, SOLUTION INTRAVENOUS CONTINUOUS
Status: DISCONTINUED | OUTPATIENT
Start: 2018-10-10 | End: 2018-10-13 | Stop reason: HOSPADM

## 2018-10-10 RX ORDER — METOPROLOL TARTRATE 1 MG/ML
INJECTION, SOLUTION INTRAVENOUS
Status: COMPLETED
Start: 2018-10-10 | End: 2018-10-10

## 2018-10-10 RX ORDER — FAMOTIDINE 10 MG/ML
20 INJECTION INTRAVENOUS 2 TIMES DAILY
Status: DISCONTINUED | OUTPATIENT
Start: 2018-10-10 | End: 2018-10-12

## 2018-10-10 RX ORDER — LEVETIRACETAM 15 MG/ML
1500 INJECTION INTRAVASCULAR EVERY 12 HOURS
Status: DISCONTINUED | OUTPATIENT
Start: 2018-10-10 | End: 2018-10-10

## 2018-10-10 RX ORDER — METOPROLOL TARTRATE 1 MG/ML
5 INJECTION, SOLUTION INTRAVENOUS EVERY 6 HOURS
Status: DISCONTINUED | OUTPATIENT
Start: 2018-10-10 | End: 2018-10-11

## 2018-10-10 RX ORDER — LEVETIRACETAM 5 MG/ML
500 INJECTION INTRAVASCULAR ONCE
Status: COMPLETED | OUTPATIENT
Start: 2018-10-10 | End: 2018-10-10

## 2018-10-10 RX ORDER — LEVETIRACETAM 15 MG/ML
1500 INJECTION INTRAVASCULAR EVERY 12 HOURS
Status: DISCONTINUED | OUTPATIENT
Start: 2018-10-10 | End: 2018-10-13 | Stop reason: HOSPADM

## 2018-10-10 RX ADMIN — METOPROLOL TARTRATE 5 MG: 5 INJECTION, SOLUTION INTRAVENOUS at 05:10

## 2018-10-10 RX ADMIN — LEVETIRACETAM 1500 MG: 15 INJECTION INTRAVENOUS at 08:10

## 2018-10-10 RX ADMIN — CHLORHEXIDINE GLUCONATE 15 ML: 1.2 RINSE ORAL at 08:10

## 2018-10-10 RX ADMIN — SODIUM CHLORIDE: 0.9 INJECTION, SOLUTION INTRAVENOUS at 08:10

## 2018-10-10 RX ADMIN — LEVETIRACETAM INJECTION 1000 MG: 10 INJECTION INTRAVENOUS at 08:10

## 2018-10-10 RX ADMIN — METOPROLOL TARTRATE 5 MG: 5 INJECTION, SOLUTION INTRAVENOUS at 12:10

## 2018-10-10 RX ADMIN — ATORVASTATIN CALCIUM 80 MG: 40 TABLET, FILM COATED ORAL at 08:10

## 2018-10-10 RX ADMIN — POTASSIUM CHLORIDE 40 MEQ: 400 INJECTION, SOLUTION INTRAVENOUS at 07:10

## 2018-10-10 RX ADMIN — METOPROLOL TARTRATE 25 MG: 25 TABLET ORAL at 08:10

## 2018-10-10 RX ADMIN — PROPOFOL 50 MCG/KG/MIN: 10 INJECTION, EMULSION INTRAVENOUS at 05:10

## 2018-10-10 RX ADMIN — FAMOTIDINE 20 MG: 10 INJECTION INTRAVENOUS at 08:10

## 2018-10-10 RX ADMIN — LEVETIRACETAM INJECTION 500 MG: 5 INJECTION INTRAVENOUS at 11:10

## 2018-10-10 RX ADMIN — PROPOFOL 20 MCG/KG/MIN: 10 INJECTION, EMULSION INTRAVENOUS at 05:10

## 2018-10-10 RX ADMIN — SODIUM CHLORIDE: 0.9 INJECTION, SOLUTION INTRAVENOUS at 10:10

## 2018-10-10 RX ADMIN — ASPIRIN 81 MG CHEWABLE TABLET 81 MG: 81 TABLET CHEWABLE at 08:10

## 2018-10-10 RX ADMIN — PIPERACILLIN SODIUM AND TAZOBACTAM SODIUM 4.5 G: 4; .5 INJECTION, POWDER, LYOPHILIZED, FOR SOLUTION INTRAVENOUS at 04:10

## 2018-10-10 RX ADMIN — ENOXAPARIN SODIUM 40 MG: 100 INJECTION SUBCUTANEOUS at 04:10

## 2018-10-10 RX ADMIN — PROPOFOL 50 MCG/KG/MIN: 10 INJECTION, EMULSION INTRAVENOUS at 01:10

## 2018-10-10 RX ADMIN — FAMOTIDINE 20 MG: 10 INJECTION, SOLUTION INTRAVENOUS at 08:10

## 2018-10-10 RX ADMIN — CLOPIDOGREL BISULFATE 75 MG: 75 TABLET ORAL at 08:10

## 2018-10-10 NOTE — PLAN OF CARE
Problem: Patient Care Overview  Goal: Plan of Care Review  Outcome: Ongoing (interventions implemented as appropriate)  Patient remains unresponsive on Vent.  Maxed on propofol drip.  Keppra administered as ordered.  Positive gag reflex.  Withdrawal from pain at times.  Remains SR on monitor. . Pt received a 500cc bolus once on shift for low BP. Art line no longer working.  Patient did vomit once.  Two liquid BMs noted.  Flexi seal inserted. Adequate UOP to torres.  Repositioning patient Q2H to prevent skin breakdown.  Bicarb drip infusing.  IV abx administered as ordered.  Afebrile on shift.

## 2018-10-10 NOTE — PLAN OF CARE
Problem: Patient Care Overview  Goal: Plan of Care Review  Outcome: Ongoing (interventions implemented as appropriate)  Recommendations    Recommendation/Intervention:   1. When medically able, resume TF:      -Peptamen Intense (Bariatric) @ 40 mL/hr. W/ current propofol infusion, provides 1567 kcal, 89 gm protein, 806 mL free H2O.       -Water flushes for tube patency (30 mL q4hrs) and for hydration per MD/NP.       -Check residuals q4hrs, hold TF if >500 cc's  2. If pt extubated and safe for all PO intake, recommend Cardiac diet.   3. Will continue to monitor, adjust recs PRN    Goals: Diet advancement or initiation of nutrition support by RD f/u  Nutrition Goal Status: progressing toward goal  Communication of RD Recs: discussed on rounds, POC review, communicated w/ NP Randy

## 2018-10-10 NOTE — PLAN OF CARE
Problem: Patient Care Overview  Goal: Plan of Care Review  Outcome: Ongoing (interventions implemented as appropriate)  Patient remained stable throughout the shift. EEG performed this morning on patient per neurology consult. Patient started on Keppra and all pressors discontinued.

## 2018-10-10 NOTE — PROGRESS NOTES
Notified EICU of Art line reading 81/52 with a MAP of 66.  BP cuff not matching 132/39 MAP of 62.  Will wait for further orders

## 2018-10-10 NOTE — PLAN OF CARE
Per MDR, the plan is to  De-escalate IVAB, increase keppra or add another agent for seizure activity.     10/10/18 8059   Discharge Reassessment   Assessment Type Discharge Planning Reassessment   Provided patient/caregiver education on the expected discharge date and the discharge plan No   Do you have any problems affording any of your prescribed medications? TBD   Discharge Plan A Other   Discharge Plan B Other   Patient choice form signed by patient/caregiver N/A   Can the patient answer the patient profile reliably? No, cognitively impaired   How does the patient rate their overall health at the present time? Poor   Describe the patient's ability to walk at the present time. Major restrictions/daily assistance from another person   Number of comorbid conditions (as recorded on the chart) Five or more   During the past month, has the patient often been bothered by feeling down, depressed or hopeless? No   During the past month, has the patient often been bothered by little interest or pleasure in doing things? No

## 2018-10-10 NOTE — PROGRESS NOTES
[facility] -   Adult Nutrition  Progress Note    SUMMARY     Recommendations    Recommendation/Intervention:   1. When medically able, resume TF:      -Peptamen Intense (Bariatric) @ 40 mL/hr. W/ current propofol infusion, provides 1567 kcal, 89 gm protein, 806 mL free H2O.       -Water flushes for tube patency (30 mL q4hrs) and for hydration per MD/NP.       -Check residuals q4hrs, hold TF if >500 cc's  2. If pt extubated and safe for all PO intake, recommend Cardiac diet.   3. Will continue to monitor, adjust recs PRN    Goals: Diet advancement or initiation of nutrition support by RD f/u  Nutrition Goal Status: progressing toward goal  Communication of RD Recs: discussed on rounds, POC review, communicated w/ NP Randy    Reason for Assessment    Reason for Assessment: RD f/u  Dx:  1. Cardiac arrest    2. SOB (shortness of breath)    3. Chest tube in place    4. Encounter for central line placement    5. Hypoxic encephalopathy    6. Acute hypoxemic respiratory failure    7. RYLIE (acute kidney injury)    8. Closed fracture of multiple ribs of right side, initial encounter    9. Coronary artery disease involving native coronary artery of native heart without angina pectoris    10. Essential hypertension    11. Hyperglycemia, unspecified    12. Hypothyroidism, unspecified type    13. Loculated right lateral pneumothorax    14. PAD (peripheral artery disease)    15. Metabolic acidosis    16. Anoxic brain injury    17. Anemia, unspecified type    18. Anoxic seizures      Past Medical History:   Diagnosis Date    Acute hypoxemic respiratory failure 10/7/2018    RYLIE (acute kidney injury) 10/7/2018    Allergic rhinitis, cause unspecified     Anemia, unspecified 10/9/2018    Anoxic seizures 10/9/2018    Arthritis     Benign colonic polyp     Breast cancer mid 1980s    right; per patient s/p right mastectomy and chemo, unsure about radiation    CHF (congestive heart failure)     Coronary artery  "disease     Coronary artery disease involving native coronary artery of native heart without angina pectoris 8/2/2017    Depression     Diverticular disease     External hemorrhoids     Gout attack     Hyperlipidemia     Hypertension     Hypothyroidism     Obesity (BMI 30-39.9) 10/24/2016    Osteoarthritis     Peripheral vascular disease     Postmenopausal     Pulmonary embolism     reported per patient; unsure date    ST elevation myocardial infarction (STEMI) of anterior wall 6/27/2017    Thrombocytopenia     Tinnitus aurium     Vitamin D deficiency disease        Interdisciplinary Rounds: attended  General Information Comments: Pt admitted to ICU, currently intubated and sedated w/ propofol. TTM started in ED, continues.     10.10.18- Pt tolerated TTM protocol well. Per ICU rounds, TF stopped d/t persistent vomiting. Propofol stopped. OGT to suction, brown bile output noted. Pending abdominal film.    Nutrition Discharge Planning: pending medical course    Nutrition Risk Screen    Nutrition Risk Screen: no indicators present    Nutrition/Diet History    Do you have any cultural, spiritual, Christianity conflicts, given your current situation?: JESSI  Food Allergies: NKFA    Anthropometrics    Temp: 98.3 °F (36.8 °C)  Height: 4' 11.84" (152 cm)  Height (inches): 59.84 in  Weight Method: Bed Scale  Weight: 76.8 kg (169 lb 5 oz)  Weight (lb): 169.31 lb  Ideal Body Weight (IBW), Female: 99.2 lb  % Ideal Body Weight, Female (lb): 170.68 lb  BMI (Calculated): 33.3  BMI Grade: 30 - 34.9- obesity - grade I       Lab/Procedures/Meds    Pertinent Labs Reviewed: reviewed  BMP  Lab Results   Component Value Date     10/10/2018    K 4.0 10/10/2018     10/10/2018    CO2 24 10/10/2018    BUN 16 10/10/2018    CREATININE 0.9 10/10/2018    CALCIUM 7.0 (L) 10/10/2018    ANIONGAP 8 10/10/2018    ESTGFRAFRICA >60 10/10/2018    EGFRNONAA >60 10/10/2018     Lab Results   Component Value Date    CALCIUM 7.0 (L) " 10/10/2018    PHOS 2.9 10/10/2018     Lab Results   Component Value Date    ALBUMIN 2.2 (L) 10/10/2018     Recent Labs   Lab  10/10/18   0518   POCTGLUCOSE  89     No results found for: LABA1C, HGBA1C    Pertinent Medications Reviewed: reviewed  Scheduled Meds:   aspirin  81 mg Oral Daily    atorvastatin  80 mg Oral Daily    chlorhexidine  15 mL Mouth/Throat BID    clopidogrel  75 mg Oral Daily    enoxaparin  40 mg Subcutaneous Daily    famotidine (PF)  20 mg Intravenous BID    levetiracetam IVPB  1,000 mg Intravenous Q12H    metoprolol tartrate  25 mg Per NG tube BID    polyethylene glycol  17 g Oral Daily     Continuous Infusions:   sodium chloride 0.9% 75 mL/hr at 10/10/18 0810    niCARdipine Stopped (10/07/18 1900)    propofol 50 mcg/kg/min (10/10/18 0600)     PRN Meds:.bisacodyl, dextrose 50%, glucagon (human recombinant), influenza, insulin aspart U-100, magnesium sulfate IVPB, magnesium sulfate IVPB, potassium chloride in water **AND** potassium chloride in water **AND** potassium chloride in water, sodium phosphate IVPB, sodium phosphate IVPB, sodium phosphate IVPB    Physical Findings/Assessment    Overall Physical Appearance: obese, on ventilator support  Tubes: orogastric tube  Oral/Mouth Cavity: tooth/teeth missing  Skin: (Eric=8)    Estimated/Assessed Needs    Weight Used For Calorie Calculations: 76.8 kg (169 lb 5 oz)  Energy Calorie Requirements (kcal): 1289  Energy Need Method: [place] (modified)  Protein Requirements: 90  Weight Used For Protein Calculations: 45 kg (99 lb 3.2 oz)(Using IBW x 2 - Obese in ICU)     Fluid Need Method: RDA Method(or per MD/NP)  RDA Method (mL): 1289  CHO Requirement: n/a      Nutrition Prescription Ordered    Current Diet Order: NPO    Evaluation of Received Nutrient/Fluid Intake      % Intake of Estimated Energy Needs: 0 - 25 %  % Meal Intake: NPO    Nutrition Risk    Level of Risk/Frequency of Follow-up: (f/u x2 weekly)     Assessment and  Plan    Nutrition Problem  Inadequate oral intake    Related to (etiology):   On mechanical ventilation    Signs and Symptoms (as evidenced by):   Current diet order (NPO)    Interventions/Recommendations (treatment strategy):  See above    Nutrition Diagnosis Status:   Continues         Monitor and Evaluation    Food and Nutrient Intake: energy intake, food and beverage intake  Food and Nutrient Adminstration: diet order  Anthropometric Measurements: weight  Biochemical Data, Medical Tests and Procedures: electrolyte and renal panel, gastrointestinal profile  Nutrition-Focused Physical Findings: overall appearance     Nutrition Follow-Up    RD Follow-up?: Yes

## 2018-10-10 NOTE — ASSESSMENT & PLAN NOTE
-Unclear etiology.  CPR done for approximately 30 min per nursing staff.  -Initial rhythm was bradycardic, received atropine and epinephrine with return of spontaneous circulation after two rounds of epi.  -s/p hypothermia protocol   - Neurology on board

## 2018-10-10 NOTE — NURSING
Pt SBP remains in the 170's after 5mg IVP metoprolol. Notified Np Randy. No new orders. Will cont to monitor.

## 2018-10-10 NOTE — SUBJECTIVE & OBJECTIVE
Interval History: Pt was seen and examiend at [facility] . She still requiring ventilation support .     Review of Systems   Unable to perform ROS: Intubated     Objective:     Vital Signs (Most Recent):  Temp: (!) 100.5 °F (38.1 °C) (10/10/18 1705)  Pulse: 93 (10/10/18 1738)  Resp: 19 (10/10/18 1738)  BP: (!) 167/43 (10/10/18 1247)  SpO2: 97 % (10/10/18 1738) Vital Signs (24h Range):  Temp:  [97.9 °F (36.6 °C)-100.5 °F (38.1 °C)] 100.5 °F (38.1 °C)  Pulse:  [73-93] 93  Resp:  [8-29] 19  SpO2:  [92 %-100 %] 97 %  BP: ()/() 167/43     Weight: 76.8 kg (169 lb 5 oz)  Body mass index is 33.24 kg/m².    Intake/Output Summary (Last 24 hours) at 10/10/2018 1745  Last data filed at 10/10/2018 1700  Gross per 24 hour   Intake 5294.5 ml   Output 2199 ml   Net 3095.5 ml      Physical Exam   Constitutional: She appears well-developed and well-nourished. She has a sickly appearance. She appears ill. No distress. She is sedated and intubated.   HENT:   Head: Normocephalic and atraumatic.   Mouth/Throat: Oropharynx is clear and moist.   Eyes: Conjunctivae and lids are normal. Pupils are equal, round, and reactive to light.   Neck: Neck supple. No JVD present. Carotid bruit is not present. No tracheal deviation present.       Cardiovascular: Normal rate and regular rhythm.   Pulses:       Radial pulses are 1+ on the right side, and 1+ on the left side.        Dorsalis pedis pulses are 1+ on the right side, and 1+ on the left side.   Pulmonary/Chest: She is intubated. No respiratory distress. She has wheezes. She has rales.           Abdominal: Soft. She exhibits no distension and no ascites. Bowel sounds are decreased.   Genitourinary:   Genitourinary Comments: Swain secured, patent, and draining   Musculoskeletal: She exhibits no edema or deformity.   Diffuse mild edema with more so Right hand edema noted   Lymphadenopathy:     She has no cervical adenopathy.   Neurological: She is unresponsive.   No posturing or spont  movements.  Weak carini reflex.  No gag or corneal reflex.  No withdrawal to pain.   Skin: Skin is warm, dry and intact. Capillary refill takes less than 2 seconds. No rash noted. She is not diaphoretic. No cyanosis.       Significant Labs: All pertinent labs within the past 24 hours have been reviewed.    Significant Imaging: I have reviewed all pertinent imaging results/findings within the past 24 hours.

## 2018-10-10 NOTE — PROGRESS NOTES
[facility] Hospital Medicine  Progress Note    Patient Name: Charline Messer  MRN: [id number]  Patient Class: IP- Inpatient   Admission Date: 10/7/2018  Length of Stay: 3 days  Attending Physician: Jose Eduardo Meek, *  Primary Care Provider: Primary Doctor No        Subjective:     Principal Problem:Anoxic brain injury    HPI:  Ms. Messer is a 83-year-old  female with PMH significant for HTN, CAD, was working at [employer] retirement, when she had a witnessed cardiac arrest at around midnight.  Patient was complaining of shortness of breath and collapsed.  Medical staff immediately started CPR, received two rounds of epinephrine, one round of atropine.  She was intubated on the scene and brought to the ED.  GCS upon arrival 3.  No corneal reflex, no gag reflex, not breathing over the vent.  Laboratory workup reveals lactic acid greater than 12, hypotensive with systolic in the 80s, initiated on Levophed drip.  Currently not on sedation except for IV Ativan as needed.  Chest x-ray reveals sternal fracture and multiple anterior rib fractures from the CPR earlier today.  CT head negative for ischemia or hemorrhage.  Patient had a pneumothorax, chest tube placed in the ED. Patient's  and son at the bedside hysterical.  Do not want to discuss any further resuscitative measures.  Patient remains full code at this time.  Patient to be initiated on hypothermia protocol.    Hospital Course:  82 y/o aaf admitted to icu  On mechanical ventilation   After cardiac arrest .  She was started on hypothermic  Protocol. Pt is s/p right side chest tube  2/2/ to  Traumatic pneumothorax . Patient with no cough, gag or corneal reflexes  .  There is no motor , sensory  Or painful response  . The TTE  Show  Normal EF , no WMA  And mild PHT . Pt was started on Cardene drip  Due to elevated bp    sbp > 200 . Pt  Became hypotensive and levophed was started to keep a map > 65 .  Pt was re- warm  .Now  she has new seizures onset .  The pt  Has  A corneal reflex and mild withdrawal to pain  . The EEG  Show seizures  Activity . And started on seizures medication .         Interval History: Pt was seen and examiend at [facility] . She still requiring ventilation support .     Review of Systems   Unable to perform ROS: Intubated     Objective:     Vital Signs (Most Recent):  Temp: (!) 100.5 °F (38.1 °C) (10/10/18 1705)  Pulse: 93 (10/10/18 1738)  Resp: 19 (10/10/18 1738)  BP: (!) 167/43 (10/10/18 1247)  SpO2: 97 % (10/10/18 1738) Vital Signs (24h Range):  Temp:  [97.9 °F (36.6 °C)-100.5 °F (38.1 °C)] 100.5 °F (38.1 °C)  Pulse:  [73-93] 93  Resp:  [8-29] 19  SpO2:  [92 %-100 %] 97 %  BP: ()/() 167/43     Weight: 76.8 kg (169 lb 5 oz)  Body mass index is 33.24 kg/m².    Intake/Output Summary (Last 24 hours) at 10/10/2018 1745  Last data filed at 10/10/2018 1700  Gross per 24 hour   Intake 5294.5 ml   Output 2199 ml   Net 3095.5 ml      Physical Exam   Constitutional: She appears well-developed and well-nourished. She has a sickly appearance. She appears ill. No distress. She is sedated and intubated.   HENT:   Head: Normocephalic and atraumatic.   Mouth/Throat: Oropharynx is clear and moist.   Eyes: Conjunctivae and lids are normal. Pupils are equal, round, and reactive to light.   Neck: Neck supple. No JVD present. Carotid bruit is not present. No tracheal deviation present.       Cardiovascular: Normal rate and regular rhythm.   Pulses:       Radial pulses are 1+ on the right side, and 1+ on the left side.        Dorsalis pedis pulses are 1+ on the right side, and 1+ on the left side.   Pulmonary/Chest: She is intubated. No respiratory distress. She has wheezes. She has rales.           Abdominal: Soft. She exhibits no distension and no ascites. Bowel sounds are decreased.   Genitourinary:   Genitourinary Comments: Swain secured, patent, and draining   Musculoskeletal: She exhibits no edema or deformity.    Diffuse mild edema with more so Right hand edema noted   Lymphadenopathy:     She has no cervical adenopathy.   Neurological: She is unresponsive.   No posturing or spont movements.  Weak carini reflex.  No gag or corneal reflex.  No withdrawal to pain.   Skin: Skin is warm, dry and intact. Capillary refill takes less than 2 seconds. No rash noted. She is not diaphoretic. No cyanosis.       Significant Labs: All pertinent labs within the past 24 hours have been reviewed.    Significant Imaging: I have reviewed all pertinent imaging results/findings within the past 24 hours.    Assessment/Plan:     Pt is unable to consent due to medical condition/altered mental status - okay for records to be released to .       * Anoxic brain injury    - s/p hypothermia protocol  -neurology on board   -cont supportive t x  Pt is unable to consent due to medical condition/altered mental status - okay for records to be released to .               Anemia and Thrombocytopenia    Unknown etiology   H/H trending down  .  Transfuse as needed           Anoxic seizures    Cont supportive  tx           Traumatic fracture of ribs with pneumothorax, right, closed, initial encounter    Chest tube per CT surgery           Lactic acidemia    Lactic acid greater than 12.  Secondary to cardiac arrest.  Repeat every 4 hr x2.  Continue IV vancomycin, IV Zosyn empirically.  Follow up on cultures.          Acute hypoxemic respiratory failure    2/2 to cardiopulmonary arrest   Cont ventilation support  . Wean  As tolerated         Cardiac arrest    -Unclear etiology.  CPR done for approximately 30 min per nursing staff.  -Initial rhythm was bradycardic, received atropine and epinephrine with return of spontaneous circulation after two rounds of epi.  -s/p hypothermia protocol   - Neurology on board               Coronary artery disease involving native coronary artery of native heart without angina pectoris    Cont BB , asa , plavix  and  statin   TTE show nl EF  And no WMA           PAD (peripheral artery disease)    Cont  Current  tx         Essential hypertension     Cont current tx             VTE Risk Mitigation (From admission, onward)        Ordered     enoxaparin injection 40 mg  Daily      10/09/18 0920     IP VTE HIGH RISK PATIENT  Once      10/07/18 0922     Place sequential compression device  Until discontinued      10/07/18 0590        Plan:  -Cont supportive tx  -S/p hypothermia protocol  -The son  revoke the DNR . Pt now is a full  code  -Document that or is unable to consent due to medical condition  Therefore , okay for records to be released to  .   - cont prophylactic IVAB . The Blood cx remain ngtd     -Condition:  Critical   -Prognosis: Poor   -Code Status: Full code    -Disposition plan:   Pending acute illness resolve    -Consultant:  Pulmonology , Neurology  And CT surgery   -Nutrition:    -PT/OT:  ----  -Antibiotic :   Zosyn   -Family :  Case was discussed in detail with family .   -DVT Prophylaxis :  lovenox    -Out patient Follow up :  -----    -Out Patient  Test :------     -New medication /medication changes:   -----  -Pertinent Lab/Cultures : -----  -Pertinent test:  ----  -Surgery /Procedure : s/p right side chest tube           Critical care time spent on the evaluation and treatment of severe organ dysfunction, review of pertinent labs and imaging studies, discussions with consulting providers and discussions with patient/family: 35 minutes.    Jose Eduardo Meek MD  Department of Hospital Medicine   [facility]

## 2018-10-10 NOTE — PROGRESS NOTES
Progress Note  Neurological ICU    Admit Date: 10/7/2018   LOS: 3 days     SUBJECTIVE:     Follow-up For: neurological evaluation s/p post cardiac arrest.  HISTORY OF PRESENT ILLNESS:  An 83-year-old lady status post cardiac arrest, now   intubated.  She did show some seizure activity, now on Keppra and propofol.  In   the morning, the nurse practitioner tried to taper propofol  off ,   she did show some twitching, so propofol has been increased to 20 mcg per minute   and Keppra has been tapered up to 1500 mg twice a day.  She is in comatose.    She is not responding to pain.  Willard coma score is 3.  Pupil is pinpoint.    Neuro exam is compromised because of the sedation effect, intubations and also   seizure activity.  Family is at the bedside, friend , sister, and also .     is emotional, crying and .  Neurologically, condition is quite   unchanged.  .      Continuous Infusions:   sodium chloride 0.9% 75 mL/hr at 10/10/18 0810    niCARdipine Stopped (10/07/18 1900)    propofol 20 mcg/kg/min (10/10/18 1115)     Scheduled Meds:   aspirin  81 mg Oral Daily    atorvastatin  80 mg Oral Daily    chlorhexidine  15 mL Mouth/Throat BID    clopidogrel  75 mg Oral Daily    enoxaparin  40 mg Subcutaneous Daily    famotidine (PF)  20 mg Intravenous BID    levetiracetam IVPB  1,500 mg Intravenous Q12H    metoprolol  5 mg Intravenous Q6H    polyethylene glycol  17 g Oral Daily     PRN Meds:bisacodyl, influenza, insulin aspart U-100, magnesium sulfate IVPB, magnesium sulfate IVPB, potassium chloride in water **AND** potassium chloride in water **AND** potassium chloride in water, sodium phosphate IVPB, sodium phosphate IVPB, sodium phosphate IVPB    Review of patient's allergies indicates:   Allergen Reactions    Codeine Nausea And Vomiting    Ibuprofen Hives and Itching           Zetia [ezetimibe] Nausea And Vomiting       OBJECTIVE:     Vital Signs (Most Recent)  Temp: 98.3 °F (36.8 °C) (10/10/18  0705)  Pulse: 88 (10/10/18 1319)  Resp: (!) 21 (10/10/18 1319)  BP: (!) 138/91 (10/10/18 0630)  SpO2: 98 % (10/10/18 1319)    Vital Signs Range (Last 24H):  Temp:  [97.9 °F (36.6 °C)-98.6 °F (37 °C)]   Pulse:  []   Resp:  [8-29]   BP: ()/()   SpO2:  [92 %-100 %]     I & O (Last 24H):    Intake/Output Summary (Last 24 hours) at 10/10/2018 1601  Last data filed at 10/10/2018 1500  Gross per 24 hour   Intake 4592 ml   Output 2001 ml   Net 2591 ml     Ventilator Data (Last 24H):     Vent Mode: A/C  Oxygen Concentration (%):  [35] 35  Resp Rate Total:  [18 br/min-26 br/min] 18 br/min  Vt Set:  [0 mL] 0 mL  PEEP/CPAP:  [5 cmH20] 5 cmH20  Pressure Support:  [0 cmH20] 0 cmH20  Mean Airway Pressure:  [9.6 xzV08-87 cmH20] 9.7 cmH20    Hemodynamic Parameters (Last 24H):       Physical Exam:  She is intubated , Propofol drip is running @20 mcg/min     Chardon Coma Scale (GCS)    Score   EYE OPENING   Spontaneous 4   Response to verbal command 3   Response to pain 2   No eye opening 1               VERBAL response   Oriented 5   Confused 4   Inappropriate words 3   Incomprehensible sounds 2   No verbal response 1         MOTOR response   Obeys commands 6   Localizing response to pain 5   Withdrawal response to pain 4   Flexion to pain 3   Extension to pain 2   No motor response 1   Total  3     The GCS is scored between 3 and 15, 3 being the worst and 15 the best. It is composed of three parameters: best eye response (E), best verbal response (V), and best motor response (M). The components of the GCS should be recorded individually; for example, [id number] results in a GCS score of 9. A score of 13 or higher correlates with mild brain injury, a score of 9 to 12 correlates with moderate injury, and a score of 8 or less represents severe brain injury.      Lines/Drains:       Percutaneous Central Line Insertion/Assessment - triple lumen  10/07/18 1105 left internal jugular (Active)   Dressing biopatch in  place;dressing dry and intact 10/10/2018  3:05 PM   Securement secured w/ sutures 10/10/2018  3:05 PM   Additional Site Signs no drainage 10/10/2018  3:05 PM   Distal Patency/Care blood return present 10/10/2018  3:05 PM   Medial Patency/Care flushed w/o difficulty;blood return present;normal saline locked 10/10/2018  3:05 PM   Proximal Patency/Care flushed w/o difficulty;blood return present;infusing 10/10/2018  3:05 PM   Dressing Change Due 10/14/18 10/10/2018  3:03 AM   Daily Line Review Performed 10/10/2018  3:05 PM   Number of days: 3            Peripheral IV - Double Lumen 10/07/18 0157 Right Antecubital (Active)   Site Assessment Clean;Dry;Intact;No swelling;No redness 10/10/2018  3:05 PM   Line 1 Status Flushed;Saline locked 10/10/2018  3:05 PM   Line 2 Status Blood return noted;Saline locked;Flushed 10/10/2018  3:05 PM   Dressing Status Clean;Dry;Intact 10/10/2018  3:05 PM   Dressing Intervention Dressing changed 10/10/2018  3:05 PM   Dressing Change Due 10/14/18 10/10/2018  3:05 PM   Site Change Due 10/10/18 10/7/2018  9:30 AM   Reason Not Rotated Not due 10/9/2018  5:05 PM   Number of days: 3            Chest Tube 10/07/18 1238 1 Right Fourth intercostal space 24 Fr. (Active)   Chest Tube WDL WDL 10/10/2018  7:05 AM   Function -20 cm H2O 10/10/2018  3:05 PM   Air Leak/Fluctuation air leak not present;connections tightened;dependent drainage cleared 10/10/2018  3:05 PM   Safety all tubing connections taped;2 rubber-tipped hemostats w/ patient;all connections secured;suction checked 10/10/2018  3:05 PM   Securement tubing anchored to body distal to insertion site w/ tape 10/10/2018  3:05 PM   Patency Intervention Tip/tilt 10/10/2018  3:05 PM   Drainage Description Serosanguineous 10/10/2018  3:05 PM   Dressing Appearance occlusive gauze dressing intact;clean and dry 10/10/2018  3:05 PM   Dressing Care dressing reinforced 10/10/2018  3:05 PM   Left Subcutaneous Emphysema none present 10/10/2018  3:05 PM    Right Subcutaneous Emphysema none present 10/10/2018  3:05 PM   Site Assessment Clean;Dry;No redness;Intact;No swelling 10/10/2018  3:05 PM   Surrounding Skin Dry;Intact 10/10/2018  3:05 PM   Output (mL) 12 mL 10/10/2018  3:00 AM   Number of days: 3            NG/OG Tube 10/07/18 0230 orogastric 16 Fr. Right mouth (Active)   Placement Check placement verified by distal tube length measurement;placement verified by aspirate characteristics 10/10/2018  3:05 PM   pH Aspirate Result 5 10/10/2018  3:05 PM   Tube advanced (cm) 53 10/7/2018  9:30 AM   Distal Tube Length (cm) 60 10/10/2018  3:05 PM   Tolerance no signs/symptoms of discomfort 10/10/2018  3:05 PM   Securement taped to commercial device 10/10/2018  3:05 PM   Clamp Status/Tolerance unclamped 10/10/2018  3:05 PM   Suction Setting/Drainage Method low;suction at;intermittent setting 10/10/2018  3:03 AM   Insertion Site Appearance no redness, warmth, tenderness, skin breakdown, drainage 10/10/2018  3:05 PM   Drainage Clear;Dilute;Green 10/10/2018  3:05 PM   Flush/Irrigation flushed w/;water 10/10/2018  3:05 PM   Feeding Method continuous 10/9/2018  5:05 PM   Feeding Action feeding held 10/9/2018  5:05 PM   Current Rate (mL/hr) 10 mL/hr 10/9/2018  5:05 PM   Goal Rate (mL/hr) 40 mL/hr 10/9/2018  5:05 PM   Intake (mL) 40 mL 10/10/2018  7:05 AM   Tube Output(mL)(Include Discarded Residual) 25 mL 10/7/2018  8:00 AM   Residual Amount (ml) 100 ml 10/7/2018  7:05 PM   Number of days: 3            Rectal Tube 10/10/18 0302 fecal management system (Active)   Balloon Inflation Volume (mL) 35 10/10/2018  3:05 PM   Reposition drainage bags for BMS & Swain on opposite sides of bed;balloon deflated 10/10/2018  3:05 PM   Outcome comfort enhanced 10/10/2018  3:05 PM   Stool Color Brown 10/10/2018  3:05 PM   Insertion Site Appearance no redness, warmth, tenderness, skin breakdown, drainage 10/10/2018  3:05 PM   Flush/Irrigation flushed w/;water 10/10/2018  3:05 PM   Number of  "days: 0            Urethral Catheter 10/07/18 1200 Temperature probe 16 Fr. (Active)   Site Assessment Clean;Intact 10/10/2018  3:05 PM   Collection Container Urimeter 10/10/2018  3:05 PM   Securement Method secured to top of thigh w/ adhesive device 10/10/2018  3:05 PM   Catheter Care Performed yes 10/10/2018  3:05 PM   Reason for Continuing Urinary Catheterization Critically ill in ICU requiring intensive monitoring 10/10/2018  3:05 PM   CAUTI Prevention Bundle StatLock in place w 1" slack;Intact seal between catheter & drainage tubing;Drainage bag off the floor;Green sheeting clip in use;No dependent loops or kinks;Drainage bag not overfilled (<2/3 full);Drainage bag below bladder 10/10/2018  7:05 AM   Output (mL) 95 mL 10/10/2018  3:00 PM   Number of days: 3       Laboratory (Last 24H):  CBC:  Recent Labs   Lab  10/10/18   0400   WBC  10.12   HGB  7.9*   HCT  25.3*   PLT  97*     CMP:  Recent Labs   Lab  10/10/18   0400   CALCIUM  7.0*   ALBUMIN  2.2*   PROT  5.0*   NA  140   K  4.0   CO2  24   CL  108   BUN  16   CREATININE  0.9   ALKPHOS  83   ALT  36   AST  83*   BILITOT  0.4       ASSESSMENT/PLAN:     1. Cardiac arrest s/p resuscitation , she is seizing with decreasing  Propofol drip . Keppra has been tappered up 1500 mg bid. Neurological examination is compromised with ongoing seizure . For better prognostic assessment, we need to bring seizure under control. Advised to slowly d/c propofol in am . If seizure, comes back, will start Depakote 500 mg IVpB twice a day with Keppra same dose.     Family talk: discussed with family that possibility is that she might got significant hypoxic injury to the brain. Spontaneous positive  Response is signs of good recovery and Time will say that. Now with seizure ongoing, difficult to neuro assessment. Family was informed and they are assured that we will communicate any decision with them.    Preventive Measures: will follow.        Plan:      Counseling/Consultation:I " discussed the case with Dr. Kitchen and nurses..    Critical Care Time greater than: 45 Minutes

## 2018-10-10 NOTE — SUBJECTIVE & OBJECTIVE
Review of Systems   Unable to perform ROS: Intubated       Objective:     Vital Signs (Most Recent):  Temp: 98.3 °F (36.8 °C) (10/10/18 0705)  Pulse: 86 (10/10/18 1058)  Resp: (!) 21 (10/10/18 1058)  BP: (!) 138/91 (10/10/18 0630)  SpO2: 98 % (10/10/18 1058) Vital Signs (24h Range):  Temp:  [97.9 °F (36.6 °C)-98.6 °F (37 °C)] 98.3 °F (36.8 °C)  Pulse:  [] 86  Resp:  [8-29] 21  SpO2:  [89 %-100 %] 98 %  BP: ()/() 138/91     Weight: 76.8 kg (169 lb 5 oz)  Body mass index is 33.24 kg/m².      Intake/Output Summary (Last 24 hours) at 10/10/2018 1100  Last data filed at 10/10/2018 0600  Gross per 24 hour   Intake 4552 ml   Output 1487 ml   Net 3065 ml       Physical Exam   Constitutional: She appears well-developed and well-nourished. She has a sickly appearance. She appears ill. No distress. She is sedated and intubated.   HENT:   Head: Normocephalic and atraumatic.   Mouth/Throat: Oropharynx is clear and moist.   Eyes: Conjunctivae and lids are normal. Pupils are equal, round, and reactive to light.   Neck: Neck supple. No JVD present. Carotid bruit is not present. No tracheal deviation present.       Cardiovascular: Normal rate and regular rhythm.   Pulses:       Radial pulses are 1+ on the right side, and 1+ on the left side.        Dorsalis pedis pulses are 1+ on the right side, and 1+ on the left side.   Pulmonary/Chest: She is intubated. No respiratory distress. She has wheezes. She has rales.           Abdominal: Soft. She exhibits no distension and no ascites. Bowel sounds are decreased.   Genitourinary:   Genitourinary Comments: Swain secured, patent, and draining   Musculoskeletal: She exhibits no edema or deformity.   Diffuse mild edema with more so Right hand edema noted   Lymphadenopathy:     She has no cervical adenopathy.   Neurological: She is unresponsive.   No posturing or spont movements.  Weak carini reflex.  No gag or corneal reflex.  No withdrawal to pain.   Skin: Skin is warm,  dry and intact. Capillary refill takes less than 2 seconds. No rash noted. She is not diaphoretic. No cyanosis.       Vents:  Vent Mode: A/C (10/10/18 1058)  Set Rate: 18 bmp (10/10/18 1058)  Vt Set: 0 mL (10/10/18 1058)  Pressure Support: 0 cmH20 (10/10/18 1058)  PEEP/CPAP: 5 cmH20 (10/10/18 1058)  Oxygen Concentration (%): 35 (10/10/18 1058)  Peak Airway Pressure: 28 cmH2O (10/10/18 1058)  Plateau Pressure: 0 cmH20 (10/10/18 1058)  Total Ve: 7.35 mL (10/10/18 1058)  F/VT Ratio<105 (RSBI): (!) (P) 63.44 (10/10/18 1058)    Lines/Drains/Airways     Central Venous Catheter Line                 Percutaneous Central Line Insertion/Assessment - triple lumen  10/07/18 1105 left internal jugular 2 days          Drain                 NG/OG Tube 10/07/18 0230 orogastric 16 Fr. Right mouth 3 days         Chest Tube 10/07/18 1238 1 Right Fourth intercostal space 24 Fr. 2 days         Urethral Catheter 10/07/18 1200 Temperature probe 16 Fr. 2 days         Rectal Tube 10/10/18 0302 fecal management system less than 1 day          Airway                 Airway - Non-Surgical 10/07/18 0145 Endotracheal Tube 3 days          Peripheral Intravenous Line                 Peripheral IV - Double Lumen 10/07/18 0157 Right Antecubital 3 days                Significant Labs:    CBC/Anemia Profile:  Recent Labs   Lab  10/09/18   0415  10/09/18   1553  10/10/18   0400   WBC  13.12*  12.55  10.12   HGB  8.6*  8.2*  7.9*   HCT  26.1*  25.7*  25.3*   PLT  93*  104*  97*   MCV  85  86  88   RDW  14.4  14.9*  15.2*        Chemistries:  Recent Labs   Lab  10/09/18   0750  10/09/18   1242  10/09/18   1553  10/10/18   0400   NA  135*  136  137  140   K  4.7  4.3  4.3  4.0   CL  107  107  107  108   CO2  18*  20*  19*  24   BUN  17  17  17  16   CREATININE  0.9  0.9  1.0  0.9   CALCIUM  7.0*  7.0*  7.0*  7.0*   ALBUMIN   --    --   2.3*  2.2*   PROT   --    --   5.0*  5.0*   BILITOT   --    --   0.4  0.4   ALKPHOS   --    --   82  83   ALT   --    --    41  36   AST   --    --   78*  83*   MG  1.8  2.6   --   2.2   PHOS  3.1  3.0   --   2.9       ABGs:   Recent Labs   Lab  10/10/18   0406   PH  7.396   PCO2  41.6   HCO3  25.5   POCSATURATED  98   BE  1     Coagulation:   Recent Labs   Lab  10/10/18   0400   INR  1.0   APTT  30.6     All pertinent labs within the past 24 hours have been reviewed.    Significant Imaging:  CXR: I have reviewed all pertinent results/findings within the past 24 hours and my personal findings are:  no change from prior film

## 2018-10-10 NOTE — EICU
BP79/52 on arterial line,MA 74    Received oral metoprool 12.5 mg 3 hours ago-mostlikely related to this  On propofol 20 mcg/kg/min(off nicardipine)  Plan:   ml once  Reduce dose of metoprolol 6.25 mg q 12 hours  (do administer parameters for SBP less than 100)

## 2018-10-10 NOTE — ASSESSMENT & PLAN NOTE
Continue to control with Propofol  infusion  If able to wean off Propofol then will likely need Cardene infusion till tolerating OG meds

## 2018-10-10 NOTE — ASSESSMENT & PLAN NOTE
TTM completed early AM 10/9  Neuro following  Seizures still suspected off Propofol with Kera  ICU neuro monitoring  Supportive care  Propofol for underlying seizures  EEG revealing seizure activity on 10/9  CT head unremarkable in ED for acute process  Poor prognosis suspected for meaningful neuro recovery

## 2018-10-11 LAB
ALBUMIN SERPL BCP-MCNC: 1.9 G/DL
ALBUMIN SERPL BCP-MCNC: 1.9 G/DL
ALLENS TEST: ABNORMAL
ALP SERPL-CCNC: 74 U/L
ALP SERPL-CCNC: 77 U/L
ALT SERPL W/O P-5'-P-CCNC: 34 U/L
ALT SERPL W/O P-5'-P-CCNC: 37 U/L
ANION GAP SERPL CALC-SCNC: 6 MMOL/L
ANION GAP SERPL CALC-SCNC: 7 MMOL/L
APTT BLDCRRT: 31.4 SEC
AST SERPL-CCNC: 79 U/L
AST SERPL-CCNC: 90 U/L
BASOPHILS # BLD AUTO: 0.02 K/UL
BASOPHILS # BLD AUTO: 0.02 K/UL
BASOPHILS NFR BLD: 0.3 %
BASOPHILS NFR BLD: 0.4 %
BILIRUB SERPL-MCNC: 0.5 MG/DL
BILIRUB SERPL-MCNC: 0.6 MG/DL
BUN SERPL-MCNC: 13 MG/DL
BUN SERPL-MCNC: 14 MG/DL
CALCIUM SERPL-MCNC: 7.4 MG/DL
CALCIUM SERPL-MCNC: 8.1 MG/DL
CHLORIDE SERPL-SCNC: 110 MMOL/L
CHLORIDE SERPL-SCNC: 111 MMOL/L
CO2 SERPL-SCNC: 21 MMOL/L
CO2 SERPL-SCNC: 22 MMOL/L
CREAT SERPL-MCNC: 0.8 MG/DL
CREAT SERPL-MCNC: 0.8 MG/DL
DELSYS: ABNORMAL
DIFFERENTIAL METHOD: ABNORMAL
DIFFERENTIAL METHOD: ABNORMAL
EOSINOPHIL # BLD AUTO: 0.4 K/UL
EOSINOPHIL # BLD AUTO: 0.5 K/UL
EOSINOPHIL NFR BLD: 5.6 %
EOSINOPHIL NFR BLD: 8.6 %
ERYTHROCYTE [DISTWIDTH] IN BLOOD BY AUTOMATED COUNT: 14.7 %
ERYTHROCYTE [DISTWIDTH] IN BLOOD BY AUTOMATED COUNT: 15 %
ERYTHROCYTE [SEDIMENTATION RATE] IN BLOOD BY WESTERGREN METHOD: 18 MM/H
EST. GFR  (AFRICAN AMERICAN): >60 ML/MIN/1.73 M^2
EST. GFR  (AFRICAN AMERICAN): >60 ML/MIN/1.73 M^2
EST. GFR  (NON AFRICAN AMERICAN): >60 ML/MIN/1.73 M^2
EST. GFR  (NON AFRICAN AMERICAN): >60 ML/MIN/1.73 M^2
FIO2: 35
GLUCOSE SERPL-MCNC: 79 MG/DL
GLUCOSE SERPL-MCNC: 95 MG/DL
HCO3 UR-SCNC: 22.2 MMOL/L (ref 24–28)
HCT VFR BLD AUTO: 22.6 %
HCT VFR BLD AUTO: 22.9 %
HGB BLD-MCNC: 7.3 G/DL
HGB BLD-MCNC: 7.4 G/DL
INR PPP: 1
IP: 23
IT: 0.74
LYMPHOCYTES # BLD AUTO: 1.2 K/UL
LYMPHOCYTES # BLD AUTO: 1.3 K/UL
LYMPHOCYTES NFR BLD: 16.9 %
LYMPHOCYTES NFR BLD: 21.6 %
MAGNESIUM SERPL-MCNC: 1.7 MG/DL
MCH RBC QN AUTO: 28.1 PG
MCH RBC QN AUTO: 28.2 PG
MCHC RBC AUTO-ENTMCNC: 32.3 G/DL
MCHC RBC AUTO-ENTMCNC: 32.3 G/DL
MCV RBC AUTO: 87 FL
MCV RBC AUTO: 87 FL
MODE: ABNORMAL
MONOCYTES # BLD AUTO: 0.4 K/UL
MONOCYTES # BLD AUTO: 0.4 K/UL
MONOCYTES NFR BLD: 5.5 %
MONOCYTES NFR BLD: 6.4 %
NEUTROPHILS # BLD AUTO: 3.5 K/UL
NEUTROPHILS # BLD AUTO: 5.5 K/UL
NEUTROPHILS NFR BLD: 63 %
NEUTROPHILS NFR BLD: 71.7 %
PCO2 BLDA: 35 MMHG (ref 35–45)
PEEP: 5
PH SMN: 7.41 [PH] (ref 7.35–7.45)
PHOSPHATE SERPL-MCNC: 2.4 MG/DL
PLATELET # BLD AUTO: 86 K/UL
PLATELET # BLD AUTO: 89 K/UL
PMV BLD AUTO: 11.4 FL
PMV BLD AUTO: 13.1 FL
PO2 BLDA: 100 MMHG (ref 80–100)
POC BE: -2 MMOL/L
POC SATURATED O2: 98 % (ref 95–100)
POCT GLUCOSE: 102 MG/DL (ref 70–110)
POCT GLUCOSE: 103 MG/DL (ref 70–110)
POCT GLUCOSE: 118 MG/DL (ref 70–110)
POCT GLUCOSE: 123 MG/DL (ref 70–110)
POCT GLUCOSE: 53 MG/DL (ref 70–110)
POCT GLUCOSE: 82 MG/DL (ref 70–110)
POTASSIUM SERPL-SCNC: 3.7 MMOL/L
POTASSIUM SERPL-SCNC: 3.7 MMOL/L
PROT SERPL-MCNC: 4.6 G/DL
PROT SERPL-MCNC: 4.6 G/DL
PROTHROMBIN TIME: 10.7 SEC
RBC # BLD AUTO: 2.6 M/UL
RBC # BLD AUTO: 2.62 M/UL
SAMPLE: ABNORMAL
SITE: ABNORMAL
SODIUM SERPL-SCNC: 138 MMOL/L
SODIUM SERPL-SCNC: 139 MMOL/L
VALPROATE SERPL-MCNC: 13.7 UG/ML
WBC # BLD AUTO: 5.59 K/UL
WBC # BLD AUTO: 7.69 K/UL

## 2018-10-11 PROCEDURE — S0028 INJECTION, FAMOTIDINE, 20 MG: HCPCS | Performed by: NURSE PRACTITIONER

## 2018-10-11 PROCEDURE — 83735 ASSAY OF MAGNESIUM: CPT

## 2018-10-11 PROCEDURE — 99900035 HC TECH TIME PER 15 MIN (STAT)

## 2018-10-11 PROCEDURE — 85730 THROMBOPLASTIN TIME PARTIAL: CPT

## 2018-10-11 PROCEDURE — 82803 BLOOD GASES ANY COMBINATION: CPT

## 2018-10-11 PROCEDURE — 80053 COMPREHEN METABOLIC PANEL: CPT

## 2018-10-11 PROCEDURE — 80164 ASSAY DIPROPYLACETIC ACD TOT: CPT

## 2018-10-11 PROCEDURE — 94003 VENT MGMT INPAT SUBQ DAY: CPT

## 2018-10-11 PROCEDURE — 85025 COMPLETE CBC W/AUTO DIFF WBC: CPT | Mod: 91

## 2018-10-11 PROCEDURE — 25000003 PHARM REV CODE 250: Performed by: STUDENT IN AN ORGANIZED HEALTH CARE EDUCATION/TRAINING PROGRAM

## 2018-10-11 PROCEDURE — 25000003 PHARM REV CODE 250: Performed by: INTERNAL MEDICINE

## 2018-10-11 PROCEDURE — 85610 PROTHROMBIN TIME: CPT

## 2018-10-11 PROCEDURE — 25000003 PHARM REV CODE 250: Performed by: NURSE PRACTITIONER

## 2018-10-11 PROCEDURE — 99233 SBSQ HOSP IP/OBS HIGH 50: CPT | Mod: ,,, | Performed by: PSYCHIATRY & NEUROLOGY

## 2018-10-11 PROCEDURE — 99291 CRITICAL CARE FIRST HOUR: CPT | Mod: ,,, | Performed by: NURSE PRACTITIONER

## 2018-10-11 PROCEDURE — 63600175 PHARM REV CODE 636 W HCPCS: Performed by: NURSE PRACTITIONER

## 2018-10-11 PROCEDURE — 63600175 PHARM REV CODE 636 W HCPCS: Performed by: INTERNAL MEDICINE

## 2018-10-11 PROCEDURE — 84100 ASSAY OF PHOSPHORUS: CPT

## 2018-10-11 PROCEDURE — 99900026 HC AIRWAY MAINTENANCE (STAT)

## 2018-10-11 PROCEDURE — 20000000 HC ICU ROOM

## 2018-10-11 PROCEDURE — 36600 WITHDRAWAL OF ARTERIAL BLOOD: CPT

## 2018-10-11 RX ORDER — GLUCAGON 1 MG
1 KIT INJECTION
Status: DISCONTINUED | OUTPATIENT
Start: 2018-10-11 | End: 2018-10-13 | Stop reason: HOSPADM

## 2018-10-11 RX ORDER — HYDRALAZINE HYDROCHLORIDE 20 MG/ML
10 INJECTION INTRAMUSCULAR; INTRAVENOUS EVERY 6 HOURS PRN
Status: DISCONTINUED | OUTPATIENT
Start: 2018-10-11 | End: 2018-10-13 | Stop reason: HOSPADM

## 2018-10-11 RX ORDER — IBUPROFEN 200 MG
16 TABLET ORAL
Status: DISCONTINUED | OUTPATIENT
Start: 2018-10-11 | End: 2018-10-13 | Stop reason: HOSPADM

## 2018-10-11 RX ORDER — FUROSEMIDE 10 MG/ML
20 INJECTION INTRAMUSCULAR; INTRAVENOUS ONCE
Status: COMPLETED | OUTPATIENT
Start: 2018-10-11 | End: 2018-10-11

## 2018-10-11 RX ORDER — HYOSCYAMINE SULFATE 0.12 MG/1
0.12 TABLET SUBLINGUAL 3 TIMES DAILY PRN
Status: DISCONTINUED | OUTPATIENT
Start: 2018-10-11 | End: 2018-10-13 | Stop reason: HOSPADM

## 2018-10-11 RX ORDER — IBUPROFEN 200 MG
24 TABLET ORAL
Status: DISCONTINUED | OUTPATIENT
Start: 2018-10-11 | End: 2018-10-13 | Stop reason: HOSPADM

## 2018-10-11 RX ADMIN — FAMOTIDINE 20 MG: 10 INJECTION INTRAVENOUS at 08:10

## 2018-10-11 RX ADMIN — CHLORHEXIDINE GLUCONATE 15 ML: 1.2 RINSE ORAL at 08:10

## 2018-10-11 RX ADMIN — HYOSCYAMINE SULFATE 0.12 MG: 0.12 TABLET ORAL; SUBLINGUAL at 07:10

## 2018-10-11 RX ADMIN — CLOPIDOGREL BISULFATE 75 MG: 75 TABLET ORAL at 08:10

## 2018-10-11 RX ADMIN — ATORVASTATIN CALCIUM 80 MG: 40 TABLET, FILM COATED ORAL at 08:10

## 2018-10-11 RX ADMIN — METOPROLOL TARTRATE 5 MG: 5 INJECTION, SOLUTION INTRAVENOUS at 12:10

## 2018-10-11 RX ADMIN — POTASSIUM CHLORIDE 40 MEQ: 400 INJECTION, SOLUTION INTRAVENOUS at 07:10

## 2018-10-11 RX ADMIN — DEXTROSE 500 MG: 50 INJECTION, SOLUTION INTRAVENOUS at 10:10

## 2018-10-11 RX ADMIN — SODIUM PHOSPHATE, MONOBASIC, MONOHYDRATE 15 MMOL: 276; 142 INJECTION, SOLUTION INTRAVENOUS at 05:10

## 2018-10-11 RX ADMIN — LEVETIRACETAM 1500 MG: 15 INJECTION INTRAVENOUS at 08:10

## 2018-10-11 RX ADMIN — POTASSIUM CHLORIDE 40 MEQ: 400 INJECTION, SOLUTION INTRAVENOUS at 05:10

## 2018-10-11 RX ADMIN — PROPOFOL 20 MCG/KG/MIN: 10 INJECTION, EMULSION INTRAVENOUS at 05:10

## 2018-10-11 RX ADMIN — MAGNESIUM SULFATE HEPTAHYDRATE 2 G: 40 INJECTION, SOLUTION INTRAVENOUS at 05:10

## 2018-10-11 RX ADMIN — ASPIRIN 81 MG CHEWABLE TABLET 81 MG: 81 TABLET CHEWABLE at 08:10

## 2018-10-11 RX ADMIN — METOPROLOL TARTRATE 5 MG: 5 INJECTION, SOLUTION INTRAVENOUS at 05:10

## 2018-10-11 RX ADMIN — DEXTROSE MONOHYDRATE 12.5 G: 25 INJECTION, SOLUTION INTRAVENOUS at 12:10

## 2018-10-11 RX ADMIN — SODIUM CHLORIDE: 0.9 INJECTION, SOLUTION INTRAVENOUS at 01:10

## 2018-10-11 RX ADMIN — FUROSEMIDE 20 MG: 10 INJECTION, SOLUTION INTRAMUSCULAR; INTRAVENOUS at 11:10

## 2018-10-11 NOTE — PLAN OF CARE
Problem: Patient Care Overview  Goal: Plan of Care Review  Outcome: Ongoing (interventions implemented as appropriate)  Uneventful shift. Remains on vent. Minimal drainage from chest tube. Propofol at set rate of 20mcg. IV kepra administered as ordered.  No seizures on shift.  IV fluids continued.  IV abx administered as ordered. SR on monitor.  SBP ranged from 140-160s.  Scheduled IV Metoprolol administered as ordered.  Flexi seal in place.  Adequate UOP.  Patient on specialty bed.  Repositioning Q2H to prevent skin breakdown.  Family updated on POC

## 2018-10-11 NOTE — PROGRESS NOTES
[facility]  Critical Care Medicine  Progress Note    Patient Name: Charline Messer  MRN: [id number]  Admission Date: 10/7/2018  Hospital Length of Stay: 4 days  Code Status: Full Code  Attending Provider: Jose Eduardo Meek, *  Primary Care Provider: Primary Doctor No   Principal Problem: Anoxic brain injury    Subjective:     HPI:  Ms Messer is a 82 yo BF with a PMH of CAD, CHF, HTN, HLD, Hypothyroidism, PAD, OA and Right Breast CA.  Per EMS, patient was working at [employer] [facility] [employer] last night when coworkers noticed patient was having difficulty breathing and then collapsed. EMS reports when they arrived patient was bradycardic in 40s and then shortly after pulse was lost. EMS and FD initiated CPR and administered epi and regained pulse. EMS reports shortly after, pulse was lost and another dose of epi was administered and regained pulse. Patient was intubated on scene with 7.0 ET tube.  She was presented to [facility] ED about 0230 hr this AM w/ GCS 3, LA > 12, SBP in 80s and started on Levophed infusion once CL obtained.  CT head unremarkable and CT chest with multiple right rib fractures.  She had right tension PTX and chest tube placed.          Hospital/ICU Course:  10/7 - Admitted to ICU this AM unresponsive on no sedation with some decorticate posturing.  SBP 180s off Levophed infusion.  Intubated on [facility] ventilation.  External cooling TTM started in ED.    10/8 -Remains unresponsive with decorticate posturing on no sedation. Some neurological improvement. Withdraws legs from painful stimuli. Pupils are reactive. Remains intubated on [facility] vent. BP is labile requiring alternating levophed and cardene.TTM continues. Good urine output.   10/9 - Re-warmed overnight completing TTM.  Silent Seizures this AM with attempted weaning of Propofol and confirmed with bedside EEG.  Still on vent with BP more stable not requiring Levophed or Cardene infusions  10/10 - This AM still intubated on [facility]  vent support and unresponsive.  Eye twitching with holding of Propofol infusion this AM.  Emesis yesterday and last night.  SBP to 200 off Propofol infusion  10/11 - remains intubated on mechanical ventilation with low dose propofol sedation; SAT this am with subsequent gaze change and mild nystagmus concerning for continued seizure; required D50 for episode of hypoglycemia overnight    Review of Systems   Unable to perform ROS: Intubated       Objective:     Vital Signs (Most Recent):  Temp: 99.6 °F (37.6 °C) (10/11/18 0302)  Pulse: 62 (10/11/18 0848)  Resp: (!) 22 (10/11/18 0848)  BP: (!) 139/43 (10/11/18 0615)  SpO2: 100 % (10/11/18 0848) Vital Signs (24h Range):  Temp:  [98.6 °F (37 °C)-100.5 °F (38.1 °C)] 99.6 °F (37.6 °C)  Pulse:  [30-94] 62  Resp:  [16-35] 22  SpO2:  [94 %-100 %] 100 %  BP: (117-251)/() 139/43     Weight: 76.8 kg (169 lb 5 oz)  Body mass index is 33.24 kg/m².      Intake/Output Summary (Last 24 hours) at 10/11/2018 0856  Last data filed at 10/11/2018 0804  Gross per 24 hour   Intake 2757.75 ml   Output 1483 ml   Net 1274.75 ml       Physical Exam   Constitutional: She appears well-nourished. She has a sickly appearance. She appears ill. No distress. She is sedated and intubated.   HENT:   Head: Normocephalic.   Eyes: Conjunctivae and lids are normal. Pupils are equal, round, and reactive to light.   Neck: Neck supple. No JVD present. Carotid bruit is not present. No tracheal deviation present.       Cardiovascular: Normal rate and regular rhythm.   Pulses:       Radial pulses are 1+ on the right side, and 1+ on the left side.        Dorsalis pedis pulses are 1+ on the right side, and 1+ on the left side.   Pulmonary/Chest: She is intubated. No respiratory distress. She has no wheezes. She has rales.           Abdominal: Soft. She exhibits no distension. Bowel sounds are decreased.   Musculoskeletal: She exhibits no edema or deformity.   Diffuse mild edema   Lymphadenopathy:     She has  no cervical adenopathy.   Neurological: She is unresponsive. GCS eye subscore is 1. GCS verbal subscore is 1. GCS motor subscore is 1.   No posturing or spont movements.  No gag or corneal reflex.  No withdrawal to pain.   Skin: Skin is warm and dry. Capillary refill takes less than 2 seconds. She is not diaphoretic. No cyanosis.       Vents:  Vent Mode: A/C (10/11/18 0848)  Set Rate: 22 bmp (10/11/18 0848)  Vt Set: 0 mL (10/11/18 0848)  Pressure Support: 0 cmH20 (10/11/18 0848)  PEEP/CPAP: 5 cmH20 (10/11/18 0848)  Oxygen Concentration (%): 35 (10/11/18 0848)  Peak Airway Pressure: 29 cmH2O (10/11/18 0848)  Plateau Pressure: 0 cmH20 (10/11/18 0848)  Total Ve: 7.62 mL (10/11/18 0848)  F/VT Ratio<105 (RSBI): (!) 62.68 (10/11/18 0848)    Lines/Drains/Airways     Central Venous Catheter Line                 Percutaneous Central Line Insertion/Assessment - triple lumen  10/07/18 1105 left internal jugular 3 days          Drain                 NG/OG Tube 10/07/18 0230 orogastric 16 Fr. Right mouth 4 days         Chest Tube 10/07/18 1238 1 Right Fourth intercostal space 24 Fr. 3 days         Urethral Catheter 10/07/18 1200 Temperature probe 16 Fr. 3 days         Rectal Tube 10/10/18 0302 fecal management system 1 day          Airway                 Airway - Non-Surgical 10/07/18 0145 Endotracheal Tube 4 days          Peripheral Intravenous Line                 Peripheral IV - Double Lumen 10/07/18 0157 Right Antecubital 4 days                Significant Labs:    CBC/Anemia Profile:  Recent Labs   Lab  10/10/18   0400  10/10/18   1638  10/11/18   0402   WBC  10.12  9.17  7.69   HGB  7.9*  7.9*  7.4*   HCT  25.3*  24.6*  22.9*   PLT  97*  97*  86*   MCV  88  88  87   RDW  15.2*  15.1*  15.0*        Chemistries:  Recent Labs   Lab  10/09/18   1242   10/10/18   0400  10/10/18   1638  10/11/18   0402   NA  136   < >  140  139  138   K  4.3   < >  4.0  3.5  3.7   CL  107   < >  108  108  110   CO2  20*   < >  24  23  21*   BUN   17   < >  16  14  13   CREATININE  0.9   < >  0.9  0.8  0.8   CALCIUM  7.0*   < >  7.0*  7.1*  7.4*   ALBUMIN   --    < >  2.2*  2.1*  1.9*   PROT   --    < >  5.0*  4.9*  4.6*   BILITOT   --    < >  0.4  0.5  0.5   ALKPHOS   --    < >  83  82  74   ALT   --    < >  36  40  37   AST   --    < >  83*  95*  90*   MG  2.6   --   2.2   --   1.7   PHOS  3.0   --   2.9   --   2.4*    < > = values in this interval not displayed.       All pertinent labs within the past 24 hours have been reviewed.  AB  Recent Labs   Lab  10/11/18   0438   PH  7.410   PO2  100   PCO2  35.0   HCO3  22.2*   BE  -2         Significant Imaging:  I have reviewed all pertinent imaging results/findings within the past 24 hours.      Assessment/Plan:     Neuro   * Anoxic brain injury    TTM completed early AM 10/9  Neuro following  CT head unremarkable in ED for acute process  EEG revealing seizure activity on 10/9  Seizures still suspected off Propofol with Keppra, plan add depakote  ICU neuro monitoring  Supportive care  Poor prognosis suspected for meaningful neuro recovery        Anoxic seizures    Cont Propofol infusion and Keppra, add depakote  Neuro following        Pulmonary   Acute hypoxemic respiratory failure    Cont full vent support  Vent settings reviewed   VAP prophylaxis  Brief SAT this am reveal concern for ongoing seizures        Cardiac/Vascular   Cardiac arrest    ICU cardiac monitoring  Echo preserved systolic function with EF 55%  Cont supportive care        Coronary artery disease involving native coronary artery of native heart without angina pectoris    Continue home Plavix, statin, lopressor, and ASA  ICU cardiac monitoring  Echo EF 55%        PAD (peripheral artery disease)    Continue home Plavix, statin and ASA        Essential hypertension    Metoprolol q6  ICU hemodynamic monitoring        Oncology   Anemia and Thrombocytopenia    No active bleeding  Conservative transfusion protocol  Follow CBC  stop LMWH given  continued platelet decline        Endocrine   Hyperglycemia, unspecified    Hypoglycemic overnight, monitor q6h        Other   Traumatic fracture of ribs with pneumothorax, right, closed, initial encounter    Chest tube secured and cont suction  Daily CXR        Preventive Measures:   Nutrition: advance TF as tolerated  Stress Ulcer: Pepcid  DVT: SCDs  BB: metoprolol  Bowel Prophylaxis: holding with active diarrhea  Head of Bed/Reposition: Elevate HOB and turn Q1-2 hours    Mobility: ROM  SAT/SBT: daily  Vent Day:  5  OG Day: 5  Central Line Day: 5  Swain Day: 5  Code Status: Full    Counseling/Consultation: I have discussed the care of this patient in detail with nursing staff and Dr. Guillen. Status and plan of care discussed with team on multidisciplinary rounds.     Critical Care Time: 60 minutes  Critical secondary to post cardiopulmonary arrest with anoxic injury, coma, respiratory failure on mechanical ventilation   Critical care was time spent personally by me on the following activities: development of treatment plan with patient or surrogate and bedside caregivers, discussions with consultants, evaluation of patient's response to treatment, examination of patient, ordering and performing treatments and interventions, ordering and review of laboratory studies, ordering and review of radiographic studies, pulse oximetry, re-evaluation of patient's condition. This critical care time did not overlap with that of any other provider or involve time for any procedures.     KYLER Beyer-BC  Critical Care Medicine  [facility]

## 2018-10-11 NOTE — PLAN OF CARE
Problem: Patient Care Overview  Goal: Plan of Care Review  Plan of care reviewed w/ patient and family at bedside.  Kepra given 30 min prior to stopping propofol. SAT failed due to seizure activity. Will reassess tomorrow. If seizure activity resumes when propofol is stopped, restart sedation and add Depakote per [facility]. Potassium 3.5, replaced per protocol. NS 0.9% started at 75 & bicarb gtt d/c'd. CT output 38cc & atrium marked. flexi in place w/ adequate patency. Repositioned Q2H, heels floated. Copious oral secretions noted. Patient stable, will cont to monitor

## 2018-10-11 NOTE — ASSESSMENT & PLAN NOTE
TTM completed early AM 10/9  Neuro following  CT head unremarkable in ED for acute process  EEG revealing seizure activity on 10/9  Seizures still suspected off Propofol with Keppra, plan add depakote  ICU neuro monitoring  Supportive care  Poor prognosis suspected for meaningful neuro recovery

## 2018-10-11 NOTE — SUBJECTIVE & OBJECTIVE
Interval History: Pt was seen and examined at bedside . She is requiring ventilation support . Pt   Is now on kepra  And Depakote .  The propofol was d/c . There no  appreciable seizures.   She withdrawn to pain .  There is  No gag or ocular reflex .   The pupils are unreactives     Review of Systems   Unable to perform ROS: Intubated     Objective:     Vital Signs (Most Recent):  Temp: (!) 94.9 °F (34.9 °C) (10/11/18 1705)  Pulse: (!) 57 (10/11/18 1652)  Resp: 19 (10/11/18 1652)  BP: (!) 127/34 (10/11/18 1620)  SpO2: 100 % (10/11/18 1652) Vital Signs (24h Range):  Temp:  [93.5 °F (34.2 °C)-100.5 °F (38.1 °C)] 94.9 °F (34.9 °C)  Pulse:  [48-92] 57  Resp:  [5-35] 19  SpO2:  [97 %-100 %] 100 %  BP: (108-171)/(27-88) 127/34     Weight: 76.8 kg (169 lb 5 oz)  Body mass index is 33.24 kg/m².    Intake/Output Summary (Last 24 hours) at 10/11/2018 1751  Last data filed at 10/11/2018 1600  Gross per 24 hour   Intake 2769.1 ml   Output 1760 ml   Net 1009.1 ml      Physical Exam   Constitutional: She appears well-nourished. She has a sickly appearance. She appears ill. No distress. She is sedated and intubated.   HENT:   Head: Normocephalic.   Eyes: Conjunctivae and lids are normal. Pupils are equal, round, and reactive to light.   Neck: Neck supple. No JVD present. Carotid bruit is not present. No tracheal deviation present.       Cardiovascular: Normal rate and regular rhythm.   Pulses:       Radial pulses are 1+ on the right side, and 1+ on the left side.        Dorsalis pedis pulses are 1+ on the right side, and 1+ on the left side.   Pulmonary/Chest: She is intubated. No respiratory distress. She has no wheezes. She has rales.           Abdominal: Soft. She exhibits no distension. Bowel sounds are decreased.   Musculoskeletal: She exhibits no edema or deformity.   Diffuse mild edema   Lymphadenopathy:     She has no cervical adenopathy.   Neurological: She is unresponsive. GCS eye subscore is 1. GCS verbal subscore is 1.  GCS motor subscore is 1.   No posturing or spont movements.  No gag or corneal reflex.  No withdrawal to pain.   Skin: Skin is warm and dry. Capillary refill takes less than 2 seconds. She is not diaphoretic. No cyanosis.       Significant Labs: All pertinent labs within the past 24 hours have been reviewed.    Significant Imaging: I have reviewed all pertinent imaging results/findings within the past 24 hours.

## 2018-10-11 NOTE — PLAN OF CARE
Problem: Ventilation, Mechanical Invasive (Adult)  Goal: Signs and Symptoms of Listed Potential Problems Will be Absent, Minimized or Managed (Ventilation, Mechanical Invasive)  Signs and symptoms of listed potential problems will be absent, minimized or managed by discharge/transition of care (reference Ventilation, Mechanical Invasive (Adult) CPG).  Outcome: Ongoing (interventions implemented as appropriate)  Pt remains on mechanical ventilation. No events overnight.

## 2018-10-11 NOTE — ASSESSMENT & PLAN NOTE
Cont full vent support  Vent settings reviewed   VAP prophylaxis  Brief SAT this am reveal concern for ongoing seizures

## 2018-10-11 NOTE — PROGRESS NOTES
Pt continues to be on mechanical vent with no significant changes to note from a respiratory standpoint.

## 2018-10-11 NOTE — PROGRESS NOTES
[facility] Hospital Medicine  Progress Note    Patient Name: Charline Messer  MRN: [id number]  Patient Class: IP- Inpatient   Admission Date: 10/7/2018  Length of Stay: 4 days  Attending Physician: Jose Eduardo Meek, *  Primary Care Provider: Primary Doctor No        Subjective:     Principal Problem:Anoxic brain injury    HPI:  Ms. 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  Patient was complaining of shortness of breath and collapsed.  Medical staff immediately started CPR, received two rounds of epinephrine, one round of atropine.  She was intubated on the scene and brought to the ED.  GCS upon arrival 3.  No corneal reflex, no gag reflex, not breathing over the vent.  Laboratory workup reveals lactic acid greater than 12, hypotensive with systolic in the 80s, initiated on Levophed drip.  Currently not on sedation except for IV Ativan as needed.  Chest x-ray reveals sternal fracture and multiple anterior rib fractures from the CPR earlier today.  CT head negative for ischemia or hemorrhage.  Patient had a pneumothorax, chest tube placed in the ED. Patient's  and son at the bedside hysterical.  Do not want to discuss any further resuscitative measures.  Patient remains full code at this time.  Patient to be initiated on hypothermia protocol.    Hospital Course:  82 y/o aaf admitted to icu  On mechanical ventilation   After cardiac arrest .  She was started on hypothermic  Protocol. Pt is s/p right side chest tube  2/2/ to  Traumatic pneumothorax . Patient with no cough, gag or corneal reflexes  .  There is no motor , sensory  Or painful response  . The TTE  Show  Normal EF , no WMA  And mild PHT . Pt was started on Cardene drip  Due to elevated bp    sbp > 200 . Pt  Became hypotensive and levophed was started to keep a map > 65 .  Pt was re- warm  .Now  she has new seizures onset . The EEG  Show seizures  Activity  And started on seizures medication .  Pt   Now on 2 seizures medication kappra and Depakote .  The propofol was d/c and  Is seizure free .         Interval History: Pt was seen and examined at bedside . She is requiring ventilation support . Pt   Is now on kepra  And Depakote .  The propofol was d/c . There no  appreciable seizures.   She withdrawn to pain .  There is  No gag or ocular reflex .   The pupils are unreactives     Review of Systems   Unable to perform ROS: Intubated     Objective:     Vital Signs (Most Recent):  Temp: (!) 94.9 °F (34.9 °C) (10/11/18 1705)  Pulse: (!) 57 (10/11/18 1652)  Resp: 19 (10/11/18 1652)  BP: (!) 127/34 (10/11/18 1620)  SpO2: 100 % (10/11/18 1652) Vital Signs (24h Range):  Temp:  [93.5 °F (34.2 °C)-100.5 °F (38.1 °C)] 94.9 °F (34.9 °C)  Pulse:  [48-92] 57  Resp:  [5-35] 19  SpO2:  [97 %-100 %] 100 %  BP: (108-171)/(27-88) 127/34     Weight: 76.8 kg (169 lb 5 oz)  Body mass index is 33.24 kg/m².    Intake/Output Summary (Last 24 hours) at 10/11/2018 1751  Last data filed at 10/11/2018 1600  Gross per 24 hour   Intake 2769.1 ml   Output 1760 ml   Net 1009.1 ml      Physical Exam   Constitutional: She appears well-nourished. She has a sickly appearance. She appears ill. No distress. She is sedated and intubated.   HENT:   Head: Normocephalic.   Eyes: Conjunctivae and lids are normal. Pupils are equal, round, and reactive to light.   Neck: Neck supple. No JVD present. Carotid bruit is not present. No tracheal deviation present.       Cardiovascular: Normal rate and regular rhythm.   Pulses:       Radial pulses are 1+ on the right side, and 1+ on the left side.        Dorsalis pedis pulses are 1+ on the right side, and 1+ on the left side.   Pulmonary/Chest: She is intubated. No respiratory distress. She has no wheezes. She has rales.           Abdominal: Soft. She exhibits no distension. Bowel sounds are decreased.    Musculoskeletal: She exhibits no edema or deformity.   Diffuse mild edema   Lymphadenopathy:     She has no cervical adenopathy.   Neurological: She is unresponsive. GCS eye subscore is 1. GCS verbal subscore is 1. GCS motor subscore is 1.   No posturing or spont movements.  No gag or corneal reflex.  No withdrawal to pain.   Skin: Skin is warm and dry. Capillary refill takes less than 2 seconds. She is not diaphoretic. No cyanosis.       Significant Labs: All pertinent labs within the past 24 hours have been reviewed.    Significant Imaging: I have reviewed all pertinent imaging results/findings within the past 24 hours.    Assessment/Plan:      * Anoxic brain injury    - s/p hypothermia protocol  -neurology on board   -cont supportive t x            Anemia and Thrombocytopenia    Unknown etiology   H/H trending down  .  Transfuse as needed  If < than 7           Anoxic seizures    Cont supportive  tx   Neurology on board  Cont kepra and Depakote           Traumatic fracture of ribs with pneumothorax, right, closed, initial encounter    Chest tube per CT surgery           Lactic acidemia    Lactic acid greater than 12.  Secondary to cardiac arrest.  Repeat every 4 hr x2.  Continue IV vancomycin, IV Zosyn empirically.  Follow up on cultures.          Acute hypoxemic respiratory failure    2/2 to cardiopulmonary arrest   Cont ventilation support  . Wean  As tolerated         Cardiac arrest    -Unclear etiology.  CPR done for approximately 30 min per nursing staff.  -Initial rhythm was bradycardic, received atropine and epinephrine with return of spontaneous circulation after two rounds of epi.  -s/p hypothermia protocol   - Neurology on board   -Poor prognosis               Coronary artery disease involving native coronary artery of native heart without angina pectoris    Cont BB , asa , plavix  and statin   TTE show nl EF  And no WMA           PAD (peripheral artery disease)    Cont  Current  tx         Essential  hypertension     Cont current tx             VTE Risk Mitigation (From admission, onward)        Ordered     IP VTE HIGH RISK PATIENT  Once      10/07/18 0922     Place sequential compression device  Until discontinued      10/07/18 0522           Plan:  -Cont supportive tx  -S/p hypothermia protocol  -The son  revoke the DNR . Pt now is a full  code  -Pt is unable to consent due to medical condition  Therefore , okay for records to be released to  .   - cont prophylactic IVAB . The Blood cx remain ngtd      -Condition:  Critical   -Prognosis: Poor   -Code Status: Full code    -Disposition plan:   Pending acute illness resolve    -Consultant:  Pulmonology , Neurology  And CT surgery   -Nutrition:  TF   -PT/OT:  ----  -Antibiotic :   Zosyn   -Family :  Case was discussed in detail with family .   -DVT Prophylaxis :  lovenox    -Out patient Follow up :  -----    -Out Patient  Test :------     -New medication /medication changes:   -----  -Pertinent Lab/Cultures : -----  -Pertinent test:  ----  -Surgery /Procedure : s/p right side chest tube         Critical care time spent on the evaluation and treatment of severe organ dysfunction, review of pertinent labs and imaging studies, discussions with consulting providers and discussions with patient/family: 35  minutes.    Jose Eduardo Meek MD  Department of Hospital Medicine   [facility]

## 2018-10-11 NOTE — ASSESSMENT & PLAN NOTE
-Unclear etiology.  CPR done for approximately 30 min per nursing staff.  -Initial rhythm was bradycardic, received atropine and epinephrine with return of spontaneous circulation after two rounds of epi.  -s/p hypothermia protocol   - Neurology on board   -Poor prognosis

## 2018-10-11 NOTE — ASSESSMENT & PLAN NOTE
No active bleeding  Conservative transfusion protocol  Follow CBC  stop LMWH given continued platelet decline

## 2018-10-11 NOTE — SUBJECTIVE & OBJECTIVE
Review of Systems   Unable to perform ROS: Intubated       Objective:     Vital Signs (Most Recent):  Temp: 99.6 °F (37.6 °C) (10/11/18 0302)  Pulse: 62 (10/11/18 0848)  Resp: (!) 22 (10/11/18 0848)  BP: (!) 139/43 (10/11/18 0615)  SpO2: 100 % (10/11/18 0848) Vital Signs (24h Range):  Temp:  [98.6 °F (37 °C)-100.5 °F (38.1 °C)] 99.6 °F (37.6 °C)  Pulse:  [30-94] 62  Resp:  [16-35] 22  SpO2:  [94 %-100 %] 100 %  BP: (117-251)/() 139/43     Weight: 76.8 kg (169 lb 5 oz)  Body mass index is 33.24 kg/m².      Intake/Output Summary (Last 24 hours) at 10/11/2018 0856  Last data filed at 10/11/2018 0804  Gross per 24 hour   Intake 2757.75 ml   Output 1483 ml   Net 1274.75 ml       Physical Exam   Constitutional: She appears well-nourished. She has a sickly appearance. She appears ill. No distress. She is sedated and intubated.   HENT:   Head: Normocephalic.   Eyes: Conjunctivae and lids are normal. Pupils are equal, round, and reactive to light.   Neck: Neck supple. No JVD present. Carotid bruit is not present. No tracheal deviation present.       Cardiovascular: Normal rate and regular rhythm.   Pulses:       Radial pulses are 1+ on the right side, and 1+ on the left side.        Dorsalis pedis pulses are 1+ on the right side, and 1+ on the left side.   Pulmonary/Chest: She is intubated. No respiratory distress. She has no wheezes. She has rales.           Abdominal: Soft. She exhibits no distension. Bowel sounds are decreased.   Musculoskeletal: She exhibits no edema or deformity.   Diffuse mild edema   Lymphadenopathy:     She has no cervical adenopathy.   Neurological: She is unresponsive. GCS eye subscore is 1. GCS verbal subscore is 1. GCS motor subscore is 1.   No posturing or spont movements.  No gag or corneal reflex.  No withdrawal to pain.   Skin: Skin is warm and dry. Capillary refill takes less than 2 seconds. She is not diaphoretic. No cyanosis.       Vents:  Vent Mode: A/C (10/11/18 0848)  Set Rate: 22  bmp (10/11/18 0848)  Vt Set: 0 mL (10/11/18 0848)  Pressure Support: 0 cmH20 (10/11/18 0848)  PEEP/CPAP: 5 cmH20 (10/11/18 0848)  Oxygen Concentration (%): 35 (10/11/18 0848)  Peak Airway Pressure: 29 cmH2O (10/11/18 0848)  Plateau Pressure: 0 cmH20 (10/11/18 0848)  Total Ve: 7.62 mL (10/11/18 0848)  F/VT Ratio<105 (RSBI): (!) 62.68 (10/11/18 0848)    Lines/Drains/Airways     Central Venous Catheter Line                 Percutaneous Central Line Insertion/Assessment - triple lumen  10/07/18 1105 left internal jugular 3 days          Drain                 NG/OG Tube 10/07/18 0230 orogastric 16 Fr. Right mouth 4 days         Chest Tube 10/07/18 1238 1 Right Fourth intercostal space 24 Fr. 3 days         Urethral Catheter 10/07/18 1200 Temperature probe 16 Fr. 3 days         Rectal Tube 10/10/18 0302 fecal management system 1 day          Airway                 Airway - Non-Surgical 10/07/18 0145 Endotracheal Tube 4 days          Peripheral Intravenous Line                 Peripheral IV - Double Lumen 10/07/18 0157 Right Antecubital 4 days                Significant Labs:    CBC/Anemia Profile:  Recent Labs   Lab  10/10/18   0400  10/10/18   1638  10/11/18   0402   WBC  10.12  9.17  7.69   HGB  7.9*  7.9*  7.4*   HCT  25.3*  24.6*  22.9*   PLT  97*  97*  86*   MCV  88  88  87   RDW  15.2*  15.1*  15.0*        Chemistries:  Recent Labs   Lab  10/09/18   1242   10/10/18   0400  10/10/18   1638  10/11/18   0402   NA  136   < >  140  139  138   K  4.3   < >  4.0  3.5  3.7   CL  107   < >  108  108  110   CO2  20*   < >  24  23  21*   BUN  17   < >  16  14  13   CREATININE  0.9   < >  0.9  0.8  0.8   CALCIUM  7.0*   < >  7.0*  7.1*  7.4*   ALBUMIN   --    < >  2.2*  2.1*  1.9*   PROT   --    < >  5.0*  4.9*  4.6*   BILITOT   --    < >  0.4  0.5  0.5   ALKPHOS   --    < >  83  82  74   ALT   --    < >  36  40  37   AST   --    < >  83*  95*  90*   MG  2.6   --   2.2   --   1.7   PHOS  3.0   --   2.9   --   2.4*    < > =  values in this interval not displayed.       All pertinent labs within the past 24 hours have been reviewed.  [facility]  Recent Labs   Lab  10/11/18   0438   PH  7.410   PO2  100   PCO2  35.0   HCO3  22.2*   BE  -2         Significant Imaging:  I have reviewed all pertinent imaging results/findings within the past 24 hours.

## 2018-10-11 NOTE — PROGRESS NOTES
Progress Note  Neurological ICU    Admit Date: 10/7/2018   LOS: 4 days     SUBJECTIVE:     Follow-up :    This morning, NP tried to taper off the Propofol , she did show some seizure like activities. Propofol was back on. [place] has been added on . Propofol is running @ 10 mcg /min. No seizure activities.    Continuous Infusions:   sodium chloride 0.9% 40 mL/hr at 10/11/18 1442    propofol Stopped (10/11/18 1430)     Scheduled Meds:   aspirin  81 mg Oral Daily    atorvastatin  80 mg Oral Daily    chlorhexidine  15 mL Mouth/Throat BID    clopidogrel  75 mg Oral Daily    famotidine (PF)  20 mg Intravenous BID    levetiracetam IVPB  1,500 mg Intravenous Q12H    metoprolol  5 mg Intravenous Q6H    polyethylene glycol  17 g Oral Daily    valproate sodium (DEPACON) IVPB  500 mg Intravenous Q12H     PRN Meds:bisacodyl, dextrose 50%, dextrose 50%, glucagon (human recombinant), glucose, glucose, influenza, insulin aspart U-100, magnesium sulfate IVPB, magnesium sulfate IVPB, potassium chloride in water **AND** potassium chloride in water **AND** potassium chloride in water, sodium phosphate IVPB, sodium phosphate IVPB, sodium phosphate IVPB    Review of patient's allergies indicates:   Allergen Reactions    Codeine Nausea And Vomiting    Ibuprofen Hives and Itching           Zetia [ezetimibe] Nausea And Vomiting       OBJECTIVE:     Vital Signs (Most Recent)  Temp: 98.5 °F (36.9 °C) (10/11/18 1105)  Pulse: (!) 52 (10/11/18 1338)  Resp: (!) 22 (10/11/18 1338)  BP: (!) 108/34 (10/11/18 1335)  SpO2: 100 % (10/11/18 1338)    Vital Signs Range (Last 24H):  Temp:  [98.5 °F (36.9 °C)-100.5 °F (38.1 °C)]   Pulse:  [51-94]   Resp:  [5-35]   BP: (108-177)/(27-88)   SpO2:  [96 %-100 %]     I & O (Last 24H):    Intake/Output Summary (Last 24 hours) at 10/11/2018 1455  Last data filed at 10/11/2018 1442  Gross per 24 hour   Intake 3550.25 ml   Output 1773 ml   Net 1777.25 ml     Ventilator Data (Last 24H):     Vent Mode:  A/C  Oxygen Concentration (%):  [35] 35  Resp Rate Total:  [18 br/min-26 br/min] 22 br/min  Vt Set:  [0 mL] 0 mL  PEEP/CPAP:  [5 cmH20] 5 cmH20  Pressure Support:  [0 cmH20] 0 cmH20  Mean Airway Pressure:  [9.6 ucN90-29 cmH20] 12 cmH20    Hemodynamic Parameters (Last 24H):       Physical Exam:  Before Propofol stopped:  [place] Coma Scale (GCS)    Score   EYE OPENING   Spontaneous 4   Response to verbal command 3   Response to pain 2   No eye opening 1               VERBAL response   Oriented 5   Confused 4   Inappropriate words 3   Incomprehensible sounds 2   No verbal response 1         MOTOR response   Obeys commands 6   Localizing response to pain 5   Withdrawal response to pain 4   Flexion to pain 3   Extension to pain 2   No motor response 1   Total  3     The GCS is scored between 3 and 15, 3 being the worst and 15 the best. It is composed of three parameters: best eye response (E), best verbal response (V), and best motor response (M). The components of the GCS should be recorded individually; for example, [id number] results in a GCS score of 9. A score of 13 or higher correlates with mild brain injury, a score of 9 to 12 correlates with moderate injury, and a score of 8 or less represents severe brain injury.    After Propofol stopped for 5-10 minutes;    Carmel Coma Scale (GCS)    Score   EYE OPENING   Spontaneous 4   Response to verbal command 3   Response to pain 2   No eye opening 1               VERBAL response   Oriented 5   Confused 4   Inappropriate words 3   Incomprehensible sounds 2   No verbal response 1         MOTOR response   Obeys commands 6   Localizing response to pain 5   Withdrawal response to pain 4   Flexion to pain 3   Extension to pain 2   No motor response 1   Total  6     The GCS is scored between 3 and 15, 3 being the worst and 15 the best. It is composed of three parameters: best eye response (E), best verbal response (V), and best motor response (M). The components of the GCS should  be recorded individually; for example, [id number] results in a GCS score of 9. A score of 13 or higher correlates with mild brain injury, a score of 9 to 12 correlates with moderate injury, and a score of 8 or less represents severe brain injury.  No seizure was noticed . Brachial reflex +1 bilaterally , Plantar : equivocal. Other DTR was not elicited.     Lines/Drains:       Percutaneous Central Line Insertion/Assessment - triple lumen  10/07/18 1105 left internal jugular (Active)   Dressing biopatch in place;dressing dry and intact 10/11/2018 11:05 AM   Securement secured w/ sutures 10/11/2018 11:05 AM   Additional Site Signs no edema;no erythema;no warmth;no pain;drainage 10/11/2018 11:05 AM   Distal Patency/Care flushed w/o difficulty;infusing;blood return present 10/11/2018 11:05 AM   Medial Patency/Care flushed w/o difficulty;infusing;blood return present 10/11/2018 11:05 AM   Proximal Patency/Care flushed w/o difficulty;infusing;blood return present 10/11/2018 11:05 AM   Dressing Change Due 10/14/18 10/11/2018 11:05 AM   Daily Line Review Performed 10/11/2018 11:05 AM   Number of days: 4            Chest Tube 10/07/18 1238 1 Right Fourth intercostal space 24 Fr. (Active)   Chest Tube WDL WDL 10/11/2018  7:05 AM   Function -20 cm H2O 10/11/2018 11:05 AM   Air Leak/Fluctuation air leak not present;dependent drainage cleared;connections tightened 10/11/2018 11:05 AM   Safety all tubing connections taped;2 rubber-tipped hemostats w/ patient;all connections secured;suction checked 10/11/2018 11:05 AM   Securement tubing anchored to body distal to insertion site w/ tape 10/11/2018 11:05 AM   Patency Intervention Tip/tilt 10/11/2018 11:05 AM   Drainage Description Serosanguineous 10/11/2018 11:05 AM   Dressing Appearance occlusive gauze dressing intact;clean and dry 10/11/2018 11:05 AM   Dressing Care dressing reinforced 10/11/2018 11:05 AM   Left Subcutaneous Emphysema none present 10/11/2018 11:05 AM   Right  Subcutaneous Emphysema none present 10/11/2018 11:05 AM   Site Assessment Clean;Dry;No redness;Intact;No swelling 10/11/2018 11:05 AM   Surrounding Skin Intact;Dry 10/11/2018 11:05 AM   Output (mL) 0 mL 10/11/2018  3:00 AM   Number of days: 4            NG/OG Tube 10/07/18 0230 orogastric 16 Fr. Right mouth (Active)   Placement Check placement verified by distal tube length measurement;placement verified by x-ray;placement verified by aspirate characteristics;placement verified by aspirate pH 10/11/2018 11:05 AM   pH Aspirate Result 5 10/11/2018  7:05 AM   Tube advanced (cm) 53 10/7/2018  9:30 AM   Distal Tube Length (cm) 60 10/11/2018  7:05 AM   Tolerance no signs/symptoms of discomfort 10/11/2018 11:05 AM   Securement taped to commercial device 10/11/2018 11:05 AM   Clamp Status/Tolerance unclamped 10/11/2018 11:05 AM   Suction Setting/Drainage Method low;suction at;intermittent setting 10/10/2018  3:03 AM   Insertion Site Appearance no redness, warmth, tenderness, skin breakdown, drainage 10/11/2018 11:05 AM   Drainage Clear 10/11/2018 11:05 AM   Flush/Irrigation flushed w/;water 10/11/2018 11:05 AM   Feeding Method continuous 10/9/2018  5:05 PM   Feeding Action feeding restarted 10/11/2018 11:05 AM   Current Rate (mL/hr) 10 mL/hr 10/11/2018 11:05 AM   Goal Rate (mL/hr) 40 mL/hr 10/11/2018 11:05 AM   Intake (mL) 40 mL 10/11/2018 11:05 AM   Tube Output(mL)(Include Discarded Residual) 25 mL 10/7/2018  8:00 AM   Intake (mL) - Formula Tube Feeding 10 10/11/2018  2:00 PM   Residual Amount (ml) 10 ml 10/11/2018 11:05 AM   Number of days: 4            Rectal Tube 10/10/18 0302 fecal management system (Active)   Balloon Inflation Volume (mL) 40 10/11/2018 11:05 AM   Reposition drainage bags for BMS & Swain on opposite sides of bed 10/11/2018 11:05 AM   Outcome stool evacuated 10/11/2018  3:02 AM   Stool Color Brown 10/11/2018 11:05 AM   Insertion Site Appearance no redness, warmth, tenderness, skin breakdown, drainage  "10/11/2018 11:05 AM   Flush/Irrigation flushed w/;water 10/11/2018 11:05 AM   Intake (mL) 60 mL 10/11/2018  7:05 AM   Rectal Tube Output 50 mL 10/11/2018  6:00 AM   Number of days: 1            Urethral Catheter 10/07/18 1200 Temperature probe 16 Fr. (Active)   Site Assessment Clean;Intact 10/11/2018 11:05 AM   Collection Container Urimeter 10/11/2018 11:05 AM   Securement Method secured to top of thigh w/ adhesive device 10/11/2018 11:05 AM   Catheter Care Performed yes 10/11/2018 11:05 AM   Reason for Continuing Urinary Catheterization Critically ill in ICU requiring intensive monitoring 10/11/2018 11:05 AM   CAUTI Prevention Bundle StatLock in place w 1" slack;Intact seal between catheter & drainage tubing;Drainage bag off the floor;Green sheeting clip in use;No dependent loops or kinks;Drainage bag not overfilled (<2/3 full);Drainage bag below bladder 10/11/2018  3:02 AM   Output (mL) 325 mL 10/11/2018  2:00 PM   Number of days: 4       Laboratory (Last 24H):  CBC:  Recent Labs   Lab  10/11/18   0402   WBC  7.69   HGB  7.4*   HCT  22.9*   PLT  86*     CMP:  Recent Labs   Lab  10/11/18   0402   CALCIUM  7.4*   ALBUMIN  1.9*   PROT  4.6*   NA  138   K  3.7   CO2  21*   CL  110   BUN  13   CREATININE  0.8   ALKPHOS  74   ALT  37   AST  90*   BILITOT  0.5       ASSESSMENT/PLAN:      1. Cardiac arrest s/p resuscitation: on vent and unresponsive.   2. Seizure: patient seized and now on 3 medication for seizure control. No  Stable Clinical improvement  Achieved even with 3 medication. We will check labs and optimize the AED s.  3. 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 EEG was done but showed seizure activities. Neurological assessment is difficult in presence of ongoing seizure for prognosis. She might need  neuro EEG monitoring system to see the control of seizure before making the final decision as to the continuation of life support or prognostic " outcome.              Plan:   Will follow the seizure outcome from use of depakote and Keppra.    Counseling/Consultation:I discussed the case with Dr. Kitchen ..    Critical Care Time greater than: 30 Minutes

## 2018-10-12 VITALS
WEIGHT: 169.31 LBS | TEMPERATURE: 98 F | SYSTOLIC BLOOD PRESSURE: 190 MMHG | BODY MASS INDEX: 33.24 KG/M2 | RESPIRATION RATE: 19 BRPM | DIASTOLIC BLOOD PRESSURE: 59 MMHG | HEART RATE: 81 BPM | HEIGHT: 60 IN | OXYGEN SATURATION: 100 %

## 2018-10-12 LAB
ALBUMIN SERPL BCP-MCNC: 1.7 G/DL
ALBUMIN SERPL BCP-MCNC: 1.7 G/DL
ALLENS TEST: ABNORMAL
ALP SERPL-CCNC: 72 U/L
ALP SERPL-CCNC: 76 U/L
ALT SERPL W/O P-5'-P-CCNC: 27 U/L
ALT SERPL W/O P-5'-P-CCNC: 31 U/L
ANION GAP SERPL CALC-SCNC: 6 MMOL/L
ANION GAP SERPL CALC-SCNC: 7 MMOL/L
APTT BLDCRRT: 27.9 SEC
AST SERPL-CCNC: 65 U/L
AST SERPL-CCNC: 70 U/L
BACTERIA BLD CULT: NORMAL
BACTERIA BLD CULT: NORMAL
BASOPHILS # BLD AUTO: 0.01 K/UL
BASOPHILS # BLD AUTO: 0.01 K/UL
BASOPHILS NFR BLD: 0.2 %
BASOPHILS NFR BLD: 0.2 %
BILIRUB SERPL-MCNC: 0.6 MG/DL
BILIRUB SERPL-MCNC: 0.6 MG/DL
BUN SERPL-MCNC: 16 MG/DL
BUN SERPL-MCNC: 17 MG/DL
CALCIUM SERPL-MCNC: 7.9 MG/DL
CALCIUM SERPL-MCNC: 8 MG/DL
CHLORIDE SERPL-SCNC: 111 MMOL/L
CHLORIDE SERPL-SCNC: 111 MMOL/L
CO2 SERPL-SCNC: 20 MMOL/L
CO2 SERPL-SCNC: 21 MMOL/L
CREAT SERPL-MCNC: 0.8 MG/DL
CREAT SERPL-MCNC: 0.8 MG/DL
DELSYS: ABNORMAL
DIFFERENTIAL METHOD: ABNORMAL
DIFFERENTIAL METHOD: ABNORMAL
EOSINOPHIL # BLD AUTO: 0.5 K/UL
EOSINOPHIL # BLD AUTO: 0.5 K/UL
EOSINOPHIL NFR BLD: 12.1 %
EOSINOPHIL NFR BLD: 12.4 %
ERYTHROCYTE [DISTWIDTH] IN BLOOD BY AUTOMATED COUNT: 14.4 %
ERYTHROCYTE [DISTWIDTH] IN BLOOD BY AUTOMATED COUNT: 14.4 %
ERYTHROCYTE [SEDIMENTATION RATE] IN BLOOD BY WESTERGREN METHOD: 22 MM/H
EST. GFR  (AFRICAN AMERICAN): >60 ML/MIN/1.73 M^2
EST. GFR  (AFRICAN AMERICAN): >60 ML/MIN/1.73 M^2
EST. GFR  (NON AFRICAN AMERICAN): >60 ML/MIN/1.73 M^2
EST. GFR  (NON AFRICAN AMERICAN): >60 ML/MIN/1.73 M^2
FIO2: 35
GLUCOSE SERPL-MCNC: 105 MG/DL
GLUCOSE SERPL-MCNC: 92 MG/DL
HCO3 UR-SCNC: 22.2 MMOL/L (ref 24–28)
HCT VFR BLD AUTO: 21.5 %
HCT VFR BLD AUTO: 21.6 %
HGB BLD-MCNC: 7 G/DL
HGB BLD-MCNC: 7.2 G/DL
INR PPP: 1
IP: 24
IT: 0.86
LYMPHOCYTES # BLD AUTO: 0.9 K/UL
LYMPHOCYTES # BLD AUTO: 1 K/UL
LYMPHOCYTES NFR BLD: 19.9 %
LYMPHOCYTES NFR BLD: 23.6 %
MAGNESIUM SERPL-MCNC: 1.7 MG/DL
MCH RBC QN AUTO: 28.2 PG
MCH RBC QN AUTO: 28.3 PG
MCHC RBC AUTO-ENTMCNC: 32.4 G/DL
MCHC RBC AUTO-ENTMCNC: 33.5 G/DL
MCV RBC AUTO: 85 FL
MCV RBC AUTO: 87 FL
MODE: ABNORMAL
MONOCYTES # BLD AUTO: 0.4 K/UL
MONOCYTES # BLD AUTO: 0.5 K/UL
MONOCYTES NFR BLD: 10.5 %
MONOCYTES NFR BLD: 8.3 %
NEUTROPHILS # BLD AUTO: 2.3 K/UL
NEUTROPHILS # BLD AUTO: 2.4 K/UL
NEUTROPHILS NFR BLD: 55.8 %
NEUTROPHILS NFR BLD: 57 %
NSE SERPL-MCNC: 111 NG/ML
OB PNL STL: POSITIVE
PCO2 BLDA: 27.4 MMHG (ref 35–45)
PEEP: 5
PH SMN: 7.52 [PH] (ref 7.35–7.45)
PHOSPHATE SERPL-MCNC: 2.5 MG/DL
PIP: 29
PLATELET # BLD AUTO: 83 K/UL
PLATELET # BLD AUTO: 92 K/UL
PMV BLD AUTO: 11.9 FL
PMV BLD AUTO: 12.3 FL
PO2 BLDA: 176 MMHG (ref 80–100)
POC BE: -1 MMOL/L
POC SATURATED O2: 100 % (ref 95–100)
POCT GLUCOSE: 102 MG/DL (ref 70–110)
POCT GLUCOSE: 105 MG/DL (ref 70–110)
POCT GLUCOSE: 110 MG/DL (ref 70–110)
POCT GLUCOSE: 127 MG/DL (ref 70–110)
POCT GLUCOSE: 136 MG/DL (ref 70–110)
POTASSIUM SERPL-SCNC: 4.2 MMOL/L
POTASSIUM SERPL-SCNC: 4.3 MMOL/L
PROT SERPL-MCNC: 4.3 G/DL
PROT SERPL-MCNC: 4.4 G/DL
PROTHROMBIN TIME: 10.6 SEC
RBC # BLD AUTO: 2.48 M/UL
RBC # BLD AUTO: 2.54 M/UL
SAMPLE: ABNORMAL
SITE: ABNORMAL
SODIUM SERPL-SCNC: 138 MMOL/L
SODIUM SERPL-SCNC: 138 MMOL/L
VALPROATE SERPL-MCNC: 22.7 UG/ML
WBC # BLD AUTO: 4.2 K/UL
WBC # BLD AUTO: 4.28 K/UL

## 2018-10-12 PROCEDURE — 83735 ASSAY OF MAGNESIUM: CPT

## 2018-10-12 PROCEDURE — 80164 ASSAY DIPROPYLACETIC ACD TOT: CPT

## 2018-10-12 PROCEDURE — 25000003 PHARM REV CODE 250: Performed by: NURSE PRACTITIONER

## 2018-10-12 PROCEDURE — 27200966 HC CLOSED SUCTION SYSTEM

## 2018-10-12 PROCEDURE — 94003 VENT MGMT INPAT SUBQ DAY: CPT

## 2018-10-12 PROCEDURE — 25000003 PHARM REV CODE 250: Performed by: INTERNAL MEDICINE

## 2018-10-12 PROCEDURE — 63600175 PHARM REV CODE 636 W HCPCS: Performed by: INTERNAL MEDICINE

## 2018-10-12 PROCEDURE — 99900035 HC TECH TIME PER 15 MIN (STAT)

## 2018-10-12 PROCEDURE — 99231 SBSQ HOSP IP/OBS SF/LOW 25: CPT | Mod: ,,, | Performed by: PSYCHIATRY & NEUROLOGY

## 2018-10-12 PROCEDURE — 25000003 PHARM REV CODE 250: Performed by: PSYCHIATRY & NEUROLOGY

## 2018-10-12 PROCEDURE — 85025 COMPLETE CBC W/AUTO DIFF WBC: CPT | Mod: 91

## 2018-10-12 PROCEDURE — 82803 BLOOD GASES ANY COMBINATION: CPT

## 2018-10-12 PROCEDURE — 84100 ASSAY OF PHOSPHORUS: CPT

## 2018-10-12 PROCEDURE — 85610 PROTHROMBIN TIME: CPT

## 2018-10-12 PROCEDURE — 82272 OCCULT BLD FECES 1-3 TESTS: CPT

## 2018-10-12 PROCEDURE — 85730 THROMBOPLASTIN TIME PARTIAL: CPT

## 2018-10-12 PROCEDURE — 63600175 PHARM REV CODE 636 W HCPCS: Performed by: NURSE PRACTITIONER

## 2018-10-12 PROCEDURE — 36600 WITHDRAWAL OF ARTERIAL BLOOD: CPT

## 2018-10-12 PROCEDURE — 99291 CRITICAL CARE FIRST HOUR: CPT | Mod: ,,, | Performed by: NURSE PRACTITIONER

## 2018-10-12 PROCEDURE — C9113 INJ PANTOPRAZOLE SODIUM, VIA: HCPCS | Performed by: NURSE PRACTITIONER

## 2018-10-12 PROCEDURE — 80053 COMPREHEN METABOLIC PANEL: CPT | Mod: 91

## 2018-10-12 PROCEDURE — S0028 INJECTION, FAMOTIDINE, 20 MG: HCPCS | Performed by: NURSE PRACTITIONER

## 2018-10-12 RX ORDER — LEVETIRACETAM 15 MG/ML
1500 INJECTION INTRAVASCULAR EVERY 12 HOURS
Qty: 1 ML | Refills: 0
Start: 2018-10-12

## 2018-10-12 RX ORDER — LEVETIRACETAM 15 MG/ML
1500 INJECTION INTRAVASCULAR EVERY 12 HOURS
Qty: 1 ML | Refills: 0
Start: 2018-10-12 | End: 2018-10-12

## 2018-10-12 RX ORDER — PANTOPRAZOLE SODIUM 40 MG/10ML
40 INJECTION, POWDER, LYOPHILIZED, FOR SOLUTION INTRAVENOUS 2 TIMES DAILY
Status: DISCONTINUED | OUTPATIENT
Start: 2018-10-12 | End: 2018-10-13 | Stop reason: HOSPADM

## 2018-10-12 RX ORDER — LANOLIN ALCOHOL/MO/W.PET/CERES
400 CREAM (GRAM) TOPICAL
Status: COMPLETED | OUTPATIENT
Start: 2018-10-12 | End: 2018-10-12

## 2018-10-12 RX ADMIN — DEXTROSE 500 MG: 50 INJECTION, SOLUTION INTRAVENOUS at 11:10

## 2018-10-12 RX ADMIN — SODIUM PHOSPHATE, MONOBASIC, MONOHYDRATE 15 MMOL: 276; 142 INJECTION, SOLUTION INTRAVENOUS at 05:10

## 2018-10-12 RX ADMIN — MAGNESIUM OXIDE TAB 400 MG (241.3 MG ELEMENTAL MG) 400 MG: 400 (241.3 MG) TAB at 08:10

## 2018-10-12 RX ADMIN — PROPOFOL 20 MCG/KG/MIN: 10 INJECTION, EMULSION INTRAVENOUS at 07:10

## 2018-10-12 RX ADMIN — CHLORHEXIDINE GLUCONATE 15 ML: 1.2 RINSE ORAL at 08:10

## 2018-10-12 RX ADMIN — LEVETIRACETAM 1500 MG: 15 INJECTION INTRAVENOUS at 08:10

## 2018-10-12 RX ADMIN — DEXTROSE 500 MG: 50 INJECTION, SOLUTION INTRAVENOUS at 10:10

## 2018-10-12 RX ADMIN — PANTOPRAZOLE SODIUM 40 MG: 40 INJECTION, POWDER, FOR SOLUTION INTRAVENOUS at 11:10

## 2018-10-12 RX ADMIN — HYOSCYAMINE SULFATE 0.12 MG: 0.12 TABLET ORAL; SUBLINGUAL at 08:10

## 2018-10-12 RX ADMIN — ATORVASTATIN CALCIUM 80 MG: 40 TABLET, FILM COATED ORAL at 08:10

## 2018-10-12 RX ADMIN — HYDRALAZINE HYDROCHLORIDE 10 MG: 20 INJECTION INTRAMUSCULAR; INTRAVENOUS at 07:10

## 2018-10-12 RX ADMIN — CLOPIDOGREL BISULFATE 75 MG: 75 TABLET ORAL at 08:10

## 2018-10-12 RX ADMIN — FAMOTIDINE 20 MG: 10 INJECTION INTRAVENOUS at 08:10

## 2018-10-12 RX ADMIN — MAGNESIUM SULFATE HEPTAHYDRATE 2 G: 40 INJECTION, SOLUTION INTRAVENOUS at 04:10

## 2018-10-12 RX ADMIN — MAGNESIUM OXIDE TAB 400 MG (241.3 MG ELEMENTAL MG) 400 MG: 400 (241.3 MG) TAB at 04:10

## 2018-10-12 RX ADMIN — ASPIRIN 81 MG CHEWABLE TABLET 81 MG: 81 TABLET CHEWABLE at 08:10

## 2018-10-12 RX ADMIN — PROPOFOL 20 MCG/KG/MIN: 10 INJECTION, EMULSION INTRAVENOUS at 06:10

## 2018-10-12 RX ADMIN — SODIUM CHLORIDE: 0.9 INJECTION, SOLUTION INTRAVENOUS at 04:10

## 2018-10-12 NOTE — PHYSICIAN QUERY
"PT Name: Charline Messer  MR #: [id number]     Physician Query Form - Etiology of Condition Clarification      CDS/: Liz Pineda RN CDI      Contact information: [email or web address]  This form is a permanent document in the medical record.     Query Date: October 12, 2018    By submitting this query, we are merely seeking further clarification of documentation.  Please utilize your independent clinical judgment when addressing the question(s) below.     The medical record contains the following:    Findings Supporting Clinical Information Location in Medical Record    83-year-old  female with PMH significant for HTN, CAD, was working at [employer], when she had a witnessed cardiac arrest at around midnight.  Patient was complaining of shortness of breath and collapsed.  Medical staff immediately started CPR, received two rounds of epinephrine, one round of atropine.  She was intubated on the scene and brought to the ED.  GCS upon arrival 3.  No corneal reflex, no gag reflex, not breathing over the vent.  Laboratory workup reveals lactic acid greater than 12, hypotensive with systolic in the 80s, initiated on Levophed drip.      -Norepinephrine drip   10/7 - 10/9   -Normal saline with 1 meq sodium bicarbinate IV continuous at 125/hr. 10/8, 10/10   -Dextrose 12.5 g IV 10/7 1019,  10/10 - 115   -Normal saline infusion at 130/hr 10/7 1030 10/8 0911  Normal saline 1000 ml 10/7 0334  Normal saline bolus 30 ml/kg - 2,154 ml - 10/7 0425  Normal saline bolus 500 ml IV 10/10 0005    NA - 133-135 from 10/8 - 10/9  CO2 - 130 - 20 from 10/8 - 10/9  Calcium 6.8 - 7.0 from 10/8 - 10/9  Phosphorus 2.6 * 2.9 10/8    ABG  (intubated)  10/7   PH _ 7.285  PCO2 - 36.7  PO2 - 155  HCO3 - 17.5   H&P                              Medication sheets                      Lab results          ABG 10/7 first documented     Please document your best medical opinion regarding the etiology of "Cardiac Arrest" for " which the primary focus of treatment is/was directed.       Cardio respiratory .   No  Etiology  Found   Pt with multiple CAD   Risk factors                    [    ]  Clinically Undetermined    Please document in your progress notes daily for the duration of treatment, until resolved, and include in your discharge summary.

## 2018-10-12 NOTE — PLAN OF CARE
Problem: Patient Care Overview  Goal: Plan of Care Review  Outcome: Ongoing (interventions implemented as appropriate)  Pt remains on mechanical ventilator; vent change post morning abg.

## 2018-10-12 NOTE — PLAN OF CARE
Problem: Patient Care Overview  Goal: Plan of Care Review  Outcome: Ongoing (interventions implemented as appropriate)  Plan of care reviewed at bedside w/ family. Pt cont to have seizure activity (nystagmus) even w/ Keppra given 30 min prior to SAT. Depakote added. No seizure activity assessed. Propofol d/c'd. Future seizure activity to be controlled with PRN Ativan per NP. Pt became bradycardic (40-60's) and diastolic BP decreased (30-40's) while depakote was infusing. Metoprolol d/c. Hydralazine ordered PRN. Discoloration noted under L breast. Will cont to monitor. TF restarted at 10cc/hr  w/ 80cc of residuals. UO decreased, 20mg lasix given. +2 edema noted in upper ext. Accuchecks restarted Q4H. Last blood sugar was 102. Lovenox d/c'd. Valprioc acid 13.7. Temperature at 1500 was 93.8 degrees F. Bare hugger applied. 1800 temp was 97.8. Bare hugger removed. Eyes open to stimulation. + gag reflex. Unable to follow commands. Tolerating ventilation. Right AC PIV d/c'd. Patient stable. Will cont to monitor

## 2018-10-12 NOTE — PROGRESS NOTES
[facility] Hospital Medicine  Progress Note    Patient Name: Charline Messer  MRN: [id number]  Patient Class: IP- Inpatient   Admission Date: 10/7/2018  Length of Stay: 5 days  Attending Physician: Jose Eduardo Meek, *  Primary Care Provider: Primary Doctor No        Subjective:     Principal Problem:Anoxic brain injury    HPI:  Ms. 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  Patient was complaining of shortness of breath and collapsed.  Medical staff immediately started CPR, received two rounds of epinephrine, one round of atropine.  She was intubated on the scene and brought to the ED.  GCS upon arrival 3.  No corneal reflex, no gag reflex, not breathing over the vent.  Laboratory workup reveals lactic acid greater than 12, hypotensive with systolic in the 80s, initiated on Levophed drip.  Currently not on sedation except for IV Ativan as needed.  Chest x-ray reveals sternal fracture and multiple anterior rib fractures from the CPR earlier today.  CT head negative for ischemia or hemorrhage.  Patient had a pneumothorax, chest tube placed in the ED. Patient's  and son at the bedside hysterical.  Do not want to discuss any further resuscitative measures.  Patient remains full code at this time.  Patient to be initiated on hypothermia protocol.    Hospital Course:  84 y/o aaf admitted to icu  On mechanical ventilation   After cardiac arrest .  She was started on hypothermic  Protocol. Pt is s/p right side chest tube  2/2/ to  Traumatic pneumothorax . Patient with no cough, gag or corneal reflexes  .  There is no motor , sensory  Or painful response  . The TTE  Show  Normal EF , no WMA  And mild PHT . Pt was started on Cardene drip  Due to elevated bp    sbp > 200 . Pt  Became hypotensive and levophed was started to keep a map > 65 .  Pt was re- warm  .Now  she has new seizures onset . The EEG  Show seizures  Activity  And started on seizures medication .  Pt   Now on 2 seizures medication kappra and Depakote .  The propofol was d/c and  Is seizure free .   Pt cont  with active seizures . The case was discussed with neurology which recommend  To transferre  To a higher level of care for a continues EEG monitor  Pt was accepted to [facility]  For a continues EEG  Monitor . The family refuse to go to  and  Want to stay  In  . The [facility] does not have  24 hrs EEG monitor .  Transfer center  Contacted [facility] which agree  To take the pt  ICU to ICU for a 24 hrs EEG monitor           Interval History: Pt cont requiring ventilation  Support .  She is having active  Seizures despite 2  antiseisures medication . Case was discussed with neurology which recommend to transferred to a higher level of care for a 24 hrs EEG monitor     Review of Systems   Unable to perform ROS: Intubated     Objective:     Vital Signs (Most Recent):  Temp: 97.5 °F (36.4 °C) (10/12/18 1113)  Pulse: 71 (10/12/18 1800)  Resp: 18 (10/12/18 1800)  BP: (!) 183/58 (10/12/18 1800)  SpO2: 100 % (10/12/18 1800) Vital Signs (24h Range):  Temp:  [97.5 °F (36.4 °C)-98 °F (36.7 °C)] 97.5 °F (36.4 °C)  Pulse:  [55-77] 71  Resp:  [10-22] 18  SpO2:  [100 %] 100 %  BP: (112-183)/() 183/58     Weight: 76.8 kg (169 lb 5 oz)  Body mass index is 33.24 kg/m².    Intake/Output Summary (Last 24 hours) at 10/12/2018 1808  Last data filed at 10/12/2018 1400  Gross per 24 hour   Intake 2036.43 ml   Output 1102 ml   Net 934.43 ml      Physical Exam   Constitutional: She appears well-nourished. She has a sickly appearance. She appears ill. No distress. She is intubated.   HENT:   Head: Normocephalic.   Eyes: Conjunctivae are normal. Pupils are equal, round, and reactive to light.   Left upward gaze bilaterally  Eyelids open with rhythmic upper lid flickering   Neck: No JVD present. Carotid bruit is not present. No  tracheal deviation present.       Cardiovascular: Normal rate and regular rhythm.   Pulses:       Radial pulses are 1+ on the right side, and 1+ on the left side.        Dorsalis pedis pulses are 1+ on the right side, and 1+ on the left side.   Pulmonary/Chest: She is intubated. No respiratory distress. She has no wheezes. She has rales.           Abdominal: Soft. She exhibits no distension. Bowel sounds are decreased.   Musculoskeletal: She exhibits no edema or deformity.   Diffuse mild edema   Lymphadenopathy:     She has no cervical adenopathy.   Neurological: She is unresponsive. She displays seizure activity. GCS eye subscore is 2. GCS verbal subscore is 1. GCS motor subscore is 2.   Faint extensor posturing with bilat upper ext to painful stimuli  - cough, gag, or corneal reflexes   Skin: Skin is warm and dry. Capillary refill takes less than 2 seconds. She is not diaphoretic. No cyanosis.       Significant Labs:   BMP:   Recent Labs   Lab  10/12/18   0400  10/12/18   1554   GLU  92  105   NA  138  138   K  4.3  4.2   CL  111*  111*   CO2  21*  20*   BUN  16  17   CREATININE  0.8  0.8   CALCIUM  8.0*  7.9*   MG  1.7   --      CBC:   Recent Labs   Lab  10/11/18   1548  10/12/18   0400  10/12/18   1554   WBC  5.59  4.20  4.28   HGB  7.3*  7.0*  7.2*   HCT  22.6*  21.6*  21.5*   PLT  89*  83*  92*       Significant Imaging: I have reviewed all pertinent imaging results/findings within the past 24 hours.    Assessment/Plan:      * Anoxic brain injury    - s/p hypothermia protocol  -neurology on board   -cont supportive t x              Anemia and Thrombocytopenia    Unknown etiology   H/H trending down  .  Transfuse as needed  If < than 7           Anoxic seizures    Cont supportive  tx   Neurology on board  Cont kepra and Depakote   Pt will be transferred  To a higher level of care for a 24 hrs  EEG monitor per neurology recommendation           Traumatic fracture of ribs with pneumothorax, right, closed, initial  encounter    Chest tube per CT surgery           Lactic acidemia    Lactic acid greater than 12.  Secondary to cardiac arrest.  Repeat every 4 hr x2.  Continue IV vancomycin, IV Zosyn empirically.  Follow up on cultures.          Acute hypoxemic respiratory failure    2/2 to cardiopulmonary arrest   Cont ventilation support  . Wean  As tolerated         Cardiac arrest    -Unclear etiology.  Possible cardio respiratory   -CPR done for approximately 30 min per nursing staff.  -Initial rhythm was bradycardic, received atropine and epinephrine with return of spontaneous circulation after two rounds of epi.  -s/p hypothermia protocol   - Neurology on board   -Poor prognosis                 Coronary artery disease involving native coronary artery of native heart without angina pectoris    Cont BB , asa , plavix  and statin   TTE show nl EF  And no WMA           PAD (peripheral artery disease)    Cont  Current  tx         Essential hypertension     Cont current tx             VTE Risk Mitigation (From admission, onward)        Ordered     IP VTE HIGH RISK PATIENT  Once      10/07/18 0922     Place sequential compression device  Until discontinued      10/07/18 0559          Critical care time spent on the evaluation and treatment of severe organ dysfunction, review of pertinent labs and imaging studies, discussions with consulting providers and discussions with patient/family: 35 minutes.    Jose Eduardo Meek MD  Department of Hospital Medicine   [facility]

## 2018-10-12 NOTE — ASSESSMENT & PLAN NOTE
Cont supportive  tx   Neurology on board  Cont kepra and Depakote   Pt will be transferred  To a higher level of care for a 24 hrs  EEG monitor per neurology recommendation

## 2018-10-12 NOTE — PLAN OF CARE
"ERIK spoke with Supervisor Tiffanie Fortune regarding finding out if [facility] has continuous monitoring EEG. She stated she will call and find out. ERIK was told per Tiffanie  They do have that service and she spoke with Wes in Nursing Administration. Erik notified Dr. Kitchen  8018- ERIK was told by Dr. Kitchen called the transfer center and was notified [facility] does not have continuous EEG monitoring.  ERIK notified Tiffanie and Dr. Kitchen was on the call. Tiffanie to f/u with nursing admin at [facility]. Tiffanie asked if [facility] was called. He stated he will call the transfer center and see if [facility] has this service. He explained to her the family is very "aggressive" and feels that   Management needs to come to speak with the patient or legal  "

## 2018-10-12 NOTE — PROGRESS NOTES
Stool for occult blood positive  Stop ASA  pepcid changed to BID IV protonix this am    Estefanía Medrano, ACNP-BC  [facility]

## 2018-10-12 NOTE — ASSESSMENT & PLAN NOTE
-Unclear etiology.  Possible cardio respiratory   -CPR done for approximately 30 min per nursing staff.  -Initial rhythm was bradycardic, received atropine and epinephrine with return of spontaneous circulation after two rounds of epi.  -s/p hypothermia protocol   - Neurology on board   -Poor prognosis

## 2018-10-12 NOTE — PHYSICIAN QUERY
PT Name: Charline Messer  MR #: [id number]    Physician Query Form - Perfusion Diagnosis Clarification     CDS/: Liz Pineda RN CDI          Contact information: [email or web address]  This form is a permanent document in the medical record.     Query Date: October 12, 2018    By submitting this query, we are merely seeking further clarification of documentation. Please utilize your independent clinical judgment when addressing the question(s) below.    The medical record contains the following:    Indicators   Supporting Clinical Findings   Location in Medical Record   X Acute Illness (e.g. AMI, Sepsis, etc.) Cardiac arrest  Acute hypoxemic respiratory failure  Lactic acidemia  Closed fracture of multiple ribs of right side    Sternal fracture, multiple rib fractures as a result of CP earlier today.  Not breathing over the vent.  To be initiated on hypothermia protocol.   H&P   X Acidosis documented Lactic acidemia    Renal/  RYLIE (acute kidney injury) w/ Met Acidosis    Cont IVFs and support BP  Follow up BMPs    Metabolic acidosis    Cont IVFs and support BP  Follow up BMPs  Good urine output  Start bicarb infusion H&P    Wes Padilla NP at 10/7/2018 11:51 AM      Wes Padilla NP at 10/8/2018 10:35 AM   X ABGs / Labs ABG (intubated)  10/7   PH - 7.285  PCO2 - 36.7  PO2 - 155  HCO3 - 17.5    NA - 133-135 from 10/8 - 10/9  CO2 - 130 - 20 from 10/8 - 10/9  Calcium 6.8 - 7.0 from 10/8 - 10/9  Phosphorus 2.6 * 2.9 10/8    WBC   10/7 - 17.15  10/8 - 16.15  10/9 - 13.12  10/10 - 10.12  10/11 - 7.69  10/12 - 4.2 ABG results - first documented 10/7              Labs   X Vital Signs BP                                    HR  10/7 0202 - 98/53                      0211 - 254/125           0325 - 84/43          0346 - 76/40   Initial ER vitals             X Hypotension or Low Blood Pressure documented Ordered to turn off propofol due to hypotension  BP decreased to 76/47  · Ordered to start levophed to  maintain SBP >65 and titrate down propofol as tolerated     Hold BP meds due to hypotension      Shaila Verma MD at 10/7/2018  9:05 PM      Jose Eduardo Meek MD at 10/9/2018  4:09 PM    X Altered Mental Status or Confusion Neuro   Anoxic brain injury    --  TTM complete  Neuro following  Seizures off Propofol  ICU neuro monitoring  Supportive care  Propofol for underlying seizures  EEG revealing seizure activity  CT head unremarkable in ED for acute proces      Anoxic seizures    Cont Propofol infusion and start Keppra  Neuro following     Wes Padilla NP at 10/9/2018 1:00 PM Critical care medicine    Diaphoresis, Cold Extremities or Cyanosis      Oliguria     X Medication/Treatment:  -Vasopressors  -Inotropic Drugs  -IV Fluids  -Cardiac Assist Devices  -Hemodynamic Monitoring  -Blood/Blood Products -Norepinephrine drip   10/7 - 10/9    -Normal saline with 1 meq sodium bicarbinate IV continuous at 125/hr. 10/8, 10/10    -Dextrose 12.5 g IV 10/7 1019,  10/10 - 115     -Normal saline infusion at 130/hr 10/7 1030 10/8 0911  Normal saline 1000 ml 10/7 0334  Normal saline bolus 30 ml/kg - 2,154 ml - 10/7 0425  Normal saline bolus 500 ml IV 10/10 0005   Medication sheets   X Other: Pt. Was working,  when coworkers noticed patient was having difficulty breathing and then collapsed. EMS reports when they arrived patient was bradycardic in 40s and then shortly after pulse was lost. EMS and FD initiated CPR and administered epi and regained pulse. EMS reports shortly after, pulse was lost and another dose of epi was administered and regained pulse. Patient was intubated on scene with 7.0 ET tube.      ER MD Note     Provider, please specify diagnosis or diagnoses associated with above clinical findings.    [  ] Cardiogenic Shock  [  ] Hypovolemic Shock  [  ] Septic Shock  [  ] Other Shock (please specify): _________________________________  [ x ] Shock Unspecified  [  ] Other Condition (please specify):  ___________________________________  [  ] Clinically Undetermined    Please document in your progress notes daily for the duration of treatment until resolved and include in your discharge summary.

## 2018-10-12 NOTE — ASSESSMENT & PLAN NOTE
Continued slow decline h/h; stool color concerning, send for occult blood  Add therapeutic PPI; may need to stop ASA  Conservative transfusion protocol  Follow CBC

## 2018-10-12 NOTE — ASSESSMENT & PLAN NOTE
TTM with rewarming completed early AM 10/9  Neuro following  CT head unremarkable in ED for acute process  ICU neuro monitoring  Likely Poor prognosis suspected for meaningful neuro recovery, need seizure control before definitive prognostication

## 2018-10-12 NOTE — SUBJECTIVE & OBJECTIVE
Interval History: Pt cont requiring ventilation  Support .  She is having active  Seizures despite 2  antiseisures medication . Case was discussed with neurology which recommend to transferred to a higher level of care for a 24 hrs EEG monitor     Review of Systems   Unable to perform ROS: Intubated     Objective:     Vital Signs (Most Recent):  Temp: 97.5 °F (36.4 °C) (10/12/18 1113)  Pulse: 71 (10/12/18 1800)  Resp: 18 (10/12/18 1800)  BP: (!) 183/58 (10/12/18 1800)  SpO2: 100 % (10/12/18 1800) Vital Signs (24h Range):  Temp:  [97.5 °F (36.4 °C)-98 °F (36.7 °C)] 97.5 °F (36.4 °C)  Pulse:  [55-77] 71  Resp:  [10-22] 18  SpO2:  [100 %] 100 %  BP: (112-183)/() 183/58     Weight: 76.8 kg (169 lb 5 oz)  Body mass index is 33.24 kg/m².    Intake/Output Summary (Last 24 hours) at 10/12/2018 1808  Last data filed at 10/12/2018 1400  Gross per 24 hour   Intake 2036.43 ml   Output 1102 ml   Net 934.43 ml      Physical Exam   Constitutional: She appears well-nourished. She has a sickly appearance. She appears ill. No distress. She is intubated.   HENT:   Head: Normocephalic.   Eyes: Conjunctivae are normal. Pupils are equal, round, and reactive to light.   Left upward gaze bilaterally  Eyelids open with rhythmic upper lid flickering   Neck: No JVD present. Carotid bruit is not present. No tracheal deviation present.       Cardiovascular: Normal rate and regular rhythm.   Pulses:       Radial pulses are 1+ on the right side, and 1+ on the left side.        Dorsalis pedis pulses are 1+ on the right side, and 1+ on the left side.   Pulmonary/Chest: She is intubated. No respiratory distress. She has no wheezes. She has rales.           Abdominal: Soft. She exhibits no distension. Bowel sounds are decreased.   Musculoskeletal: She exhibits no edema or deformity.   Diffuse mild edema   Lymphadenopathy:     She has no cervical adenopathy.   Neurological: She is unresponsive. She displays seizure activity. GCS eye subscore is  2. GCS verbal subscore is 1. GCS motor subscore is 2.   Faint extensor posturing with bilat upper ext to painful stimuli  - cough, gag, or corneal reflexes   Skin: Skin is warm and dry. Capillary refill takes less than 2 seconds. She is not diaphoretic. No cyanosis.       Significant Labs:   BMP:   Recent Labs   Lab  10/12/18   0400  10/12/18   1554   GLU  92  105   NA  138  138   K  4.3  4.2   CL  111*  111*   CO2  21*  20*   BUN  16  17   CREATININE  0.8  0.8   CALCIUM  8.0*  7.9*   MG  1.7   --      CBC:   Recent Labs   Lab  10/11/18   1548  10/12/18   0400  10/12/18   1554   WBC  5.59  4.20  4.28   HGB  7.3*  7.0*  7.2*   HCT  22.6*  21.6*  21.5*   PLT  89*  83*  92*       Significant Imaging: I have reviewed all pertinent imaging results/findings within the past 24 hours.

## 2018-10-12 NOTE — PROGRESS NOTES
She is not showing any improvement. This morning, with propofol off , she did show some seizing. Propofol was put back on. Depakote level was low.  Neurological assessment is very difficult while ongoing seizure present.  She needs transfer for EEG monitoring.  Discussed the case with Hospitalist , Dr. Kitchen .    Discussed the case with Dr. Alvino Gutierrez in [place].    Time: around 12:30 pm    There was family talk regarding transfer of the patient to . Earlier she was worked out for transfer to [place] for higher level of care . Family, father and son were upset because it is difficult for them to  Go to  back and forth.They demanded transfer to [facility] or [facility] .   ICU team talked to them to try to find out.

## 2018-10-12 NOTE — PROGRESS NOTES
[facility]  Critical Care Medicine  Progress Note    Patient Name: Charline Messer  MRN: [id number]  Admission Date: 10/7/2018  Hospital Length of Stay: 5 days  Code Status: Full Code  Attending Provider: Jose Eduardo Meek, *  Primary Care Provider: Primary Doctor No   Principal Problem: Anoxic brain injury    Subjective:     HPI:  Ms Messer is a 82 yo BF with a PMH of CAD, CHF, HTN, HLD, Hypothyroidism, PAD, OA and Right Breast CA.  Per EMS, patient was working at [employer] [employer] last night when coworkers noticed patient was having difficulty breathing and then collapsed. EMS reports when they arrived patient was bradycardic in 40s and then shortly after pulse was lost. EMS and FD initiated CPR and administered epi and regained pulse. EMS reports shortly after, pulse was lost and another dose of epi was administered and regained pulse. Patient was intubated on scene with 7.0 ET tube.  She was presented to [facility] ED about 0230 hr this AM w/ GCS 3, LA > 12, SBP in 80s and started on Levophed infusion once CL obtained.  CT head unremarkable and CT chest with multiple right rib fractures.  She had right tension PTX and chest tube placed.          Hospital/ICU Course:  10/7 - Admitted to ICU this AM unresponsive on no sedation with some decorticate posturing.  SBP 180s off Levophed infusion.  Intubated on [facility] ventilation.  External cooling TTM started in ED.    10/8 -Remains unresponsive with decorticate posturing on no sedation. Some neurological improvement. Withdraws legs from painful stimuli. Pupils are reactive. Remains intubated on [facility] vent. BP is labile requiring alternating levophed and cardene.TTM continues. Good urine output.   10/9 - Re-warmed overnight completing TTM.  Silent Seizures this AM with attempted weaning of Propofol and confirmed with bedside EEG.  Still on vent with BP more stable not requiring Levophed or Cardene infusions  10/10 - This AM still intubated on [facility]  vent support and unresponsive.  Eye twitching with holding of Propofol infusion this AM.  Emesis yesterday and last night.  SBP to 200 off Propofol infusion  10/11 - remains intubated on mechanical ventilation with low dose propofol sedation; SAT this am with subsequent gaze change and mild nystagmus concerning for continued seizure; required D50 for episode of hypoglycemia overnight  10/12 - remains on mechanical ventilation via OETT; depakote added yesterday and tolerated off propofol overnight without reported seizure activity however this am with stimulation for neuro exam I witnessed bilat foot tremor, 40pt HR elevation, left upward gaze deviation, and eyelid flickering concerning for seizure, propofol restarted; continued dark diarrhea with fecal management system in place    Review of Systems   Unable to perform ROS: Patient unresponsive       Objective:     Vital Signs (Most Recent):  Temp: 98 °F (36.7 °C) (10/12/18 0800)  Pulse: (!) 59 (10/12/18 1000)  Resp: 18 (10/12/18 1000)  BP: (!) 141/40 (10/12/18 1000)  SpO2: 100 % (10/12/18 1000) Vital Signs (24h Range):  Temp:  [93.5 °F (34.2 °C)-98.5 °F (36.9 °C)] 98 °F (36.7 °C)  Pulse:  [48-77] 59  Resp:  [0-22] 0  SpO2:  [100 %] 100 %  BP: ()/() 129/33     Weight: 76.8 kg (169 lb 5 oz)  Body mass index is 33.24 kg/m².      Intake/Output Summary (Last 24 hours) at 10/12/2018 1021  Last data filed at 10/12/2018 0900  Gross per 24 hour   Intake 2638.53 ml   Output 2072 ml   Net 566.53 ml       Physical Exam   Constitutional: She appears well-nourished. She has a sickly appearance. She appears ill. No distress. She is intubated.   HENT:   Head: Normocephalic.   Eyes: Conjunctivae are normal. Pupils are equal, round, and reactive to light.   Left upward gaze bilaterally  Eyelids open with rhythmic upper lid flickering   Neck: No JVD present. Carotid bruit is not present. No tracheal deviation present.       Cardiovascular: Normal rate and regular rhythm.    Pulses:       Radial pulses are 1+ on the right side, and 1+ on the left side.        Dorsalis pedis pulses are 1+ on the right side, and 1+ on the left side.   Pulmonary/Chest: She is intubated. No respiratory distress. She has no wheezes. She has rales.           Abdominal: Soft. She exhibits no distension. Bowel sounds are decreased.   Musculoskeletal: She exhibits no edema or deformity.   Diffuse mild edema   Lymphadenopathy:     She has no cervical adenopathy.   Neurological: She is unresponsive. She displays seizure activity. GCS eye subscore is 2. GCS verbal subscore is 1. GCS motor subscore is 2.   Faint extensor posturing with bilat upper ext to painful stimuli  - cough, gag, or corneal reflexes   Skin: Skin is warm and dry. Capillary refill takes less than 2 seconds. She is not diaphoretic. No cyanosis.       Vents:  Vent Mode: A/C (10/12/18 0852)  Set Rate: 18 bmp (10/12/18 0852)  Vt Set: 0 mL (10/12/18 0852)  Pressure Support: 0 cmH20 (10/12/18 0852)  PEEP/CPAP: 5 cmH20 (10/12/18 0852)  Oxygen Concentration (%): 35 (10/12/18 0900)  Peak Airway Pressure: 29 cmH2O (10/12/18 0852)  Plateau Pressure: 0 cmH20 (10/12/18 0852)  Total Ve: 6.27 mL (10/12/18 0852)  F/VT Ratio<105 (RSBI): (!) 0 (10/12/18 0852)    Lines/Drains/Airways     Central Venous Catheter Line                 Percutaneous Central Line Insertion/Assessment - triple lumen  10/07/18 1105 left internal jugular 4 days          Drain                 NG/OG Tube 10/07/18 0230 orogastric 16 Fr. Right mouth 5 days         Chest Tube 10/07/18 1238 1 Right Fourth intercostal space 24 Fr. 4 days         Urethral Catheter 10/07/18 1200 Temperature probe 16 Fr. 4 days         Rectal Tube 10/10/18 0302 fecal management system 2 days          Airway                 Airway - Non-Surgical 10/07/18 0145 Endotracheal Tube 5 days                Significant Labs:    CBC/Anemia Profile:  Recent Labs   Lab  10/11/18   0402  10/11/18   1548  10/12/18   0400   WBC   7.69  5.59  4.20   HGB  7.4*  7.3*  7.0*   HCT  22.9*  22.6*  21.6*   PLT  86*  89*  83*   MCV  87  87  87   RDW  15.0*  14.7*  14.4        Chemistries:  Recent Labs   Lab  10/11/18   0402  10/11/18   1548  10/12/18   0400   NA  138  139  138   K  3.7  3.7  4.3   CL  110  111*  111*   CO2  21*  22*  21*   BUN  13  14  16   CREATININE  0.8  0.8  0.8   CALCIUM  7.4*  8.1*  8.0*   ALBUMIN  1.9*  1.9*  1.7*   PROT  4.6*  4.6*  4.3*   BILITOT  0.5  0.6  0.6   ALKPHOS  74  77  72   ALT  37  34  31   AST  90*  79*  70*   MG  1.7   --   1.7   PHOS  2.4*   --   2.5*       All pertinent labs within the past 24 hours have been reviewed.  ABG  Recent Labs   Lab  10/12/18   0419   PH  7.517*   PO2  176*   PCO2  27.4*   HCO3  22.2*   BE  -1       Significant Imaging:  I have reviewed all pertinent imaging results/findings within the past 24 hours.      Assessment/Plan:     Neuro   * Anoxic brain injury    TTM with rewarming completed early AM 10/9  Neuro following  CT head unremarkable in ED for acute process  ICU neuro monitoring  Likely Poor prognosis suspected for meaningful neuro recovery, need seizure control before definitive prognostication        Anoxic seizures    On keppra, depakote added yesterday  Propofol resumed this am  Need optimization of depakote level  Neuro following, may need transfer for continuous EEG capability and optimization of seizure control        Pulmonary   Acute hypoxemic respiratory failure    Cont full vent support; settings adjusted per abg to optimize gas exchange  VAP prophylaxis  Propofol resumed for seizure control, not candidate for SAT/SBT        Cardiac/Vascular   Cardiac arrest    ICU cardiac monitoring  Echo preserved systolic function with EF 55%  Cont supportive care        Coronary artery disease involving native coronary artery of native heart without angina pectoris    Continue home Plavix, statin, and ASA  ICU cardiac monitoring  Echo EF 55%        PAD (peripheral artery disease)     Continue home Plavix, statin and ASA        Essential hypertension    Metoprolol stopped s/t bradycardia  Prn hydralazine SBP> 160  ICU hemodynamic monitoring        Oncology   Anemia and Thrombocytopenia    Continued slow decline h/h; stool color concerning, send for occult blood  Add therapeutic PPI; may need to stop ASA  Conservative transfusion protocol  Follow CBC        Endocrine   Hyperglycemia, unspecified    Range  last 24 hours  Continue monitoring        Other   Traumatic fracture of ribs with pneumothorax, right, closed, initial encounter    Chest tube secured and cont suction  Daily CXR        Preventive Measures:   Nutrition: advance TF as tolerated  Stress Ulcer: PPI  DVT: SCDs  BB: stopped s/t bradycardia  Bowel Prophylaxis: active diarrhea  Head of Bed/Reposition: Elevate HOB and turn Q1-2 hours    Mobility: ROM  SAT/SBT: contraindicated  Vent Day: 6  OG Day: 6  Central Line Day: 6  Swain Day: 6  Code Status: Full    Counseling/Consultation: I have discussed the care of this patient in detail with nursing staff and Dr. Britt. Status and plan of care discussed with team on multidisciplinary rounds.     Critical Care Time: 65 minutes  Critical secondary to anoxic injury with seizures, encephalopathy on mechanical ventilation   Critical care was time spent personally by me on the following activities: development of treatment plan with patient or surrogate and bedside caregivers, discussions with consultants, evaluation of patient's response to treatment, examination of patient, ordering and performing treatments and interventions, ordering and review of laboratory studies, ordering and review of radiographic studies, pulse oximetry, re-evaluation of patient's condition. This critical care time did not overlap with that of any other provider or involve time for any procedures.     KYLER Beyer-BC  Critical Care Medicine  [facility] [place]

## 2018-10-12 NOTE — ASSESSMENT & PLAN NOTE
On keppra, depakote added yesterday  Propofol resumed this am  Need optimization of depakote level  Neuro following, may need transfer for continuous EEG capability and optimization of seizure control

## 2018-10-12 NOTE — PHYSICIAN QUERY
"PT Name: Charline Messer  MR #: [id number]    Physician Query Form - Hematology Clarification      CDS/: Liz Pineda RN CDI     Contact information: [email or web address]    This form is a permanent document in the medical record.      Query Date: October 12, 2018    By submitting this query, we are merely seeking further clarification of documentation. Please utilize your independent clinical judgment when addressing the question(s) below.    The Medical record contains the following:   Indicators  Supporting Clinical Findings Location in Medical Record   X "Anemia" documented Anemia and Thrombocytopenia    No active bleeding  Conservative transfusion protocol  Follow CBC  Will stop LMWH if Plts cont to drop   Wes Padilla NP at 10/9/2018  1:00 PM   X H & H = H/H  10/7 - 12.8/43.0  10/8 - 10.0/30.8  10/80 1608 - 8.6/26.9  10/9 - 8.2/25.7  10/10 - 7.9/25.3  10/11 - 7.3/22.6  10/12 - 7.0/21.6 Lab results   X BP =                     HR= BP on er admit                HR   10/7 0202 - 98/53                      0211 - 254/125           0325 - 84/43          0346 - 76/40          0516 - 101/57         0616 - 119/63         0815 - 134/64      ER Vitals    "GI bleeding" documented    none     Acute bleeding (Non GI site)      Transfusion(s)      Treatment:      Other:  Hx. Of breast cancer med 1980s H&P     Provider, please specify diagnosis or diagnoses associated with above clinical findings.      [ x ] Acute blood loss anemia    [  ] Iron deficiency anemia    [  ] Chronic blood loss anemia    [  ] Precipitous drop in Hematocrit    [  ] Anemia of chronic disease ( Specify chronic disease)         [  ] Other (Specify) _______________________________     [  ] Clinically Undetermined     [  ] Other Hematological Diagnosis (please specify): _________________________________    [  ] Clinically Undetermined       Please document in your progress notes daily for the duration of treatment, until resolved, and " include in your discharge summary.

## 2018-10-12 NOTE — ASSESSMENT & PLAN NOTE
Cont full vent support; settings adjusted per abg to optimize gas exchange  VAP prophylaxis  Propofol resumed for seizure control, not candidate for SAT/SBT

## 2018-10-12 NOTE — PHYSICIAN QUERY
PT Name: Charline Messer  MR #: [id number]    Physician Query Form - Heart  Condition Clarification     CDS/: Liz Pineda RN CDI      Contact information: [email or web address]  This form is a permanent document in the medical record.     Query Date: October 12, 2018    By submitting this query, we are merely seeking further clarification of documentation. Please utilize your independent clinical judgment when addressing the question(s) below.    The medical record contains the following   Indicators     Supporting Clinical Findings Location in Medical Record   X BNP 14 - 10/7 LABS   X EF 55-60 ECHO 10/7   X Radiology findings Chest x-ray 07/03/2017.  There is a large right-sided tension pneumothorax with prominent flattening/depression of the previously chronically elevated right hemidiaphragm and mediastinal shifting towards the left along with partial right lung collapse.  Left lung is clear.  Normal size heart.  No congestion.  Endotracheal tube is low lying, just above the easton.  Recommend 4 cm of retraction    There has been re-expansion of the right lung with evacuation of the previously seen large tension pneumothorax with placement of a right basilar chest tube.  Tiny residual apical pneumothorax is noted.  Low lying endotracheal tube, recommend 4 cm retraction.    Single view of the chest.  Moderate right-sided pneumothorax measuring 1.8 cm in maximum diameter.  Right chest tube remains in position.  Tubes and lines are stable.  Left lung is clear.  No mediastinal shift.  Mild atelectasis or airspace disease right lung base.   Chest xray 10/7 0207                Chest xray 10/7 230          10/8 540 am   X Echo Results CONCLUSIONS     1 - Normal left ventricular systolic function (EF 55-60%).     2 - Normal right ventricular systolic function .     3 - No wall motion abnormalities.     4 - Small left ventricular enlargement.     5 - Concentric hypertrophy.     6 - Pulmonary hypertension. The estimated  "PA systolic pressure is 46 mmHg.     7 - Mild tricuspid regurgitation.  ECHO 10/7    "Ascites" documented     X "SOB" or "ABURTO" documented Cardiac arrest - Patient was intubated on scene with 7.0 ET tube.      H&P, ER MD notes   X "Hypoxia" documented Cardiac arrest - Patient was intubated on scene with 7.0 ET tube.      H&P, ER MD notes   X Heart Failure documented HX. Of CHF H&P   X "Edema" documented No edema H%P   X Diuretics/Meds Furosemide 20 mg IV 10/11 1123 one dose Medication sheets      Treatment:      Other:      Heart failure (HF) can be acute, chronic or both. It is generally further specificed as systolic, diastolic, or combined. Lastly, it is important to identify an underlying etiology if known or suspected.     Common clues to acute exacerbation:  Rapidly progressive symptoms (w/in 2 weeks of presentation), using IV diuretics to treat, using supplemental O2, pulmonary edema on Xray, MI w/in 4 weeks, and/or BNP >500    Systolic Heart Failure: is defined as chart documentation of a left ventricular ejection fraction (LVEF) less than 40%     Diastolic Heart Failure: is defined as a left ventricular ejection fraction (LVEF) greater than 40%   +      Evidence of diastolic dysfunction on echocardiography OR    Right heart catheterization wedge pressure above 12 mm Hg OR    Left heart catheterization left ventricular end diastolic pressure 18 mm Hg or above.    References: *American Heart Association    The clinical guidelines noted below are only system guidelines, and do not replace the providers clinical judgment.     Provider, please specify the diagnosis associated with above clinical findings                            [   ] Chronic Diastolic Heart Failure - Pre-existing diastolic HF diagnosis.  EF > 40%  without  acute HF symptoms document                [   ] Other Type of Heart Failure (please specify type): _________________________    [ x  ] Heart Failure Ruled Out    [   ] Other (please " specify): ___________________________________    [   ] Clinically Undetermined                            Please document in your progress notes daily for the duration of treatment until resolved and include in your discharge summary.

## 2018-10-12 NOTE — SUBJECTIVE & OBJECTIVE
Review of Systems   Unable to perform ROS: Patient unresponsive       Objective:     Vital Signs (Most Recent):  Temp: 98 °F (36.7 °C) (10/12/18 0800)  Pulse: (!) 59 (10/12/18 1000)  Resp: 18 (10/12/18 1000)  BP: (!) 141/40 (10/12/18 1000)  SpO2: 100 % (10/12/18 1000) Vital Signs (24h Range):  Temp:  [93.5 °F (34.2 °C)-98.5 °F (36.9 °C)] 98 °F (36.7 °C)  Pulse:  [48-77] 59  Resp:  [0-22] 0  SpO2:  [100 %] 100 %  BP: ()/() 129/33     Weight: 76.8 kg (169 lb 5 oz)  Body mass index is 33.24 kg/m².      Intake/Output Summary (Last 24 hours) at 10/12/2018 1021  Last data filed at 10/12/2018 0900  Gross per 24 hour   Intake 2638.53 ml   Output 2072 ml   Net 566.53 ml       Physical Exam   Constitutional: She appears well-nourished. She has a sickly appearance. She appears ill. No distress. She is intubated.   HENT:   Head: Normocephalic.   Eyes: Conjunctivae are normal. Pupils are equal, round, and reactive to light.   Left upward gaze bilaterally  Eyelids open with rhythmic upper lid flickering   Neck: No JVD present. Carotid bruit is not present. No tracheal deviation present.       Cardiovascular: Normal rate and regular rhythm.   Pulses:       Radial pulses are 1+ on the right side, and 1+ on the left side.        Dorsalis pedis pulses are 1+ on the right side, and 1+ on the left side.   Pulmonary/Chest: She is intubated. No respiratory distress. She has no wheezes. She has rales.           Abdominal: Soft. She exhibits no distension. Bowel sounds are decreased.   Musculoskeletal: She exhibits no edema or deformity.   Diffuse mild edema   Lymphadenopathy:     She has no cervical adenopathy.   Neurological: She is unresponsive. She displays seizure activity. GCS eye subscore is 2. GCS verbal subscore is 1. GCS motor subscore is 2.   Faint extensor posturing with bilat upper ext to painful stimuli  - cough, gag, or corneal reflexes   Skin: Skin is warm and dry. Capillary refill takes less than 2 seconds. She  is not diaphoretic. No cyanosis.       Vents:  Vent Mode: A/C (10/12/18 0852)  Set Rate: 18 bmp (10/12/18 0852)  Vt Set: 0 mL (10/12/18 0852)  Pressure Support: 0 cmH20 (10/12/18 0852)  PEEP/CPAP: 5 cmH20 (10/12/18 0852)  Oxygen Concentration (%): 35 (10/12/18 0900)  Peak Airway Pressure: 29 cmH2O (10/12/18 0852)  Plateau Pressure: 0 cmH20 (10/12/18 0852)  Total Ve: 6.27 mL (10/12/18 0852)  F/VT Ratio<105 (RSBI): (!) 0 (10/12/18 0852)    Lines/Drains/Airways     Central Venous Catheter Line                 Percutaneous Central Line Insertion/Assessment - triple lumen  10/07/18 1105 left internal jugular 4 days          Drain                 NG/OG Tube 10/07/18 0230 orogastric 16 Fr. Right mouth 5 days         Chest Tube 10/07/18 1238 1 Right Fourth intercostal space 24 Fr. 4 days         Urethral Catheter 10/07/18 1200 Temperature probe 16 Fr. 4 days         Rectal Tube 10/10/18 0302 fecal management system 2 days          Airway                 Airway - Non-Surgical 10/07/18 0145 Endotracheal Tube 5 days                Significant Labs:    CBC/Anemia Profile:  Recent Labs   Lab  10/11/18   0402  10/11/18   1548  10/12/18   0400   WBC  7.69  5.59  4.20   HGB  7.4*  7.3*  7.0*   HCT  22.9*  22.6*  21.6*   PLT  86*  89*  83*   MCV  87  87  87   RDW  15.0*  14.7*  14.4        Chemistries:  Recent Labs   Lab  10/11/18   0402  10/11/18   1548  10/12/18   0400   NA  138  139  138   K  3.7  3.7  4.3   CL  110  111*  111*   CO2  21*  22*  21*   BUN  13  14  16   CREATININE  0.8  0.8  0.8   CALCIUM  7.4*  8.1*  8.0*   ALBUMIN  1.9*  1.9*  1.7*   PROT  4.6*  4.6*  4.3*   BILITOT  0.5  0.6  0.6   ALKPHOS  74  77  72   ALT  37  34  31   AST  90*  79*  70*   MG  1.7   --   1.7   PHOS  2.4*   --   2.5*       All pertinent labs within the past 24 hours have been reviewed.  [facility]  Recent Labs   Lab  10/12/18   0419   PH  7.517*   PO2  176*   PCO2  27.4*   HCO3  22.2*   BE  -1       Significant Imaging:  I have reviewed all pertinent  imaging results/findings within the past 24 hours.

## 2018-10-12 NOTE — PLAN OF CARE
Problem: Patient Care Overview  Goal: Plan of Care Review  Outcome: Ongoing (interventions implemented as appropriate)  No seizure activity on shift.  Keppra and Depakote administered as ordered. Hyoscyamine administered for excessive oral secretions.  Patient opens eyes to stimulation. Not following commands.  Positive Gag reflex.  Negative corneal reflex.  Remains on Vent.  IV fluids continued.  Remains NPO.  CBG Q4H. Tube feeding provided at 10/hr.  Not able to increase due to residuals being 140, 105, and 130. Flexi seal in place draining dark brown liquid stool.  UOP marginal.  Repositioning patient Q2H to prevent skin breakdown.  POC reviewed with family.

## 2018-10-13 NOTE — NURSING TRANSFER
Nursing Transfer Note      10/12/2018     Transfer To: [facility]: [id number]    Transfer via bed    Transfer with Vent, cardiac monitoring, Propofol gtt    Transported by EMS    Medicines sent: Propofol gtt    Notified: spouse

## 2018-10-13 NOTE — PLAN OF CARE
Problem: Patient Care Overview  Goal: Plan of Care Review  Outcome: Ongoing (interventions implemented as appropriate)  Vitals signs closely monitored. Fall precautions in place. Patient turned every two hours. Pain assessed every two hours. Safety promoted. Infection risks reduced. Neuro status monitored. At 720 seizure activity was noticed. YESIKA Medrano and MD Gutierrez aware. Propofol added. Will transfer to [facility] for cont eeg. Report called.  Family has been irate today. Security had to be called. Their concerns were addressed

## 2018-10-13 NOTE — PROGRESS NOTES
Patient transported by EMS with vent and propofol gtt continued, chest tube remains patient and secure. No signs of distress noted. Patient tolerated transfer to EMS bed well.  informed. Transferring to [facility], room 2628.

## 2018-10-20 NOTE — DISCHARGE SUMMARY
[facility] Hospital Medicine  Discharge Summary      Patient Name: Charline Messer  MRN: [id number]  Admission Date: 10/7/2018  Hospital Length of Stay: 5 days  Discharge Date and Time: 10/12/2018  8:15 PM  Attending Physician: No att. providers found   Discharging Provider: Jose Eduardo Meek MD  Primary Care Provider: Primary Doctor Payal      HPI:   Ms. Messer is a 83-year-old  female with PMH significant for HTN, CAD, was working at [email or web address], when she had a witnessed cardiac arrest at around midnight.  Patient was complaining of shortness of breath and collapsed.  Medical staff immediately started CPR, received two rounds of epinephrine, one round of atropine.  She was intubated on the scene and brought to the ED.  GCS upon arrival 3.  No corneal reflex, no gag reflex, not breathing over the vent.  Laboratory workup reveals lactic acid greater than 12, hypotensive with systolic in the 80s, initiated on Levophed drip.  Currently not on sedation except for IV Ativan as needed.  Chest x-ray reveals sternal fracture and multiple anterior rib fractures from the CPR earlier today.  CT head negative for ischemia or hemorrhage.  Patient had a pneumothorax, chest tube placed in the ED. Patient's  and son at the bedside hysterical.  Do not want to discuss any further resuscitative measures.  Patient remains full code at this time.  Patient to be initiated on hypothermia protocol.    * No surgery found *      Hospital Course:   84 y/o aaf admitted to icu  On mechanical ventilation   After cardiac arrest .  She was started on hypothermic  Protocol. Pt is s/p right side chest tube  2/2/ to  Traumatic pneumothorax . Patient with no cough, gag or corneal reflexes  .  There is no motor , sensory  Or painful response  . The TTE  Show  Normal EF , no WMA  And mild PHT . Pt was started on Cardene drip  Due to elevated bp    sbp > 200 . Pt  Became hypotensive and levophed was  started to keep a map > 65 .  Pt was re- warm  .Now she has new seizures onset . The EEG  Show seizures  Activity  And started on seizures medication .  Pt   Now on 2 seizures medication kappra and Depakote .  The propofol was d/c and  Is seizure free .   Pt cont  with active seizures . The case was discussed with neurology which recommend  To transferre  To a higher level of care for a continues EEG monitor  Pt was accepted to [facility]  For a continues EEG  Monitor . The family refuse to go to  and  Want to stay  In  . The [facility] does not have  24 hrs EEG monitor .  Transfer center  Contacted [facility] which agree  To take the pt  ICU to ICU for a 24 hrs EEG monitor            Consults:   Consults (From admission, onward)        Status Ordering Provider     Inpatient consult to Neurology  Once     Provider:  Ada Gutierrez MD    Completed CHARLY HORAN     Inpatient consult to Pulmonology  Once     Provider:  Kaden Padilla NP    Completed LAINE CABRERA     Inpatient consult to Registered Dietitian/Nutritionist  Once     Provider:  (Not yet assigned)    KADEN Slaughter     IP consult to case management  Once     Provider:  (Not yet assigned)    Completed ANTOINE CASEY          Anemia and Thrombocytopenia    Unknown etiology   H/H trending down  .  Transfuse as needed  If < than 7        Anoxic seizures    Cont supportive  tx   Neurology on board  Cont kepra and Depakote   Pt will be transferred  To a higher level of care for a 24 hrs  EEG monitor per neurology recommendation        Traumatic fracture of ribs with pneumothorax, right, closed, initial encounter    Chest tube per CT surgery        Lactic acidemia    Lactic acid greater than 12.  Secondary to cardiac arrest.  Repeat every 4 hr x2.  Continue IV vancomycin, IV Zosyn empirically.  Follow up on cultures.       Acute hypoxemic respiratory failure    2/2 to cardiopulmonary arrest   Cont ventilation support  . Wean  As  tolerated      Cardiac arrest    -Unclear etiology.  Possible cardio respiratory   -CPR done for approximately 30 min per nursing staff.  -Initial rhythm was bradycardic, received atropine and epinephrine with return of spontaneous circulation after two rounds of epi.  -s/p hypothermia protocol   - Neurology on board   -Poor prognosis              Coronary artery disease involving native coronary artery of native heart without angina pectoris    Cont BB , asa , plavix  and statin   TTE show nl EF  And no WMA        PAD (peripheral artery disease)    Cont  Current  tx      Essential hypertension     Cont current tx          Final Active Diagnoses:    Diagnosis Date Noted POA    PRINCIPAL PROBLEM:  Anoxic brain injury [G93.1] 10/07/2018 Yes    Anoxic seizures [R56.9] 10/09/2018 Yes    Anemia and Thrombocytopenia [D64.9] 10/09/2018 Yes    Cardiac arrest [I46.9] 10/07/2018 Yes    Acute hypoxemic respiratory failure [J96.01] 10/07/2018 Yes    Lactic acidemia [E87.2] 10/07/2018 Yes    Traumatic fracture of ribs with pneumothorax, right, closed, initial encounter [S22.41XA, S27.0XXA] 10/07/2018 Yes    Hyperglycemia, unspecified [R73.9] 10/07/2018 Yes    Coronary artery disease involving native coronary artery of native heart without angina pectoris [I25.10] 08/02/2017 Yes     Chronic    PAD (peripheral artery disease) [I73.9] 11/24/2015 Yes     Chronic    Essential hypertension [I10] 07/09/2013 Yes     Chronic      Problems Resolved During this Admission:    Diagnosis Date Noted Date Resolved POA    Metabolic acidosis [E87.2] 10/07/2018 10/10/2018 Yes       Discharged Condition: critical    Disposition: Another Health Care Inst*    Follow Up:    Patient Instructions:      Activity as tolerated       Significant Diagnostic Studies: Labs: BMP: No results for input(s): GLU, NA, K, CL, CO2, BUN, CREATININE, CALCIUM, MG in the last 48 hours. and CBC No results for input(s): WBC, HGB, HCT, PLT in the last 48  hours.    Pending Diagnostic Studies:     Procedure Component Value Units Date/Time    Glucose, random (Unless on insulin infusion) [[id number]] Collected:  10/07/18 0938    Order Status:  Sent Lab Status:  In process Updated:  10/07/18 1000    Specimen:  Blood     Neuron Specific Enolase, Serum [[id number]] Collected:  10/07/18 0622    Order Status:  Sent Lab Status:  In process Updated:  10/07/18 0623    Specimen:  Blood          Medications:  Reconciled Home Medications:      Medication List      START taking these medications    dextrose 5 % SolP 50 mL with valproate 500 mg/5 mL (100 mg/mL) Soln 500 mg  Inject 500 mg into the vein every 12 (twelve) hours.     levETIRAcetam in NaCl (iso-os) 1,500 mg/100 mL Pgbk  Commonly known as:  KEPPRA  Inject 100 mLs (1,500 mg total) into the vein every 12 (twelve) hours.        CONTINUE taking these medications    albuterol 90 mcg/actuation inhaler  Commonly known as:  PROVENTIL/VENTOLIN HFA  Inhale 1-2 puffs into the lungs every 6 (six) hours as needed.     aspirin 81 MG EC tablet  Commonly known as:  ECOTRIN  Take 1 tablet by mouth Daily.     atorvastatin 80 MG tablet  Commonly known as:  LIPITOR  TAKE ONE TABLET BY MOUTH ONCE DAILY     clopidogrel 75 mg tablet  Commonly known as:  PLAVIX  TAKE ONE TABLET BY MOUTH ONCE DAILY     isosorbide mononitrate 30 MG 24 hr tablet  Commonly known as:  IMDUR  TAKE ONE TABLET BY MOUTH ONCE DAILY     metoprolol tartrate 50 MG tablet  Commonly known as:  LOPRESSOR  Take 1 tablet (50 mg total) by mouth 2 (two) times daily.        STOP taking these medications    colchicine 0.6 mg tablet  Commonly known as:  COLCRYS     cyclobenzaprine 10 MG tablet  Commonly known as:  FLEXERIL     fluticasone 50 mcg/actuation nasal spray  Commonly known as:  FLONASE     furosemide 20 MG tablet  Commonly known as:  LASIX            Indwelling Lines/Drains at time of discharge:   Lines/Drains/Airways     Central Venous Catheter Line                  Percutaneous Central Line Insertion/Assessment - triple lumen  10/07/18 1105 left internal jugular 12 days          Drain                 NG/OG Tube 10/07/18 0230 orogastric 16 Fr. Right mouth 13 days         Chest Tube 10/07/18 1238 1 Right Fourth intercostal space 24 Fr. 12 days         Urethral Catheter 10/07/18 1200 Temperature probe 16 Fr. 12 days         Rectal Tube 10/10/18 0302 fecal management system 10 days          Airway                 Airway - Non-Surgical 10/07/18 0145 Endotracheal Tube 13 days                Time spent on the discharge of patient: 35 minutes  Patient was seen and examined on the date of discharge and determined to be suitable for discharge.        Jose Eduardo Meek MD  Department of Hospital Medicine  [facility]

## 2019-05-16 PROBLEM — I27.20 PULMONARY HYPERTENSION: Status: ACTIVE | Noted: 2019-05-16
